# Patient Record
Sex: FEMALE | Race: BLACK OR AFRICAN AMERICAN | URBAN - METROPOLITAN AREA
[De-identification: names, ages, dates, MRNs, and addresses within clinical notes are randomized per-mention and may not be internally consistent; named-entity substitution may affect disease eponyms.]

---

## 2017-01-08 RX ORDER — WARFARIN 6 MG/1
TABLET ORAL
Qty: 90 TAB | Refills: 3 | Status: SHIPPED | OUTPATIENT
Start: 2017-01-08 | End: 2017-12-03 | Stop reason: SDUPTHER

## 2017-01-16 ENCOUNTER — OFFICE VISIT (OUTPATIENT)
Dept: FAMILY MEDICINE CLINIC | Age: 82
End: 2017-01-16

## 2017-01-16 ENCOUNTER — HOSPITAL ENCOUNTER (OUTPATIENT)
Dept: LAB | Age: 82
Discharge: HOME | End: 2017-01-16

## 2017-01-16 VITALS
DIASTOLIC BLOOD PRESSURE: 66 MMHG | SYSTOLIC BLOOD PRESSURE: 164 MMHG | BODY MASS INDEX: 22.67 KG/M2 | RESPIRATION RATE: 16 BRPM | WEIGHT: 108 LBS | TEMPERATURE: 97.5 F | HEART RATE: 64 BPM | OXYGEN SATURATION: 99 % | HEIGHT: 58 IN

## 2017-01-16 DIAGNOSIS — Z95.2 H/O PROSTHETIC HEART VALVE: Primary | ICD-10-CM

## 2017-01-16 DIAGNOSIS — R63.4 WEIGHT LOSS, UNINTENTIONAL: ICD-10-CM

## 2017-01-16 DIAGNOSIS — I50.42 CHRONIC COMBINED SYSTOLIC AND DIASTOLIC CONGESTIVE HEART FAILURE (HCC): ICD-10-CM

## 2017-01-16 LAB
INR BLD: 2.7
PT POC: NORMAL SEC
VALID INTERNAL CONTROL?: YES

## 2017-01-16 PROCEDURE — 99001 SPECIMEN HANDLING PT-LAB: CPT | Performed by: INTERNAL MEDICINE

## 2017-01-16 RX ORDER — OXYCODONE AND ACETAMINOPHEN 5; 325 MG/1; MG/1
1 TABLET ORAL
Qty: 10 TAB | Refills: 0 | Status: SHIPPED | OUTPATIENT
Start: 2017-01-16 | End: 2017-12-20 | Stop reason: ALTCHOICE

## 2017-01-16 RX ORDER — POTASSIUM CHLORIDE 20 MEQ/1
20 TABLET, EXTENDED RELEASE ORAL DAILY
Qty: 90 TAB | Refills: 1
Start: 2017-01-16 | End: 2018-02-26 | Stop reason: ALTCHOICE

## 2017-01-16 NOTE — PROGRESS NOTES
Chief Complaint   Patient presents with    Coagulation disorder     Pain scale 0/10      1. Have you been to the ER, urgent care clinic since your last visit? Hospitalized since your last visit? No    2. Have you seen or consulted any other health care providers outside of the [address] [place] [address] since your last visit? Include any pap smears or colon screening.  no

## 2017-01-16 NOTE — PROGRESS NOTES
HISTORY OF PRESENT ILLNESS  Carina Leigh is a 80 y.o. female. HPI  chf stable  prosthetic valve stable  C/o wgt loss  Review of Systems   All other systems reviewed and are negative. Past Medical History   Diagnosis Date    Heart valve replaced     Hypercholesterolemia     Hypertension      Current Outpatient Prescriptions on File Prior to Visit   Medication Sig Dispense Refill    warfarin (COUMADIN) 6 mg tablet take 1 tablet by mouth once daily 90 Tab 3    NASONEX 50 mcg/actuation nasal spray instill 2 sprays into each nostril once daily 3 Container 3    oxyCODONE-acetaminophen (PERCOCET) 5-325 mg per tablet Take 1 Tab by mouth every four (4) hours as needed. 0    ferrous sulfate, dried (SLOW RELEASE IRON) 159 mg (45 mg iron) TbER tablet Take 1 Tab by mouth daily. 30 Tab 0    lisinopril (PRINIVIL, ZESTRIL) 10 mg tablet Take 1 Tab by mouth daily. 90 Tab 1    esomeprazole (NEXIUM) 40 mg capsule Take 1 Cap by mouth daily. 90 Cap 1    rOPINIRole (REQUIP) 0.5 mg tablet Take 1 Tab by mouth two (2) times a day. 180 Tab 3    fluticasone (FLONASE) 50 mcg/actuation nasal spray 2 Sprays by Both Nostrils route once.  alendronate (FOSAMAX) 70 mg tablet Take 1 weekly with a 2000 unit vitamin D tablet 12 Tab 3    warfarin (COUMADIN) 1 mg tablet Take one every Tuesday, Thursday, Saturday and Sunday. Skip dose every Monday, Wednesday and Friday 100 Tab 3    atorvastatin (LIPITOR) 20 mg tablet Take 1 Tab by mouth daily. 90 Tab 3    guaiFENesin SR (MUCINEX) 600 mg SR tablet Take 1 Tab by mouth two (2) times a day. 20 Tab 0    warfarin (COUMADIN) 6 mg tablet Take 1 Tab by mouth daily. 90 Tab 3    diclofenac (VOLTAREN) 1 % topical gel Apply 4 g to affected area four (4) times daily. 500 g 3    OTHER       OTHER       alendronate-vitamin d3 70-2,800 mg-unit per tablet Take 1 Tab by mouth every seven (7) days.  12 Tab 3    potassium chloride (KLOR-CON M20) 20 mEq tablet Take  by mouth two (2) times a day.      aspirin 81 mg tablet Take 81 mg by mouth.  multivitamins-minerals-lutein (CENTRUM SILVER) Tab Take  by mouth.  calcium carbonate (CALTREX) 600 mg (1,500 mg) tablet Take 600 mg by mouth two (2) times a day.  ESOMEPRAZOLE MAGNESIUM (NEXIUM PO) Take  by mouth.  bumetanide (BUMEX) 2 mg tablet Take 2 mg by mouth daily.  carvedilol (COREG) 6.25 mg tablet Take 1 Tab by mouth two (2) times daily (with meals). 180 Tab 1    carbinoxamine maleate (PALGIC) 4 mg tab Take 1 Tab by mouth three (3) times daily. 40 Tab 1    zolpidem (AMBIEN) 5 mg tablet Take 1 tablet by mouth nightly. 30 tablet 1    cyclobenzaprine (FLEXERIL) 5 mg tablet Take 1 tablet by mouth three (3) times daily as needed for Muscle Spasm(s). 30 tablet 1     No current facility-administered medications on file prior to visit. Visit Vitals    /66 (BP 1 Location: Right arm, BP Patient Position: Sitting)    Pulse 64    Temp 97.5 °F (36.4 °C) (Oral)    Resp 16    Ht 4' 10\" (1.473 m)    Wt 108 lb (49 kg)    SpO2 99%    BMI 22.57 kg/m2         Physical Exam   Constitutional: She appears well-developed and well-nourished. No distress. Musculoskeletal: Normal range of motion. She exhibits no edema, tenderness or deformity. Skin: She is not diaphoretic. Vitals reviewed.     inr 2.7  ASSESSMENT and PLAN  chf   Prosthetic valve  wgt loss  Plan  Continue rx  Labs  Recheck in 1 month

## 2017-01-16 NOTE — MR AVS SNAPSHOT
Visit Information Date & Time Provider Department Dept. Phone Encounter #  
 1/16/2017 10:45 AM Per Crow, 3 [facility] [phone] [id number] Follow-up Instructions Return in about 1 month (around 2/16/2017). Follow-up and Disposition History Upcoming Health Maintenance Date Due  
 GLAUCOMA SCREENING Q2Y 11/12/2016 MEDICARE YEARLY EXAM 8/20/2017 DTaP/Tdap/Td series (2 - Td) 9/19/2026 Allergies as of 1/16/2017  Review Complete On: 1/16/2017 By: Per Crow MD  
 No Known Allergies Current Immunizations  Reviewed on 8/12/2015 Name Date Influenza High Dose Vaccine PF 9/19/2016 12:12 PM  
 Influenza Vaccine 10/24/2014 11:37 AM, 11/12/2013 11:04 AM, 7/1/2008 Influenza Vaccine PF 9/5/2016 Pneumococcal Conjugate (PCV-13) 9/19/2016 12:13 PM  
 Pneumococcal Polysaccharide (PPSV-23) 9/5/2016, 11/26/2014 10:15 AM, 7/1/2008 Tdap 9/19/2016 12:12 PM, 8/12/2015 11:18 AM  
  
 Not reviewed this visit You Were Diagnosed With   
  
 Codes Comments H/O prosthetic heart valve    -  Primary ICD-10-CM: Z95.2 ICD-9-CM: V43.3 Chronic combined systolic and diastolic congestive heart failure (HCC)     ICD-10-CM: I50.42 
ICD-9-CM: 428.42, 428.0 Weight loss, unintentional     ICD-10-CM: R63.4 ICD-9-CM: 783.21 Vitals BP Pulse Temp Resp Height(growth percentile) Weight(growth percentile) 164/66 (BP 1 Location: Right arm, BP Patient Position: Sitting) 64 97.5 °F (36.4 °C) (Oral) 16 4' 10\" (1.473 m) 108 lb (49 kg) SpO2 BMI OB Status Smoking Status 99% 22.57 kg/m2 Postmenopausal Former Smoker BMI and BSA Data Body Mass Index Body Surface Area  
 22.57 kg/m 2 1.42 m 2 Preferred Pharmacy Pharmacy Name Phone [id number] [address] [address] [phone] Your Updated Medication List  
  
   
 This list is accurate as of: 1/16/17 11:25 AM.  Always use your most recent med list.  
  
  
  
  
 alendronate 70 mg tablet Commonly known as:  FOSAMAX Take 1 weekly with a 2000 unit vitamin D tablet  
  
 aspirin 81 mg tablet Take 81 mg by mouth. atorvastatin 20 mg tablet Commonly known as:  LIPITOR Take 1 Tab by mouth daily. bumetanide 2 mg tablet Commonly known as:  Dimas Tony Take 2 mg by mouth daily. calcium carbonate 600 mg (1,500 mg) tablet Commonly known as:  Eleanora Salk Take 600 mg by mouth two (2) times a day. carvedilol 6.25 mg tablet Commonly known as:  Pepito Marshal Take 1 Tab by mouth two (2) times daily (with meals). CENTRUM SILVER Tab tablet Generic drug:  multivitamins-minerals-lutein Take  by mouth.  
  
 esomeprazole 40 mg capsule Commonly known as:  NexIUM Take 1 Cap by mouth daily. ferrous sulfate, dried 159 mg (45 mg iron) Tber tablet Commonly known as:  SLOW RELEASE IRON Take 1 Tab by mouth daily. guaiFENesin  mg SR tablet Commonly known as:  Gioavnni & Giovanni Take 1 Tab by mouth two (2) times a day. lisinopril 10 mg tablet Commonly known as:  Estela Reasons Take 1 Tab by mouth daily. NASONEX 50 mcg/actuation nasal spray Generic drug:  mometasone  
instill 2 sprays into each nostril once daily  
  
 oxyCODONE-acetaminophen 5-325 mg per tablet Commonly known as:  PERCOCET Take 1 Tab by mouth every eight (8) hours as needed. Max Daily Amount: 3 Tabs. potassium chloride 20 mEq tablet Commonly known as:  K-DUR, KLOR-CON Take 1 Tab by mouth daily. rOPINIRole 0.5 mg tablet Commonly known as:  Mina Ortega Take 1 Tab by mouth two (2) times a day. * warfarin 1 mg tablet Commonly known as:  COUMADIN Take one every Tuesday, Thursday, Saturday and Sunday. Skip dose every Monday, Wednesday and Friday * warfarin 6 mg tablet Commonly known as:  COUMADIN  
take 1 tablet by mouth once daily * Notice: This list has 2 medication(s) that are the same as other medications prescribed for you. Read the directions carefully, and ask your doctor or other care provider to review them with you. Prescriptions Printed Refills  
 oxyCODONE-acetaminophen (PERCOCET) 5-325 mg per tablet 0 Sig: Take 1 Tab by mouth every eight (8) hours as needed. Max Daily Amount: 3 Tabs. Class: Print Route: Oral  
  
We Performed the Following AMB POC PT/INR [44488 CPT(R)] Follow-up Instructions Return in about 1 month (around 2/16/2017). To-Do List   
 01/16/2017 Lab:  CBC WITH AUTOMATED DIFF   
  
 01/16/2017 Lab:  METABOLIC PANEL, COMPREHENSIVE   
  
 01/16/2017 Lab:  T3 TOTAL   
  
 01/16/2017 Lab:  T4, FREE   
  
 01/16/2017 Lab:  TSH 3RD GENERATION   
  
 01/16/2017 Imaging:  XR HIP RT W OR WO PELV 2-3 VWS Introducing [facility] & HEALTH SERVICES! [employer] introduces [employer] patient portal. Now you can access parts of your medical record, email your doctor's office, and request medication refills online. 1. In your internet browser, go to [email or web address] 2. Click on the First Time User? Click Here link in the Sign In box. You will see the New Member Sign Up page. 3. Enter your [employer] Access Code exactly as it appears below. You will not need to use this code after youve completed the sign-up process. If you do not sign up before the expiration date, you must request a new code. · [employer] Access Code: [email or web address] Expires: 1/17/2017 10:34 AM 
 
4. Enter the last four digits of your Social Security Number (xxxx) and Date of Birth (mm/dd/yyyy) as indicated and click Submit. You will be taken to the next sign-up page. 5. Create a MicroJob ID. This will be your MicroJob login ID and cannot be changed, so think of one that is secure and easy to remember. 6. Create a Apsmart password. You can change your password at any time. 7. Enter your Password Reset Question and Answer. This can be used at a later time if you forget your password. 8. Enter your e-mail address. You will receive e-mail notification when new information is available in [address] 9. Click Sign Up. You can now view and download portions of your medical record. 10. Click the Download Summary menu link to download a portable copy of your medical information. If you have questions, please visit the Frequently Asked Questions section of the [employer] website. Remember, [employer]mart is NOT to be used for urgent needs. For medical emergencies, dial 911. Now available from your iPhone and Android! Please provide this summary of care documentation to your next provider. Your primary care clinician is listed as Octavia Singh. If you have any questions after today's visit, please call [phone].

## 2017-01-17 LAB
ALBUMIN SERPL-MCNC: 3.9 G/DL (ref 3.5–4.7)
ALBUMIN/GLOB SERPL: 1.3 {RATIO} (ref 1.1–2.5)
ALP SERPL-CCNC: 57 IU/L (ref 39–117)
ALT SERPL-CCNC: 19 IU/L (ref 0–32)
AST SERPL-CCNC: 29 IU/L (ref 0–40)
BASOPHILS # BLD AUTO: 0 X10E3/UL (ref 0–0.2)
BASOPHILS NFR BLD AUTO: 0 %
BILIRUB SERPL-MCNC: 0.3 MG/DL (ref 0–1.2)
BUN SERPL-MCNC: 31 MG/DL (ref 8–27)
BUN/CREAT SERPL: 29 (ref 11–26)
CALCIUM SERPL-MCNC: 9.5 MG/DL (ref 8.7–10.3)
CHLORIDE SERPL-SCNC: 101 MMOL/L (ref 96–106)
CO2 SERPL-SCNC: 24 MMOL/L (ref 18–29)
CREAT SERPL-MCNC: 1.06 MG/DL (ref 0.57–1)
EOSINOPHIL # BLD AUTO: 0.2 X10E3/UL (ref 0–0.4)
EOSINOPHIL NFR BLD AUTO: 4 %
ERYTHROCYTE [DISTWIDTH] IN BLOOD BY AUTOMATED COUNT: 14.6 % (ref 12.3–15.4)
GLOBULIN SER CALC-MCNC: 2.9 G/DL (ref 1.5–4.5)
GLUCOSE SERPL-MCNC: 143 MG/DL (ref 65–99)
HCT VFR BLD AUTO: 34.2 % (ref 34–46.6)
HGB BLD-MCNC: 11.4 G/DL (ref 11.1–15.9)
IMM GRANULOCYTES # BLD: 0 X10E3/UL (ref 0–0.1)
IMM GRANULOCYTES NFR BLD: 0 %
INTERPRETATION: NORMAL
LYMPHOCYTES # BLD AUTO: 0.7 X10E3/UL (ref 0.7–3.1)
LYMPHOCYTES NFR BLD AUTO: 14 %
MCH RBC QN AUTO: 31.9 PG (ref 26.6–33)
MCHC RBC AUTO-ENTMCNC: 33.3 G/DL (ref 31.5–35.7)
MCV RBC AUTO: 96 FL (ref 79–97)
MONOCYTES # BLD AUTO: 0.5 X10E3/UL (ref 0.1–0.9)
MONOCYTES NFR BLD AUTO: 10 %
NEUTROPHILS # BLD AUTO: 3.7 X10E3/UL (ref 1.4–7)
NEUTROPHILS NFR BLD AUTO: 72 %
PLATELET # BLD AUTO: 184 X10E3/UL (ref 150–379)
POTASSIUM SERPL-SCNC: 4.1 MMOL/L (ref 3.5–5.2)
PROT SERPL-MCNC: 6.8 G/DL (ref 6–8.5)
RBC # BLD AUTO: 3.57 X10E6/UL (ref 3.77–5.28)
SODIUM SERPL-SCNC: 140 MMOL/L (ref 134–144)
T3 SERPL-MCNC: 98 NG/DL (ref 71–180)
T4 FREE SERPL-MCNC: 1.1 NG/DL (ref 0.82–1.77)
TSH SERPL DL<=0.005 MIU/L-ACNC: 1.47 UIU/ML (ref 0.45–4.5)
WBC # BLD AUTO: 5.1 X10E3/UL (ref 3.4–10.8)

## 2017-01-20 ENCOUNTER — TELEPHONE (OUTPATIENT)
Dept: FAMILY MEDICINE CLINIC | Age: 82
End: 2017-01-20

## 2017-01-20 NOTE — TELEPHONE ENCOUNTER
----- Message from Rebeca Lam MD sent at 1/16/2017  5:07 PM EST -----  Call, severe arthritis in hip  Needs to see ortho

## 2017-02-15 ENCOUNTER — OFFICE VISIT (OUTPATIENT)
Dept: FAMILY MEDICINE CLINIC | Age: 82
End: 2017-02-15

## 2017-02-15 VITALS
WEIGHT: 108.4 LBS | BODY MASS INDEX: 22.75 KG/M2 | HEIGHT: 58 IN | SYSTOLIC BLOOD PRESSURE: 132 MMHG | RESPIRATION RATE: 18 BRPM | HEART RATE: 66 BPM | OXYGEN SATURATION: 96 % | DIASTOLIC BLOOD PRESSURE: 76 MMHG | TEMPERATURE: 97.4 F

## 2017-02-15 DIAGNOSIS — M16.9 OSTEOARTHROSIS, HIP: ICD-10-CM

## 2017-02-15 DIAGNOSIS — Z95.2 H/O PROSTHETIC HEART VALVE: Primary | ICD-10-CM

## 2017-02-15 LAB
INR BLD: 3.3
PT POC: NORMAL SEC
VALID INTERNAL CONTROL?: YES

## 2017-02-15 NOTE — PROGRESS NOTES
HISTORY OF PRESENT ILLNESS  Jessie Bradshaw is a 80 y.o. female. HPI  Prosthetic valve stable  Hip pain  wgt loss  Review of Systems   Musculoskeletal: Positive for joint pain. All other systems reviewed and are negative. Past Medical History   Diagnosis Date    Heart valve replaced     Hypercholesterolemia     Hypertension      Current Outpatient Prescriptions on File Prior to Visit   Medication Sig Dispense Refill    oxyCODONE-acetaminophen (PERCOCET) 5-325 mg per tablet Take 1 Tab by mouth every eight (8) hours as needed. Max Daily Amount: 3 Tabs. 10 Tab 0    potassium chloride (K-DUR, KLOR-CON) 20 mEq tablet Take 1 Tab by mouth daily. 90 Tab 1    warfarin (COUMADIN) 6 mg tablet take 1 tablet by mouth once daily 90 Tab 3    NASONEX 50 mcg/actuation nasal spray instill 2 sprays into each nostril once daily 3 Container 3    ferrous sulfate, dried (SLOW RELEASE IRON) 159 mg (45 mg iron) TbER tablet Take 1 Tab by mouth daily. 30 Tab 0    carvedilol (COREG) 6.25 mg tablet Take 1 Tab by mouth two (2) times daily (with meals). 180 Tab 1    lisinopril (PRINIVIL, ZESTRIL) 10 mg tablet Take 1 Tab by mouth daily. 90 Tab 1    esomeprazole (NEXIUM) 40 mg capsule Take 1 Cap by mouth daily. 90 Cap 1    rOPINIRole (REQUIP) 0.5 mg tablet Take 1 Tab by mouth two (2) times a day. 180 Tab 3    alendronate (FOSAMAX) 70 mg tablet Take 1 weekly with a 2000 unit vitamin D tablet 12 Tab 3    warfarin (COUMADIN) 1 mg tablet Take one every Tuesday, Thursday, Saturday and Sunday. Skip dose every Monday, Wednesday and Friday 100 Tab 3    atorvastatin (LIPITOR) 20 mg tablet Take 1 Tab by mouth daily. 90 Tab 3    guaiFENesin SR (MUCINEX) 600 mg SR tablet Take 1 Tab by mouth two (2) times a day. 20 Tab 0    aspirin 81 mg tablet Take 81 mg by mouth.  multivitamins-minerals-lutein (CENTRUM SILVER) Tab Take  by mouth.         calcium carbonate (CALTREX) 600 mg (1,500 mg) tablet Take 600 mg by mouth two (2) times a day.        bumetanide (BUMEX) 2 mg tablet Take 2 mg by mouth daily. No current facility-administered medications on file prior to visit. Visit Vitals    /76    Pulse 66    Temp 97.4 °F (36.3 °C)    Resp 18    Ht 4' 10\" (1.473 m)    Wt 108 lb 6.4 oz (49.2 kg)    SpO2 96%    BMI 22.66 kg/m2         Physical Exam   Constitutional: She appears well-developed and well-nourished. No distress. Cardiovascular: Normal rate, regular rhythm and intact distal pulses. Exam reveals no gallop and no friction rub. Murmur heard. Musculoskeletal: Normal range of motion. She exhibits tenderness. She exhibits no edema or deformity. Skin: She is not diaphoretic. Vitals reviewed.     xr ++ djd  inr 3.3  Labs OK  Imp  Heart vlave  Advanced djd hip  Plan  Consider ortho  nsaids prn  Recheck in 1 month

## 2017-02-15 NOTE — PROGRESS NOTES
Robles Booker is a 80 y.o. female today to follow-up for INR check and HTN. 1. Have you been to the ER, urgent care clinic since your last visit? Hospitalized since your last visit? No    2. Have you seen or consulted any other health care providers outside of the [address] [place] [address] since your last visit? Include any pap smears or colon screening.  Yes Where: Cardiologist

## 2017-02-15 NOTE — MR AVS SNAPSHOT
Visit Information Date & Time Provider Department Dept. Phone Encounter #  
 2/15/2017 10:15 AM Tarah Coughlin MD [employer] [phone] [id number] Follow-up Instructions Return in about 1 month (around 3/15/2017). Follow-up and Disposition History Upcoming Health Maintenance Date Due  
 GLAUCOMA SCREENING Q2Y 11/12/2016 MEDICARE YEARLY EXAM 8/20/2017 DTaP/Tdap/Td series (2 - Td) 9/19/2026 Allergies as of 2/15/2017  Review Complete On: 2/15/2017 By: Tarah Coughlin MD  
 No Known Allergies Current Immunizations  Reviewed on 8/12/2015 Name Date Influenza High Dose Vaccine PF 9/19/2016 12:12 PM  
 Influenza Vaccine 10/24/2014 11:37 AM, 11/12/2013 11:04 AM, 7/1/2008 Influenza Vaccine PF 9/5/2016 Pneumococcal Conjugate (PCV-13) 9/19/2016 12:13 PM  
 Pneumococcal Polysaccharide (PPSV-23) 9/5/2016, 11/26/2014 10:15 AM, 7/1/2008 Tdap 9/19/2016 12:12 PM, 8/12/2015 11:18 AM  
  
 Not reviewed this visit You Were Diagnosed With   
  
 Codes Comments H/O prosthetic heart valve    -  Primary ICD-10-CM: Z95.2 ICD-9-CM: V43.3 Osteoarthrosis, hip     ICD-10-CM: M16.9 ICD-9-CM: 715.35 Vitals BP Pulse Temp Resp Height(growth percentile) Weight(growth percentile) 132/76 66 97.4 °F (36.3 °C) 18 4' 10\" (1.473 m) 108 lb 6.4 oz (49.2 kg) SpO2 BMI OB Status Smoking Status 96% 22.66 kg/m2 Postmenopausal Former Smoker Vitals History BMI and BSA Data Body Mass Index Body Surface Area  
 22.66 kg/m 2 1.42 m 2 Preferred Pharmacy Pharmacy Name Phone [address] [address] [address] [phone] Your Updated Medication List  
  
   
This list is accurate as of: 2/15/17 10:46 AM.  Always use your most recent med list.  
  
  
  
  
 alendronate 70 mg tablet Commonly known as:  FOSAMAX Take 1 weekly with a 2000 unit vitamin D tablet aspirin 81 mg tablet Take 81 mg by mouth. atorvastatin 20 mg tablet Commonly known as:  LIPITOR Take 1 Tab by mouth daily. bumetanide 2 mg tablet Commonly known as:  Malachy Bile Take 2 mg by mouth daily. calcium carbonate 600 mg calcium (1,500 mg) tablet Commonly known as:  Krish Dikes Take 600 mg by mouth two (2) times a day. carvedilol 6.25 mg tablet Commonly known as:  Macel Karri Take 1 Tab by mouth two (2) times daily (with meals). CENTRUM SILVER Tab tablet Generic drug:  multivitamins-minerals-lutein Take  by mouth.  
  
 esomeprazole 40 mg capsule Commonly known as:  NexIUM Take 1 Cap by mouth daily. ferrous sulfate, dried 159 mg (45 mg iron) Tber tablet Commonly known as:  SLOW RELEASE IRON Take 1 Tab by mouth daily. guaiFENesin  mg SR tablet Commonly known as:  Giovanni & Giovanni Take 1 Tab by mouth two (2) times a day. lisinopril 10 mg tablet Commonly known as:  Monica Needy Take 1 Tab by mouth daily. NASONEX 50 mcg/actuation nasal spray Generic drug:  mometasone  
instill 2 sprays into each nostril once daily  
  
 oxyCODONE-acetaminophen 5-325 mg per tablet Commonly known as:  PERCOCET Take 1 Tab by mouth every eight (8) hours as needed. Max Daily Amount: 3 Tabs. potassium chloride 20 mEq tablet Commonly known as:  K-DUR, KLOR-CON Take 1 Tab by mouth daily. rOPINIRole 0.5 mg tablet Commonly known as:  Mima Bleak Take 1 Tab by mouth two (2) times a day. * warfarin 1 mg tablet Commonly known as:  COUMADIN Take one every Tuesday, Thursday, Saturday and Sunday. Skip dose every Monday, Wednesday and Friday * warfarin 6 mg tablet Commonly known as:  COUMADIN  
take 1 tablet by mouth once daily * Notice: This list has 2 medication(s) that are the same as other medications prescribed for you.  Read the directions carefully, and ask your doctor or other care provider to review them with you. Follow-up Instructions Return in about 1 month (around 3/15/2017). Introducing [facility] & HEALTH SERVICES! [employer] introduces [employer] patient portal. Now you can access parts of your medical record, email your doctor's office, and request medication refills online. 1. In your internet browser, go to [email or web address] 2. Click on the First Time User? Click Here link in the Sign In box. You will see the New Member Sign Up page. 3. Enter your [employer] Access Code exactly as it appears below. You will not need to use this code after youve completed the sign-up process. If you do not sign up before the expiration date, you must request a new code. · [employer] Access Code: [email or web address] Expires: 5/16/2017 10:46 AM 
 
4. Enter the last four digits of your Social Security Number (xxxx) and Date of Birth (mm/dd/yyyy) as indicated and click Submit. You will be taken to the next sign-up page. 5. Create a [employer] ID. This will be your [employer] login ID and cannot be changed, so think of one that is secure and easy to remember. 6. Create a [employer] password. You can change your password at any time. 7. Enter your Password Reset Question and Answer. This can be used at a later time if you forget your password. 8. Enter your e-mail address. You will receive e-mail notification when new information is available in [address] 9. Click Sign Up. You can now view and download portions of your medical record. 10. Click the Download Summary menu link to download a portable copy of your medical information. If you have questions, please visit the Frequently Asked Questions section of the [employer] website. Remember, [employer] is NOT to be used for urgent needs. For medical emergencies, dial 911. Now available from your iPhone and Android! Please provide this summary of care documentation to your next provider. Your primary care clinician is listed as Artist Anne. If you have any questions after today's visit, please call [phone].

## 2017-03-15 ENCOUNTER — OFFICE VISIT (OUTPATIENT)
Dept: FAMILY MEDICINE CLINIC | Age: 82
End: 2017-03-15

## 2017-03-15 VITALS
DIASTOLIC BLOOD PRESSURE: 72 MMHG | TEMPERATURE: 97.4 F | HEART RATE: 58 BPM | HEIGHT: 58 IN | BODY MASS INDEX: 22.84 KG/M2 | RESPIRATION RATE: 20 BRPM | OXYGEN SATURATION: 98 % | WEIGHT: 108.8 LBS | SYSTOLIC BLOOD PRESSURE: 162 MMHG

## 2017-03-15 DIAGNOSIS — I48.20 CHRONIC ATRIAL FIBRILLATION (HCC): Primary | ICD-10-CM

## 2017-03-15 LAB
HGB BLD-MCNC: 11.6 G/DL
INR BLD: 3.8
PT POC: 45.6 SEC
VALID INTERNAL CONTROL?: YES

## 2017-03-15 NOTE — PROGRESS NOTES
HISTORY OF PRESENT ILLNESS  Jessie Bradshaw is a 80 y.o. female. HPI  A fib stable   hbp stable  Review of Systems   All other systems reviewed and are negative. Past Medical History:   Diagnosis Date    Heart valve replaced     Hypercholesterolemia     Hypertension        Current Outpatient Prescriptions:     potassium chloride (K-DUR, KLOR-CON) 20 mEq tablet, Take 1 Tab by mouth daily. , Disp: 90 Tab, Rfl: 1    warfarin (COUMADIN) 6 mg tablet, take 1 tablet by mouth once daily, Disp: 90 Tab, Rfl: 3    NASONEX 50 mcg/actuation nasal spray, instill 2 sprays into each nostril once daily, Disp: 3 Container, Rfl: 3    ferrous sulfate, dried (SLOW RELEASE IRON) 159 mg (45 mg iron) TbER tablet, Take 1 Tab by mouth daily. , Disp: 30 Tab, Rfl: 0    carvedilol (COREG) 6.25 mg tablet, Take 1 Tab by mouth two (2) times daily (with meals). , Disp: 180 Tab, Rfl: 1    lisinopril (PRINIVIL, ZESTRIL) 10 mg tablet, Take 1 Tab by mouth daily. , Disp: 90 Tab, Rfl: 1    esomeprazole (NEXIUM) 40 mg capsule, Take 1 Cap by mouth daily. , Disp: 90 Cap, Rfl: 1    rOPINIRole (REQUIP) 0.5 mg tablet, Take 1 Tab by mouth two (2) times a day., Disp: 180 Tab, Rfl: 3    alendronate (FOSAMAX) 70 mg tablet, Take 1 weekly with a 2000 unit vitamin D tablet, Disp: 12 Tab, Rfl: 3    warfarin (COUMADIN) 1 mg tablet, Take one every Tuesday, Thursday, Saturday and Sunday. Skip dose every Monday, Wednesday and Friday, Disp: 100 Tab, Rfl: 3    atorvastatin (LIPITOR) 20 mg tablet, Take 1 Tab by mouth daily. , Disp: 90 Tab, Rfl: 3    aspirin 81 mg tablet, Take 81 mg by mouth.  , Disp: , Rfl:     multivitamins-minerals-lutein (CENTRUM SILVER) Tab, Take  by mouth.  , Disp: , Rfl:     calcium carbonate (CALTREX) 600 mg (1,500 mg) tablet, Take 600 mg by mouth two (2) times a day.  , Disp: , Rfl:     bumetanide (BUMEX) 2 mg tablet, Take 2 mg by mouth daily.   , Disp: , Rfl:     oxyCODONE-acetaminophen (PERCOCET) 5-325 mg per tablet, Take 1 Tab by mouth every eight (8) hours as needed. Max Daily Amount: 3 Tabs., Disp: 10 Tab, Rfl: 0    guaiFENesin SR (MUCINEX) 600 mg SR tablet, Take 1 Tab by mouth two (2) times a day., Disp: 20 Tab, Rfl: 0  Visit Vitals    /74    Pulse (!) 58    Temp 97.4 °F (36.3 °C)    Resp 20    Ht 4' 10\" (1.473 m)    Wt 108 lb 12.8 oz (49.4 kg)    SpO2 98%    BMI 22.74 kg/m2   BP recheck 144/60      Physical Exam   Constitutional: She appears well-developed and well-nourished. No distress. Cardiovascular: Normal rate, regular rhythm and intact distal pulses. Exam reveals no gallop and no friction rub. Murmur heard. + click   Skin: She is not diaphoretic. Vitals reviewed.     inr 3.8  hgb 11.8  ASSESSMENT and PLAN  a fib   hbp  Plan  Reduce coumadin to 6 mg daily  Stop oral iron  Recheck in 1 month

## 2017-03-15 NOTE — MR AVS SNAPSHOT
Visit Information Date & Time Provider Department Dept. Phone Encounter #  
 3/15/2017 11:15 AM Ann MarieDa [facility] [phone] [id number] Follow-up Instructions Return in about 1 month (around 4/15/2017). Upcoming Health Maintenance Date Due  
 GLAUCOMA SCREENING Q2Y 11/12/2016 MEDICARE YEARLY EXAM 8/20/2017 DTaP/Tdap/Td series (2 - Td) 9/19/2026 Allergies as of 3/15/2017  Review Complete On: 3/15/2017 By: Ann Marie MD  
 No Known Allergies Current Immunizations  Reviewed on 8/12/2015 Name Date Influenza High Dose Vaccine PF 9/19/2016 12:12 PM  
 Influenza Vaccine 10/24/2014 11:37 AM, 11/12/2013 11:04 AM, 7/1/2008 Influenza Vaccine PF 9/5/2016 Pneumococcal Conjugate (PCV-13) 9/19/2016 12:13 PM  
 Pneumococcal Polysaccharide (PPSV-23) 9/5/2016, 11/26/2014 10:15 AM, 7/1/2008 Tdap 9/19/2016 12:12 PM, 8/12/2015 11:18 AM  
  
 Not reviewed this visit You Were Diagnosed With   
  
 Codes Comments Chronic atrial fibrillation (HCC)    -  Primary ICD-10-CM: L86.2 ICD-9-CM: 427.31 Vitals BP Pulse Temp Resp Height(growth percentile) Weight(growth percentile) 162/72 (!) 58 97.4 °F (36.3 °C) 20 4' 10\" (1.473 m) 108 lb 12.8 oz (49.4 kg) SpO2 BMI OB Status Smoking Status 98% 22.74 kg/m2 Postmenopausal Former Smoker Vitals History BMI and BSA Data Body Mass Index Body Surface Area 22.74 kg/m 2 1.42 m 2 Preferred Pharmacy Pharmacy Name Phone [id number] [facility] [address] [phone] Your Updated Medication List  
  
   
This list is accurate as of: 3/15/17 11:39 AM.  Always use your most recent med list.  
  
  
  
  
 alendronate 70 mg tablet Commonly known as:  FOSAMAX Take 1 weekly with a 2000 unit vitamin D tablet  
  
 aspirin 81 mg tablet Take 81 mg by mouth. atorvastatin 20 mg tablet Commonly known as:  LIPITOR Take 1 Tab by mouth daily. bumetanide 2 mg tablet Commonly known as:  Welby Shows Take 2 mg by mouth daily. calcium carbonate 600 mg calcium (1,500 mg) tablet Commonly known as:  Lyle Malling Take 600 mg by mouth two (2) times a day. carvedilol 6.25 mg tablet Commonly known as:  Laure Belkys Take 1 Tab by mouth two (2) times daily (with meals). CENTRUM SILVER Tab tablet Generic drug:  multivitamins-minerals-lutein Take  by mouth.  
  
 esomeprazole 40 mg capsule Commonly known as:  NexIUM Take 1 Cap by mouth daily. ferrous sulfate, dried 159 mg (45 mg iron) Tber tablet Commonly known as:  SLOW RELEASE IRON Take 1 Tab by mouth daily. guaiFENesin  mg SR tablet Commonly known as:  Giovanni & Giovanni Take 1 Tab by mouth two (2) times a day. lisinopril 10 mg tablet Commonly known as:  Dionicio Economy Take 1 Tab by mouth daily. NASONEX 50 mcg/actuation nasal spray Generic drug:  mometasone  
instill 2 sprays into each nostril once daily  
  
 oxyCODONE-acetaminophen 5-325 mg per tablet Commonly known as:  PERCOCET Take 1 Tab by mouth every eight (8) hours as needed. Max Daily Amount: 3 Tabs. potassium chloride 20 mEq tablet Commonly known as:  K-DUR KLOR-CON Take 1 Tab by mouth daily. rOPINIRole 0.5 mg tablet Commonly known as:  Franklin Yemeni Take 1 Tab by mouth two (2) times a day. * warfarin 1 mg tablet Commonly known as:  COUMADIN Take one every Tuesday, Thursday, Saturday and Sunday. Skip dose every Monday, Wednesday and Friday * warfarin 6 mg tablet Commonly known as:  COUMADIN  
take 1 tablet by mouth once daily * Notice: This list has 2 medication(s) that are the same as other medications prescribed for you. Read the directions carefully, and ask your doctor or other care provider to review them with you. We Performed the Following AMB POC HEMOGLOBIN (HGB) [20895 CPT(R)] AMB POC PT/INR [61521 CPT(R)] Follow-up Instructions Return in about 1 month (around 4/15/2017). Introducing [facility] & HEALTH SERVICES! [facility] introduces Joy[employer] patient portal. Now you can access parts of your medical record, email your doctor's office, and request medication refills online. 1. In your internet browser, go to [email or web address] 2. Click on the First Time User? Click Here link in the Sign In box. You will see the New Member Sign Up page. 3. Enter your Joystickers Access Code exactly as it appears below. You will not need to use this code after youve completed the sign-up process. If you do not sign up before the expiration date, you must request a new code. · Joystickers Access Code: [email or web address] Expires: 5/16/2017 11:46 AM 
 
4. Enter the last four digits of your Social Security Number (xxxx) and Date of Birth (mm/dd/yyyy) as indicated and click Submit. You will be taken to the next sign-up page. 5. Create a Joystickers ID. This will be your Joystickers login ID and cannot be changed, so think of one that is secure and easy to remember. 6. Create a Joystickers password. You can change your password at any time. 7. Enter your Password Reset Question and Answer. This can be used at a later time if you forget your password. 8. Enter your e-mail address. You will receive e-mail notification when new information is available in [address] 9. Click Sign Up. You can now view and download portions of your medical record. 10. Click the Download Summary menu link to download a portable copy of your medical information. If you have questions, please visit the Frequently Asked Questions section of the Joystickers website. Remember, Joystickers is NOT to be used for urgent needs. For medical emergencies, dial 911. Now available from your iPhone and Android! Please provide this summary of care documentation to your next provider. Your primary care clinician is listed as Lela Lama. If you have any questions after today's visit, please call [phone].

## 2017-03-15 NOTE — PROGRESS NOTES
Delmar Christensen is a 80 y.o. female here today to follow-up for HTN        1. Have you been to the ER, urgent care clinic since your last visit? Hospitalized since your last visit? No    2. Have you seen or consulted any other health care providers outside of the [address] [place] [address] since your last visit? Include any pap smears or colon screening.  No

## 2017-03-16 RX ORDER — ESOMEPRAZOLE MAGNESIUM 40 MG/1
CAPSULE, DELAYED RELEASE ORAL
Qty: 90 CAP | Refills: 1 | Status: SHIPPED | OUTPATIENT
Start: 2017-03-16 | End: 2018-09-25 | Stop reason: SDUPTHER

## 2017-03-21 RX ORDER — ATORVASTATIN CALCIUM 20 MG/1
TABLET, FILM COATED ORAL
Qty: 90 TAB | Refills: 3 | Status: SHIPPED | OUTPATIENT
Start: 2017-03-21 | End: 2017-04-14 | Stop reason: SDUPTHER

## 2017-04-14 ENCOUNTER — OFFICE VISIT (OUTPATIENT)
Dept: FAMILY MEDICINE CLINIC | Age: 82
End: 2017-04-14

## 2017-04-14 VITALS
DIASTOLIC BLOOD PRESSURE: 66 MMHG | OXYGEN SATURATION: 100 % | WEIGHT: 106 LBS | BODY MASS INDEX: 22.25 KG/M2 | TEMPERATURE: 97.1 F | SYSTOLIC BLOOD PRESSURE: 150 MMHG | HEART RATE: 67 BPM | RESPIRATION RATE: 16 BRPM | HEIGHT: 58 IN

## 2017-04-14 DIAGNOSIS — I50.9 CHRONIC CONGESTIVE HEART FAILURE, UNSPECIFIED CONGESTIVE HEART FAILURE TYPE: Primary | ICD-10-CM

## 2017-04-14 DIAGNOSIS — I10 ESSENTIAL HYPERTENSION: ICD-10-CM

## 2017-04-14 LAB
INR BLD: 1.9
PT POC: NORMAL SEC
VALID INTERNAL CONTROL?: YES

## 2017-04-14 RX ORDER — LISINOPRIL 10 MG/1
10 TABLET ORAL DAILY
Qty: 90 TAB | Refills: 1 | Status: SHIPPED | OUTPATIENT
Start: 2017-04-14 | End: 2018-05-01 | Stop reason: DRUGHIGH

## 2017-04-14 RX ORDER — CARVEDILOL 6.25 MG/1
6.25 TABLET ORAL 2 TIMES DAILY WITH MEALS
Qty: 180 TAB | Refills: 1 | Status: SHIPPED | OUTPATIENT
Start: 2017-04-14 | End: 2017-08-16 | Stop reason: DRUGHIGH

## 2017-04-14 RX ORDER — DICLOFENAC SODIUM 10 MG/G
4 GEL TOPICAL 4 TIMES DAILY
Qty: 500 G | Refills: 3 | Status: SHIPPED | OUTPATIENT
Start: 2017-04-14 | End: 2018-10-25

## 2017-04-14 RX ORDER — ATORVASTATIN CALCIUM 20 MG/1
20 TABLET, FILM COATED ORAL DAILY
Qty: 90 TAB | Refills: 3 | Status: SHIPPED | OUTPATIENT
Start: 2017-04-14 | End: 2018-06-11 | Stop reason: SDUPTHER

## 2017-04-14 NOTE — MR AVS SNAPSHOT
Visit Information Date & Time Provider Department Dept. Phone Encounter #  
 4/14/2017 10:00 AM Arlet Garcia, [employer] [phone] [id number] Follow-up Instructions Return in about 1 month (around 5/14/2017). Follow-up and Disposition History Upcoming Health Maintenance Date Due  
 GLAUCOMA SCREENING Q2Y 11/12/2016 MEDICARE YEARLY EXAM 8/20/2017 DTaP/Tdap/Td series (2 - Td) 9/19/2026 Allergies as of 4/14/2017  Review Complete On: 4/14/2017 By: Arlet Garcia MD  
 No Known Allergies Current Immunizations  Reviewed on 8/12/2015 Name Date Influenza High Dose Vaccine PF 9/19/2016 12:12 PM  
 Influenza Vaccine 10/24/2014 11:37 AM, 11/12/2013 11:04 AM, 7/1/2008 Influenza Vaccine PF 9/5/2016 Pneumococcal Conjugate (PCV-13) 9/19/2016 12:13 PM  
 Pneumococcal Polysaccharide (PPSV-23) 9/5/2016, 11/26/2014 10:15 AM, 7/1/2008 Tdap 9/19/2016 12:12 PM, 8/12/2015 11:18 AM  
  
 Not reviewed this visit You Were Diagnosed With   
  
 Codes Comments Chronic congestive heart failure, unspecified congestive heart failure type ([facility]    -  Primary ICD-10-CM: I50.9 ICD-9-CM: 428.0 Essential hypertension     ICD-10-CM: I10 
ICD-9-CM: 401.9 Vitals BP Pulse Temp Resp Height(growth percentile) Weight(growth percentile) 166/68 (BP 1 Location: Right arm, BP Patient Position: Sitting) 67 97.1 °F (36.2 °C) (Oral) 16 4' 10\" (1.473 m) 106 lb (48.1 kg) SpO2 BMI OB Status Smoking Status 100% 22.15 kg/m2 Postmenopausal Former Smoker BMI and BSA Data Body Mass Index Body Surface Area  
 22.15 kg/m 2 1.4 m 2 Preferred Pharmacy Pharmacy Name Phone [id number] [phone] Your Updated Medication List  
  
   
This list is accurate as of: 4/14/17 10:26 AM.  Always use your most recent med list.  
  
  
  
  
 alendronate 70 mg tablet Commonly known as:  FOSAMAX Take 1 weekly with a 2000 unit vitamin D tablet  
  
 aspirin 81 mg tablet Take 81 mg by mouth. atorvastatin 20 mg tablet Commonly known as:  LIPITOR Take 1 Tab by mouth daily. bumetanide 2 mg tablet Commonly known as:  Velton Sandifer Take 2 mg by mouth daily. calcium carbonate 600 mg calcium (1,500 mg) tablet Commonly known as:  Root Read Take 600 mg by mouth two (2) times a day. carvedilol 6.25 mg tablet Commonly known as:  Suzie  Take 1 Tab by mouth two (2) times daily (with meals). CENTRUM SILVER Tab tablet Generic drug:  multivitamins-minerals-lutein Take  by mouth. diclofenac 1 % Gel Commonly known as:  VOLTAREN Apply 4 g to affected area four (4) times daily. esomeprazole 40 mg capsule Commonly known as:  NEXIUM  
take 1 capsule by mouth once daily  
  
 ferrous sulfate, dried 159 mg (45 mg iron) Tber tablet Commonly known as:  SLOW RELEASE IRON Take 1 Tab by mouth daily. guaiFENesin  mg SR tablet Commonly known as:  Giovanni & Giovanni Take 1 Tab by mouth two (2) times a day. lisinopril 10 mg tablet Commonly known as:  Isha Gravel Take 1 Tab by mouth daily. NASONEX 50 mcg/actuation nasal spray Generic drug:  mometasone  
instill 2 sprays into each nostril once daily  
  
 oxyCODONE-acetaminophen 5-325 mg per tablet Commonly known as:  PERCOCET Take 1 Tab by mouth every eight (8) hours as needed. Max Daily Amount: 3 Tabs. potassium chloride 20 mEq tablet Commonly known as:  K-DUR, KLOR-CON Take 1 Tab by mouth daily. rOPINIRole 0.5 mg tablet Commonly known as:  Danya Glass Take 1 Tab by mouth two (2) times a day. * warfarin 1 mg tablet Commonly known as:  COUMADIN Take one every Tuesday, Thursday, Saturday and Sunday. Skip dose every Monday, Wednesday and Friday * warfarin 6 mg tablet Commonly known as:  COUMADIN  
 take 1 tablet by mouth once daily * Notice: This list has 2 medication(s) that are the same as other medications prescribed for you. Read the directions carefully, and ask your doctor or other care provider to review them with you. Prescriptions Sent to Pharmacy Refills  
 diclofenac (VOLTAREN) 1 % gel 3 Sig: Apply 4 g to affected area four (4) times daily. Class: Normal  
 Pharmacy: [facility] [id number] [address] [address] [place] [address] Ph #: [phone] Route: Topical  
 atorvastatin (LIPITOR) 20 mg tablet 3 Sig: Take 1 Tab by mouth daily. Class: Normal  
 Pharmacy: [facility] [id number] [address] [address] [place] [address] Ph #: [phone] Route: Oral  
 lisinopril (PRINIVIL, ZESTRIL) 10 mg tablet 1 Sig: Take 1 Tab by mouth daily. Class: Normal  
 [id number] [address] [address] [place] [address] Ph #: [phone] Route: Oral  
 carvedilol (COREG) 6.25 mg tablet 1 Sig: Take 1 Tab by mouth two (2) times daily (with meals). Class: Normal  
 Pharmacy: [facility] [id number] [address] [address] [place] [address] Ph #: [phone] Route: Oral  
  
We Performed the Following AMB POC PT/INR [17533 CPT(R)] Follow-up Instructions Return in about 1 month (around 5/14/2017). Introducing [facility] & HEALTH SERVICES! [facility] introduces [employer] patient portal. Now you can access parts of your medical record, email your doctor's office, and request medication refills online. 1. In your internet browser, go to [email or web address] 2. Click on the First Time User? Click Here link in the Sign In box. You will see the New Member Sign Up page. 3. Enter your [employer] Access Code exactly as it appears below. You will not need to use this code after youve completed the sign-up process.  If you do not sign up before the expiration date, you must request a new code. 
 
· True[employer]ider Access Code: [email or web address] Expires: 5/16/2017 11:46 AM 
 
4. Enter the last four digits of your Social Security Number (xxxx) and Date of Birth (mm/dd/yyyy) as indicated and click Submit. You will be taken to the next sign-up page. 5. Create a TrueInsider ID. This will be your TrueIns[employer] login ID and cannot be changed, so think of one that is secure and easy to remember. 6. Create a TrueIns[employer] password. You can change your password at any time. 7. Enter your Password Reset Question and Answer. This can be used at a later time if you forget your password. 8. Enter your e-mail address. You will receive e-mail notification when new information is available in [address] 9. Click Sign Up. You can now view and download portions of your medical record. 10. Click the Download Summary menu link to download a portable copy of your medical information. If you have questions, please visit the Frequently Asked Questions section of the True[employer] website. Remember, True[employer] is NOT to be used for urgent needs. For medical emergencies, dial 911. Now available from your iPhone and Android! Please provide this summary of care documentation to your next provider. Your primary care clinician is listed as Wilma Fuller. If you have any questions after today's visit, please call [phone].

## 2017-04-14 NOTE — PROGRESS NOTES
HISTORY OF PRESENT ILLNESS  Liz Martin is a 80 y.o. female. HPI  chf stable  hbp stable  Review of Systems   All other systems reviewed and are negative. Past Medical History:   Diagnosis Date    Heart valve replaced     Hypercholesterolemia     Hypertension      Current Outpatient Prescriptions on File Prior to Visit   Medication Sig Dispense Refill    atorvastatin (LIPITOR) 20 mg tablet take 1 tablet by mouth once daily 90 Tab 3    esomeprazole (NEXIUM) 40 mg capsule take 1 capsule by mouth once daily 90 Cap 1    oxyCODONE-acetaminophen (PERCOCET) 5-325 mg per tablet Take 1 Tab by mouth every eight (8) hours as needed. Max Daily Amount: 3 Tabs. 10 Tab 0    potassium chloride (K-DUR, KLOR-CON) 20 mEq tablet Take 1 Tab by mouth daily. 90 Tab 1    warfarin (COUMADIN) 6 mg tablet take 1 tablet by mouth once daily 90 Tab 3    NASONEX 50 mcg/actuation nasal spray instill 2 sprays into each nostril once daily 3 Container 3    ferrous sulfate, dried (SLOW RELEASE IRON) 159 mg (45 mg iron) TbER tablet Take 1 Tab by mouth daily. 30 Tab 0    carvedilol (COREG) 6.25 mg tablet Take 1 Tab by mouth two (2) times daily (with meals). 180 Tab 1    lisinopril (PRINIVIL, ZESTRIL) 10 mg tablet Take 1 Tab by mouth daily. 90 Tab 1    rOPINIRole (REQUIP) 0.5 mg tablet Take 1 Tab by mouth two (2) times a day. 180 Tab 3    alendronate (FOSAMAX) 70 mg tablet Take 1 weekly with a 2000 unit vitamin D tablet 12 Tab 3    warfarin (COUMADIN) 1 mg tablet Take one every Tuesday, Thursday, Saturday and Sunday. Skip dose every Monday, Wednesday and Friday 100 Tab 3    guaiFENesin SR (MUCINEX) 600 mg SR tablet Take 1 Tab by mouth two (2) times a day. 20 Tab 0    aspirin 81 mg tablet Take 81 mg by mouth.  multivitamins-minerals-lutein (CENTRUM SILVER) Tab Take  by mouth.  calcium carbonate (CALTREX) 600 mg (1,500 mg) tablet Take 600 mg by mouth two (2) times a day.         bumetanide (BUMEX) 2 mg tablet Take 2 mg by mouth daily. No current facility-administered medications on file prior to visit. Visit Vitals    /68 (BP 1 Location: Right arm, BP Patient Position: Sitting)    Pulse 67    Temp 97.1 °F (36.2 °C) (Oral)    Resp 16    Ht 4' 10\" (1.473 m)    Wt 106 lb (48.1 kg)    SpO2 100%    BMI 22.15 kg/m2         Physical Exam   Constitutional: She appears well-developed and well-nourished. No distress. Musculoskeletal: Normal range of motion. She exhibits no edema, tenderness or deformity. Skin: Skin is warm and dry. No rash noted. She is not diaphoretic. No erythema. No pallor. Vitals reviewed.     INR 1.9  ASSESSMENT and PLAN  chf   hbp  Plan  Refills  Recheck in 1 month

## 2017-05-15 ENCOUNTER — OFFICE VISIT (OUTPATIENT)
Dept: FAMILY MEDICINE CLINIC | Age: 82
End: 2017-05-15

## 2017-05-15 VITALS
WEIGHT: 105 LBS | TEMPERATURE: 97.7 F | RESPIRATION RATE: 18 BRPM | OXYGEN SATURATION: 98 % | HEART RATE: 61 BPM | SYSTOLIC BLOOD PRESSURE: 139 MMHG | BODY MASS INDEX: 22.04 KG/M2 | DIASTOLIC BLOOD PRESSURE: 60 MMHG | HEIGHT: 58 IN

## 2017-05-15 DIAGNOSIS — Z95.2 H/O PROSTHETIC HEART VALVE: ICD-10-CM

## 2017-05-15 DIAGNOSIS — I48.20 CHRONIC ATRIAL FIBRILLATION (HCC): Primary | ICD-10-CM

## 2017-05-15 LAB
INR BLD: 2.6
PT POC: NORMAL SEC
VALID INTERNAL CONTROL?: YES

## 2017-05-15 NOTE — PROGRESS NOTES
HISTORY OF PRESENT ILLNESS  Danni Roe is a 80 y.o. female. HPI  A fib stable  C/o wgt loss, ? dietary  Review of Systems   All other systems reviewed and are negative. Past Medical History:   Diagnosis Date    Heart valve replaced     Hypercholesterolemia     Hypertension      Current Outpatient Prescriptions on File Prior to Visit   Medication Sig Dispense Refill    diclofenac (VOLTAREN) 1 % gel Apply 4 g to affected area four (4) times daily. 500 g 3    atorvastatin (LIPITOR) 20 mg tablet Take 1 Tab by mouth daily. 90 Tab 3    lisinopril (PRINIVIL, ZESTRIL) 10 mg tablet Take 1 Tab by mouth daily. 90 Tab 1    carvedilol (COREG) 6.25 mg tablet Take 1 Tab by mouth two (2) times daily (with meals). 180 Tab 1    esomeprazole (NEXIUM) 40 mg capsule take 1 capsule by mouth once daily 90 Cap 1    oxyCODONE-acetaminophen (PERCOCET) 5-325 mg per tablet Take 1 Tab by mouth every eight (8) hours as needed. Max Daily Amount: 3 Tabs. 10 Tab 0    potassium chloride (K-DUR, KLOR-CON) 20 mEq tablet Take 1 Tab by mouth daily. 90 Tab 1    warfarin (COUMADIN) 6 mg tablet take 1 tablet by mouth once daily 90 Tab 3    NASONEX 50 mcg/actuation nasal spray instill 2 sprays into each nostril once daily 3 Container 3    ferrous sulfate, dried (SLOW RELEASE IRON) 159 mg (45 mg iron) TbER tablet Take 1 Tab by mouth daily. 30 Tab 0    rOPINIRole (REQUIP) 0.5 mg tablet Take 1 Tab by mouth two (2) times a day. 180 Tab 3    alendronate (FOSAMAX) 70 mg tablet Take 1 weekly with a 2000 unit vitamin D tablet 12 Tab 3    warfarin (COUMADIN) 1 mg tablet Take one every Tuesday, Thursday, Saturday and Sunday. Skip dose every Monday, Wednesday and Friday 100 Tab 3    guaiFENesin SR (MUCINEX) 600 mg SR tablet Take 1 Tab by mouth two (2) times a day. 20 Tab 0    aspirin 81 mg tablet Take 81 mg by mouth.  multivitamins-minerals-lutein (CENTRUM SILVER) Tab Take  by mouth.         calcium carbonate (CALTREX) 600 mg (1,500 mg) tablet Take 600 mg by mouth two (2) times a day.  bumetanide (BUMEX) 2 mg tablet Take 2 mg by mouth daily. No current facility-administered medications on file prior to visit. Visit Vitals    /60 (BP 1 Location: Right arm, BP Patient Position: Sitting)    Pulse 61    Temp 97.7 °F (36.5 °C) (Oral)    Resp 18    Ht 4' 10\" (1.473 m)    Wt 105 lb (47.6 kg)    SpO2 98%    BMI 21.95 kg/m2         Physical Exam   Constitutional: She appears well-developed and well-nourished. No distress. Cardiovascular: Normal rate, regular rhythm and intact distal pulses. Exam reveals no gallop and no friction rub. Murmur heard. Pulmonary/Chest: Effort normal and breath sounds normal. No respiratory distress. She has no wheezes. She has no rales. She exhibits no tenderness. Abdominal: Soft. Bowel sounds are normal. She exhibits no distension and no mass. There is no tenderness. There is no rebound and no guarding. Skin: Skin is warm and dry. No rash noted. She is not diaphoretic. No erythema. No pallor. Vitals reviewed.     inr 2.6    ASSESSMENT and PLAN  a fib   Plan  Recheck in 1 month

## 2017-05-15 NOTE — MR AVS SNAPSHOT
Visit Information Date & Time Provider Department Dept. Phone Encounter #  
 5/15/2017 10:00 AM Jeancarlos PeñaDa [facility] [phone] [id number] Upcoming Health Maintenance Date Due INFLUENZA AGE 9 TO ADULT 8/1/2017 MEDICARE YEARLY EXAM 8/20/2017 GLAUCOMA SCREENING Q2Y 1/12/2019 DTaP/Tdap/Td series (2 - Td) 9/19/2026 Allergies as of 5/15/2017  Review Complete On: 5/15/2017 By: Jeancarlos Peña MD  
 No Known Allergies Current Immunizations  Reviewed on 8/12/2015 Name Date Influenza High Dose Vaccine PF 9/19/2016 12:12 PM  
 Influenza Vaccine 10/24/2014 11:37 AM, 11/12/2013 11:04 AM, 7/1/2008 Influenza Vaccine PF 9/5/2016 Pneumococcal Conjugate (PCV-13) 9/19/2016 12:13 PM  
 Pneumococcal Polysaccharide (PPSV-23) 9/5/2016, 11/26/2014 10:15 AM, 7/1/2008 Tdap 9/19/2016 12:12 PM, 8/12/2015 11:18 AM  
  
 Not reviewed this visit You Were Diagnosed With   
  
 Codes Comments Chronic atrial fibrillation (HCC)    -  Primary ICD-10-CM: I52.8 ICD-9-CM: 427.31 Vitals BP Pulse Temp Resp Height(growth percentile) Weight(growth percentile) 139/60 (BP 1 Location: Right arm, BP Patient Position: Sitting) 61 97.7 °F (36.5 °C) (Oral) 18 4' 10\" (1.473 m) 105 lb (47.6 kg) SpO2 BMI OB Status Smoking Status 98% 21.95 kg/m2 Postmenopausal Former Smoker BMI and BSA Data Body Mass Index Body Surface Area  
 21.95 kg/m 2 1.4 m 2 Preferred Pharmacy Pharmacy Name Phone [id number] [address] [id number] [address] [place] [phone] Your Updated Medication List  
  
   
This list is accurate as of: 5/15/17 10:46 AM.  Always use your most recent med list.  
  
  
  
  
 alendronate 70 mg tablet Commonly known as:  FOSAMAX Take 1 weekly with a 2000 unit vitamin D tablet  
  
 aspirin 81 mg tablet Take 81 mg by mouth. atorvastatin 20 mg tablet Commonly known as:  LIPITOR Take 1 Tab by mouth daily. bumetanide 2 mg tablet Commonly known as:  Beula Haleigh Take 2 mg by mouth daily. calcium carbonate 600 mg calcium (1,500 mg) tablet Commonly known as:  Nelly Bob Take 600 mg by mouth two (2) times a day. carvedilol 6.25 mg tablet Commonly known as:  Liu Burnette Take 1 Tab by mouth two (2) times daily (with meals). CENTRUM SILVER Tab tablet Generic drug:  multivitamins-minerals-lutein Take  by mouth. diclofenac 1 % Gel Commonly known as:  VOLTAREN Apply 4 g to affected area four (4) times daily. esomeprazole 40 mg capsule Commonly known as:  NEXIUM  
take 1 capsule by mouth once daily  
  
 ferrous sulfate, dried 159 mg (45 mg iron) Tber tablet Commonly known as:  SLOW RELEASE IRON Take 1 Tab by mouth daily. guaiFENesin  mg SR tablet Commonly known as:  Giovanni & Giovanni Take 1 Tab by mouth two (2) times a day. lisinopril 10 mg tablet Commonly known as:  Shana Feil Take 1 Tab by mouth daily. NASONEX 50 mcg/actuation nasal spray Generic drug:  mometasone  
instill 2 sprays into each nostril once daily  
  
 oxyCODONE-acetaminophen 5-325 mg per tablet Commonly known as:  PERCOCET Take 1 Tab by mouth every eight (8) hours as needed. Max Daily Amount: 3 Tabs. potassium chloride 20 mEq tablet Commonly known as:  K-DUR, KLOR-CON Take 1 Tab by mouth daily. rOPINIRole 0.5 mg tablet Commonly known as:  Dipak Sicks Take 1 Tab by mouth two (2) times a day. * warfarin 1 mg tablet Commonly known as:  COUMADIN Take one every Tuesday, Thursday, Saturday and Sunday. Skip dose every Monday, Wednesday and Friday * warfarin 6 mg tablet Commonly known as:  COUMADIN  
take 1 tablet by mouth once daily * Notice: This list has 2 medication(s) that are the same as other medications prescribed for you.  Read the directions carefully, and ask your doctor or other care provider to review them with you. We Performed the Following AMB POC PT/INR [34807 CPT(R)] Introducing [facility] & HE[facility] SERVICES! Airam Bethea introduces 58.com patient portal. Now you can access parts of your medical record, email your doctor's office, and request medication refills online. 1. In your internet browser, go to [email or web address] 2. Click on the First Time User? Click Here link in the Sign In box. You will see the New Member Sign Up page. 3. Enter your 58.com Access Code exactly as it appears below. You will not need to use this code after youve completed the sign-up process. If you do not sign up before the expiration date, you must request a new code. · 58.com Access Code: [email or web address] Expires: 5/16/2017 11:46 AM 
 
4. Enter the last four digits of your Social Security Number (xxxx) and Date of Birth (mm/dd/yyyy) as indicated and click Submit. You will be taken to the next sign-up page. 5. Create a 58.com ID. This will be your 58.com login ID and cannot be changed, so think of one that is secure and easy to remember. 6. Create a 58.com password. You can change your password at any time. 7. Enter your Password Reset Question and Answer. This can be used at a later time if you forget your password. 8. Enter your e-mail address. You will receive e-mail notification when new information is available in [address] 9. Click Sign Up. You can now view and download portions of your medical record. 10. Click the Download Summary menu link to download a portable copy of your medical information. If you have questions, please visit the Frequently Asked Questions section of the 58.com website. Remember, 58.com is NOT to be used for urgent needs. For medical emergencies, dial 911. Now available from your iPhone and Android! Please provide this summary of care documentation to your next provider. Your primary care clinician is listed as Cordelia Morales. If you have any questions after today's visit, please call [phone].

## 2017-05-15 NOTE — PROGRESS NOTES
Chief Complaint   Patient presents with    Coagulation disorder    Fall     patient had a fall one week ago c/o rib discomfort  pain scale 0/10    Head Injury     head injury with washer machine door   pain scale 0/10     1. Have you been to the ER, urgent care clinic since your last visit? Hospitalized since your last visit? No    2. Have you seen or consulted any other health care providers outside of the [address] [place] [address] since your last visit? Include any pap smears or colon screening.  No

## 2017-05-24 RX ORDER — WARFARIN 1 MG/1
TABLET ORAL
Qty: 100 TAB | Refills: 3 | Status: SHIPPED | OUTPATIENT
Start: 2017-05-24 | End: 2018-02-26 | Stop reason: ALTCHOICE

## 2017-05-24 RX ORDER — ALENDRONATE SODIUM 70 MG/1
TABLET ORAL
Qty: 12 TAB | Refills: 3 | Status: SHIPPED | OUTPATIENT
Start: 2017-05-24 | End: 2018-05-19 | Stop reason: SDUPTHER

## 2017-06-09 ENCOUNTER — TELEPHONE (OUTPATIENT)
Dept: FAMILY MEDICINE CLINIC | Age: 82
End: 2017-06-09

## 2017-06-09 NOTE — TELEPHONE ENCOUNTER
Faxed referral and authorization to [phone]  Confirmation received    Authorization number [id number]  Put in file for scanning

## 2017-06-09 NOTE — TELEPHONE ENCOUNTER
Cardiology called in regards to the pt she has an appt this  @ 8:10am for a f/u appt.  Her [employer] referral  in March and she will need a new referral. The diagnosic code is [facility]   [phone]  Fax  [phone]

## 2017-06-16 ENCOUNTER — OFFICE VISIT (OUTPATIENT)
Dept: FAMILY MEDICINE CLINIC | Age: 82
End: 2017-06-16

## 2017-06-16 ENCOUNTER — HOSPITAL ENCOUNTER (OUTPATIENT)
Dept: LAB | Age: 82
Discharge: HOME | End: 2017-06-16

## 2017-06-16 VITALS
RESPIRATION RATE: 16 BRPM | BODY MASS INDEX: 21.37 KG/M2 | DIASTOLIC BLOOD PRESSURE: 69 MMHG | HEART RATE: 60 BPM | WEIGHT: 101.8 LBS | TEMPERATURE: 98.5 F | OXYGEN SATURATION: 97 % | HEIGHT: 58 IN | SYSTOLIC BLOOD PRESSURE: 150 MMHG

## 2017-06-16 DIAGNOSIS — H35.3131 EARLY STAGE NONEXUDATIVE AGE-RELATED MACULAR DEGENERATION OF BOTH EYES: ICD-10-CM

## 2017-06-16 DIAGNOSIS — Z01.00 EXAMINATION OF EYES AND VISION: ICD-10-CM

## 2017-06-16 DIAGNOSIS — I48.20 CHRONIC ATRIAL FIBRILLATION (HCC): Primary | ICD-10-CM

## 2017-06-16 DIAGNOSIS — H35.30 MACULAR DEGENERATION: ICD-10-CM

## 2017-06-16 PROCEDURE — 99001 SPECIMEN HANDLING PT-LAB: CPT | Performed by: INTERNAL MEDICINE

## 2017-06-16 RX ORDER — SPIRONOLACTONE 25 MG/1
25 TABLET ORAL DAILY
COMMUNITY
End: 2018-02-26 | Stop reason: ALTCHOICE

## 2017-06-16 NOTE — MR AVS SNAPSHOT
Visit Information Date & Time Provider Department Dept. Phone Encounter #  
 6/16/2017 11:30 AM Tyson Avila, [employer] [phone] [id number] Follow-up Instructions Return in about 1 month (around 7/16/2017). Follow-up and Disposition History Upcoming Health Maintenance Date Due INFLUENZA AGE 9 TO ADULT 8/1/2017 MEDICARE YEARLY EXAM 8/20/2017 GLAUCOMA SCREENING Q2Y 1/12/2019 DTaP/Tdap/Td series (2 - Td) 9/19/2026 Allergies as of 6/16/2017  Review Complete On: 6/16/2017 By: Tyson Avila MD  
 No Known Allergies Current Immunizations  Reviewed on 8/12/2015 Name Date Influenza High Dose Vaccine PF 9/19/2016 12:12 PM  
 Influenza Vaccine 10/24/2014 11:37 AM, 11/12/2013 11:04 AM, 7/1/2008 Influenza Vaccine PF 9/5/2016 Pneumococcal Conjugate (PCV-13) 9/19/2016 12:13 PM  
 Pneumococcal Polysaccharide (PPSV-23) 9/5/2016, 11/26/2014 10:15 AM, 7/1/2008 Tdap 9/19/2016 12:12 PM, 8/12/2015 11:18 AM  
  
 Not reviewed this visit You Were Diagnosed With   
  
 Codes Comments Chronic atrial fibrillation (HCC)    -  Primary ICD-10-CM: Z58.2 ICD-9-CM: 427.31 Vitals BP Pulse Temp Resp Height(growth percentile) Weight(growth percentile) 150/69 60 98.5 °F (36.9 °C) 16 4' 10\" (1.473 m) 101 lb 12.8 oz (46.2 kg) SpO2 BMI OB Status Smoking Status 97% 21.28 kg/m2 Postmenopausal Former Smoker Vitals History BMI and BSA Data Body Mass Index Body Surface Area  
 21.28 kg/m 2 1.37 m 2 Preferred Pharmacy Pharmacy Name Phone [facility] [address] [address] [place] [phone] Your Updated Medication List  
  
   
This list is accurate as of: 6/16/17 11:42 AM.  Always use your most recent med list.  
  
  
  
  
 alendronate 70 mg tablet Commonly known as:  FOSAMAX  
take 1 tablet by mouth every week with 2,000 UNITS VITAMIN D TABLET aspirin 81 mg tablet Take 81 mg by mouth. atorvastatin 20 mg tablet Commonly known as:  LIPITOR Take 1 Tab by mouth daily. bumetanide 2 mg tablet Commonly known as:  Liza Rash Take 2 mg by mouth daily. calcium carbonate 600 mg calcium (1,500 mg) tablet Commonly known as:  Amelia Arm Take 600 mg by mouth two (2) times a day. carvedilol 6.25 mg tablet Commonly known as:  Ashanti Stable Take 1 Tab by mouth two (2) times daily (with meals). CENTRUM SILVER Tab tablet Generic drug:  multivitamins-minerals-lutein Take  by mouth. diclofenac 1 % Gel Commonly known as:  VOLTAREN Apply 4 g to affected area four (4) times daily. esomeprazole 40 mg capsule Commonly known as:  NEXIUM  
take 1 capsule by mouth once daily  
  
 ferrous sulfate, dried 159 mg (45 mg iron) Tber tablet Commonly known as:  SLOW RELEASE IRON Take 1 Tab by mouth daily. guaiFENesin  mg SR tablet Commonly known as:  Giovanni & Giovanni Take 1 Tab by mouth two (2) times a day. lisinopril 10 mg tablet Commonly known as:  Roxianne Daughters Take 1 Tab by mouth daily. NASONEX 50 mcg/actuation nasal spray Generic drug:  mometasone  
instill 2 sprays into each nostril once daily  
  
 oxyCODONE-acetaminophen 5-325 mg per tablet Commonly known as:  PERCOCET Take 1 Tab by mouth every eight (8) hours as needed. Max Daily Amount: 3 Tabs. potassium chloride 20 mEq tablet Commonly known as:  K-DUR, KLOR-CON Take 1 Tab by mouth daily. rOPINIRole 0.5 mg tablet Commonly known as:  Whitney Billie Take 1 Tab by mouth two (2) times a day. spironolactone 25 mg tablet Commonly known as:  ALDACTONE Take 25 mg by mouth daily. * warfarin 6 mg tablet Commonly known as:  COUMADIN  
take 1 tablet by mouth once daily * warfarin 1 mg tablet Commonly known as:  COUMADIN  
TAKE 1 TABLET BY MOUTH ON TUESDAY,THURSDAY,SATURDAY, AND SUNDAY.  SKIP DOSE EVERY MONDAY, [address], AND FRIDAY. * Notice: This list has 2 medication(s) that are the same as other medications prescribed for you. Read the directions carefully, and ask your doctor or other care provider to review them with you. Follow-up Instructions Return in about 1 month (around 7/16/2017). To-Do List   
 06/16/2017 Lab:  METABOLIC PANEL, BASIC Introducing [facility] & HEALTH SERVICES! Aashish Jacobs introduces [employer] patient portal. Now you can access parts of your medical record, email your doctor's office, and request medication refills online. 1. In your internet browser, go to [email or web address] 2. Click on the First Time User? Click Here link in the Sign In box. You will see the New Member Sign Up page. 3. Enter your [employer] Access Code exactly as it appears below. You will not need to use this code after youve completed the sign-up process. If you do not sign up before the expiration date, you must request a new code. · [employer] Access Code: [email or web address] Expires: 9/14/2017 11:42 AM 
 
4. Enter the last four digits of your Social Security Number (xxxx) and Date of Birth (mm/dd/yyyy) as indicated and click Submit. You will be taken to the next sign-up page. 5. Create a [employer] ID. This will be your [employer] login ID and cannot be changed, so think of one that is secure and easy to remember. 6. Create a [employer] password. You can change your password at any time. 7. Enter your Password Reset Question and Answer. This can be used at a later time if you forget your password. 8. Enter your e-mail address. You will receive e-mail notification when new information is available in [address] 9. Click Sign Up. You can now view and download portions of your medical record. 10. Click the Download Summary menu link to download a portable copy of your medical information.  
 
If you have questions, please visit the Frequently Asked Questions section of the [employer]. Remember, [employer] is NOT to be used for urgent needs. For medical emergencies, dial 911. Now available from your iPhone and Android! Please provide this summary of care documentation to your next provider. Your primary care clinician is listed as Bebe Last. If you have any questions after today's visit, please call [phone].

## 2017-06-16 NOTE — PROGRESS NOTES
Chief Complaint   Patient presents with    Coagulation disorder    Hypertension          1. Have you been to the ER, urgent care clinic since your last visit? Hospitalized since your last visit? No    2. Have you seen or consulted any other health care providers outside of the [address] [place] [address] since your last visit? Include any pap smears or colon screening.  Yes Where: Cardiology

## 2017-06-16 NOTE — PROGRESS NOTES
Follow up for anticoagulation.   Indication: Atrial fibrillation  Current medication: warfarinas noted  Exam:   Visit Vitals    /69    Pulse 60    Temp 98.5 °F (36.9 °C)    Resp 16    Ht 4' 10\" (1.473 m)    Wt 101 lb 12.8 oz (46.2 kg)    SpO2 97%    BMI 21.28 kg/m2     General Appearance:  Fit appearing  INR:2.0  A/P:  Atrial fibrillation  Dose adjustment: no change

## 2017-06-17 LAB
BUN SERPL-MCNC: 26 MG/DL (ref 8–27)
BUN/CREAT SERPL: 23 (ref 12–28)
CALCIUM SERPL-MCNC: 10.5 MG/DL (ref 8.7–10.3)
CHLORIDE SERPL-SCNC: 96 MMOL/L (ref 96–106)
CO2 SERPL-SCNC: 26 MMOL/L (ref 18–29)
CREAT SERPL-MCNC: 1.15 MG/DL (ref 0.57–1)
GLUCOSE SERPL-MCNC: 101 MG/DL (ref 65–99)
INTERPRETATION: NORMAL
POTASSIUM SERPL-SCNC: 4.1 MMOL/L (ref 3.5–5.2)
SODIUM SERPL-SCNC: 140 MMOL/L (ref 134–144)

## 2017-06-22 RX ORDER — ROPINIROLE 0.5 MG/1
TABLET, FILM COATED ORAL
Qty: 180 TAB | Refills: 3 | Status: SHIPPED | OUTPATIENT
Start: 2017-06-22 | End: 2018-06-11 | Stop reason: SDUPTHER

## 2017-06-29 ENCOUNTER — TELEPHONE (OUTPATIENT)
Dept: FAMILY MEDICINE CLINIC | Age: 82
End: 2017-06-29

## 2017-06-29 NOTE — TELEPHONE ENCOUNTER
[facility] called to request an updated referral for this pt.     Pt is seeing Dr. Baltazar Yasmeen: [phone]  Tax: [id number]  Appt on 7/12  Diagnosis: [address], Z01.00  Phone: [phone]  Fax: [phone]

## 2017-07-17 ENCOUNTER — OFFICE VISIT (OUTPATIENT)
Dept: FAMILY MEDICINE CLINIC | Age: 82
End: 2017-07-17

## 2017-07-17 ENCOUNTER — HOSPITAL ENCOUNTER (OUTPATIENT)
Dept: LAB | Age: 82
Discharge: HOME | End: 2017-07-17

## 2017-07-17 VITALS
RESPIRATION RATE: 18 BRPM | WEIGHT: 101.2 LBS | HEART RATE: 66 BPM | SYSTOLIC BLOOD PRESSURE: 137 MMHG | HEIGHT: 58 IN | TEMPERATURE: 97.5 F | BODY MASS INDEX: 21.24 KG/M2 | OXYGEN SATURATION: 99 % | DIASTOLIC BLOOD PRESSURE: 63 MMHG

## 2017-07-17 DIAGNOSIS — R53.83 FATIGUE, UNSPECIFIED TYPE: ICD-10-CM

## 2017-07-17 DIAGNOSIS — R79.89 AZOTEMIA: ICD-10-CM

## 2017-07-17 DIAGNOSIS — I48.20 CHRONIC ATRIAL FIBRILLATION (HCC): Primary | ICD-10-CM

## 2017-07-17 DIAGNOSIS — R63.4 WEIGHT LOSS: ICD-10-CM

## 2017-07-17 DIAGNOSIS — R42 DIZZINESS: ICD-10-CM

## 2017-07-17 LAB
INR BLD: 1.8
PT POC: NORMAL SECONDS
VALID INTERNAL CONTROL?: YES

## 2017-07-17 PROCEDURE — 99001 SPECIMEN HANDLING PT-LAB: CPT | Performed by: INTERNAL MEDICINE

## 2017-07-17 NOTE — PROGRESS NOTES
Chief Complaint   Patient presents with    Irregular Heart Beat    Coagulation disorder     Pain scale 0/10      1. Have you been to the ER, urgent care clinic since your last visit? Hospitalized since your last visit? No    2. Have you seen or consulted any other health care providers outside of the [address] [place] [address] since your last visit? Include any pap smears or colon screening.  No

## 2017-07-17 NOTE — PROGRESS NOTES
HISTORY OF PRESENT ILLNESS  Theo Mixon is a 80 y.o. female. HPI  A fib stable  C/o fatigue, dizziness  Review of Systems   Constitutional: Positive for malaise/fatigue. Neurological: Positive for dizziness. All other systems reviewed and are negative. Past Medical History:   Diagnosis Date    Congestive heart failure ([address]     Heart valve replaced     Hypercholesterolemia     Hypertension      Current Outpatient Prescriptions on File Prior to Visit   Medication Sig Dispense Refill    rOPINIRole (REQUIP) 0.5 mg tablet take 1 tablet by mouth twice a day 180 Tab 3    spironolactone (ALDACTONE) 25 mg tablet Take 25 mg by mouth daily.  warfarin (COUMADIN) 1 mg tablet TAKE 1 TABLET BY MOUTH ON TUESDAY,THURSDAY,SATURDAY, AND SUNDAY. SKIP DOSE EVERY MONDAY, WEDNESDAY, AND FRIDAY. 100 Tab 3    alendronate (FOSAMAX) 70 mg tablet take 1 tablet by mouth every week with 2,000 UNITS VITAMIN D TABLET 12 Tab 3    diclofenac (VOLTAREN) 1 % gel Apply 4 g to affected area four (4) times daily. 500 g 3    atorvastatin (LIPITOR) 20 mg tablet Take 1 Tab by mouth daily. 90 Tab 3    lisinopril (PRINIVIL, ZESTRIL) 10 mg tablet Take 1 Tab by mouth daily. (Patient taking differently: Take 20 mg by mouth daily.) 90 Tab 1    carvedilol (COREG) 6.25 mg tablet Take 1 Tab by mouth two (2) times daily (with meals). 180 Tab 1    esomeprazole (NEXIUM) 40 mg capsule take 1 capsule by mouth once daily 90 Cap 1    potassium chloride (K-DUR, KLOR-CON) 20 mEq tablet Take 1 Tab by mouth daily. 90 Tab 1    warfarin (COUMADIN) 6 mg tablet take 1 tablet by mouth once daily 90 Tab 3    NASONEX 50 mcg/actuation nasal spray instill 2 sprays into each nostril once daily 3 Container 3    ferrous sulfate, dried (SLOW RELEASE IRON) 159 mg (45 mg iron) TbER tablet Take 1 Tab by mouth daily. 30 Tab 0    guaiFENesin SR (MUCINEX) 600 mg SR tablet Take 1 Tab by mouth two (2) times a day.  20 Tab 0    aspirin 81 mg tablet Take 81 mg by mouth.        multivitamins-minerals-lutein (CENTRUM SILVER) Tab Take  by mouth.  calcium carbonate (CALTREX) 600 mg (1,500 mg) tablet Take 600 mg by mouth two (2) times a day.  bumetanide (BUMEX) 2 mg tablet Take 2 mg by mouth daily.  oxyCODONE-acetaminophen (PERCOCET) 5-325 mg per tablet Take 1 Tab by mouth every eight (8) hours as needed. Max Daily Amount: 3 Tabs. 10 Tab 0     No current facility-administered medications on file prior to visit. Visit Vitals    /63 (BP 1 Location: Right arm, BP Patient Position: Sitting)    Pulse 66    Temp 97.5 °F (36.4 °C) (Oral)    Resp 18    Ht 4' 10\" (1.473 m)    Wt 101 lb 3.2 oz (45.9 kg)    SpO2 99%    BMI 21.15 kg/m2         Physical Exam   Constitutional: She appears well-developed and well-nourished. No distress. Cardiovascular: Normal rate, regular rhythm, normal heart sounds and intact distal pulses. Exam reveals no gallop and no friction rub. No murmur heard. Pulmonary/Chest: Effort normal and breath sounds normal. No respiratory distress. She has no wheezes. She has no rales. She exhibits no tenderness. Skin: She is not diaphoretic. Vitals reviewed.     inr 1.8  ASSESSMENT and PLAN  a fib   Plan  Take extra 1 mg weds  Recheck in 1 month

## 2017-07-17 NOTE — MR AVS SNAPSHOT
Visit Information Date & Time Provider Department Dept. Phone Encounter #  
 7/17/2017 11:15 AM Sukhjinder Hooper, [facility] [phone] [id number] Follow-up Instructions Return in about 1 month (around 8/17/2017). Follow-up and Disposition History Upcoming Health Maintenance Date Due INFLUENZA AGE 9 TO ADULT 8/1/2017 MEDICARE YEARLY EXAM 8/20/2017 GLAUCOMA SCREENING Q2Y 1/12/2019 DTaP/Tdap/Td series (2 - Td) 9/19/2026 Allergies as of 7/17/2017  Review Complete On: 7/17/2017 By: Sukhjinder Hooper MD  
 No Known Allergies Current Immunizations  Reviewed on 8/12/2015 Name Date Influenza High Dose Vaccine PF 9/19/2016 12:12 PM  
 Influenza Vaccine 10/24/2014 11:37 AM, 11/12/2013 11:04 AM, 7/1/2008 Influenza Vaccine PF 9/5/2016 Pneumococcal Conjugate (PCV-13) 9/19/2016 12:13 PM  
 Pneumococcal Polysaccharide (PPSV-23) 9/5/2016, 11/26/2014 10:15 AM, 7/1/2008 Tdap 9/19/2016 12:12 PM, 8/12/2015 11:18 AM  
  
 Not reviewed this visit You Were Diagnosed With   
  
 Codes Comments Chronic atrial fibrillation (HCC)    -  Primary ICD-10-CM: Y79.5 ICD-9-CM: 427.31 Dizziness     ICD-10-CM: G28 ICD-9-CM: 780.4 Fatigue, unspecified type     ICD-10-CM: R53.83 ICD-9-CM: 780.79 Weight loss     ICD-10-CM: R63.4 ICD-9-CM: 783.21 Vitals BP Pulse Temp Resp Height(growth percentile) Weight(growth percentile)  
 137/63 (BP 1 Location: Right arm, BP Patient Position: Sitting) 66 97.5 °F (36.4 °C) (Oral) 18 4' 10\" (1.473 m) 101 lb 3.2 oz (45.9 kg) SpO2 BMI OB Status Smoking Status 99% 21.15 kg/m2 Postmenopausal Former Smoker Vitals History BMI and BSA Data Body Mass Index Body Surface Area  
 21.15 kg/m 2 1.37 m 2 Preferred Pharmacy Pharmacy Name Phone [facility] [address] [address] [place] [phone] Your Updated Medication List  
 This list is accurate as of: 7/17/17 12:20 PM.  Always use your most recent med list.  
  
  
  
  
 alendronate 70 mg tablet Commonly known as:  FOSAMAX  
take 1 tablet by mouth every week with 2,000 UNITS VITAMIN D TABLET  
  
 aspirin 81 mg tablet Take 81 mg by mouth. atorvastatin 20 mg tablet Commonly known as:  LIPITOR Take 1 Tab by mouth daily. bumetanide 2 mg tablet Commonly known as:  Leanord Miu Take 2 mg by mouth daily. calcium carbonate 600 mg calcium (1,500 mg) tablet Commonly known as:  Lorelei Deiters Take 600 mg by mouth two (2) times a day. carvedilol 6.25 mg tablet Commonly known as:  Pennye Louder Take 1 Tab by mouth two (2) times daily (with meals). CENTRUM SILVER Tab tablet Generic drug:  multivitamins-minerals-lutein Take  by mouth. diclofenac 1 % Gel Commonly known as:  VOLTAREN Apply 4 g to affected area four (4) times daily. esomeprazole 40 mg capsule Commonly known as:  NEXIUM  
take 1 capsule by mouth once daily  
  
 ferrous sulfate, dried 159 mg (45 mg iron) Tber tablet Commonly known as:  SLOW RELEASE IRON Take 1 Tab by mouth daily. guaiFENesin  mg SR tablet Commonly known as:  Giovanni & Giovanni Take 1 Tab by mouth two (2) times a day. lisinopril 10 mg tablet Commonly known as:  Solo Boehringer Take 1 Tab by mouth daily. NASONEX 50 mcg/actuation nasal spray Generic drug:  mometasone  
instill 2 sprays into each nostril once daily  
  
 oxyCODONE-acetaminophen 5-325 mg per tablet Commonly known as:  PERCOCET Take 1 Tab by mouth every eight (8) hours as needed. Max Daily Amount: 3 Tabs. potassium chloride 20 mEq tablet Commonly known as:  K-DUR, KLOR-CON Take 1 Tab by mouth daily. rOPINIRole 0.5 mg tablet Commonly known as:  REQUIP  
take 1 tablet by mouth twice a day  
  
 spironolactone 25 mg tablet Commonly known as:  ALDACTONE Take 25 mg by mouth daily. * warfarin 6 mg tablet Commonly known as:  COUMADIN  
take 1 tablet by mouth once daily * warfarin 1 mg tablet Commonly known as:  COUMADIN  
TAKE 1 TABLET BY MOUTH ON TUESDAY,THURSDAY,SATURDAY, AND SUNDAY. SKIP DOSE EVERY MONDAY, WEDNESDAY, AND FRIDAY. * Notice: This list has 2 medication(s) that are the same as other medications prescribed for you. Read the directions carefully, and ask your doctor or other care provider to review them with you. We Performed the Following AMB POC PT/INR [82909 CPT(R)] Follow-up Instructions Return in about 1 month (around 8/17/2017). To-Do List   
 07/17/2017 Lab:  CBC WITH AUTOMATED DIFF   
  
 07/17/2017 Lab:  METABOLIC PANEL, COMPREHENSIVE   
  
 07/17/2017 Lab:  PROTEIN ELECTROPHORESIS Introducing [facility]! Pascale Lucas introduces [employer]Quest patient portal. Now you can access parts of your medical record, email your doctor's office, and request medication refills online. 1. In your internet browser, go to [email or web address] 2. Click on the First Time User? Click Here link in the Sign In box. You will see the New Member Sign Up page. 3. Enter your [employer]Quest Access Code exactly as it appears below. You will not need to use this code after youve completed the sign-up process. If you do not sign up before the expiration date, you must request a new code. · TestQuest Access Code: [email or web address] Expires: 9/14/2017 11:42 AM 
 
4. Enter the last four digits of your Social Security Number (xxxx) and Date of Birth (mm/dd/yyyy) as indicated and click Submit. You will be taken to the next sign-up page. 5. Create a TestQuest ID. This will be your TestQuest login ID and cannot be changed, so think of one that is secure and easy to remember. 6. Create a TestQuest password. You can change your password at any time. 7. Enter your Password Reset Question and Answer.  This can be used at a later time if you forget your password. 8. Enter your e-mail address. You will receive e-mail notification when new information is available in [address] 9. Click Sign Up. You can now view and download portions of your medical record. 10. Click the Download Summary menu link to download a portable copy of your medical information. If you have questions, please visit the Frequently Asked Questions section of the Nextdoor website. Remember, Nextdoor is NOT to be used for urgent needs. For medical emergencies, dial 911. Now available from your iPhone and Android! Please provide this summary of care documentation to your next provider. Your primary care clinician is listed as Halley Mcintosh. If you have any questions after today's visit, please call [phone].

## 2017-07-19 LAB
ALBUMIN SERPL ELPH-MCNC: 4 G/DL (ref 2.9–4.4)
ALBUMIN SERPL-MCNC: 4.2 G/DL (ref 3.5–4.7)
ALBUMIN/GLOB SERPL: 1.2 {RATIO} (ref 0.7–1.7)
ALBUMIN/GLOB SERPL: 1.4 {RATIO} (ref 1.2–2.2)
ALP SERPL-CCNC: 55 IU/L (ref 39–117)
ALPHA1 GLOB SERPL ELPH-MCNC: 0.2 G/DL (ref 0–0.4)
ALPHA2 GLOB SERPL ELPH-MCNC: 0.5 G/DL (ref 0.4–1)
ALT SERPL-CCNC: 14 IU/L (ref 0–32)
AST SERPL-CCNC: 27 IU/L (ref 0–40)
B-GLOBULIN SERPL ELPH-MCNC: 1.3 G/DL (ref 0.7–1.3)
BASOPHILS # BLD AUTO: 0 X10E3/UL (ref 0–0.2)
BASOPHILS NFR BLD AUTO: 0 %
BILIRUB SERPL-MCNC: 0.4 MG/DL (ref 0–1.2)
BUN SERPL-MCNC: 73 MG/DL (ref 8–27)
BUN/CREAT SERPL: 45 (ref 12–28)
CALCIUM SERPL-MCNC: 9.9 MG/DL (ref 8.7–10.3)
CHLORIDE SERPL-SCNC: 95 MMOL/L (ref 96–106)
CO2 SERPL-SCNC: 25 MMOL/L (ref 18–29)
CREAT SERPL-MCNC: 1.62 MG/DL (ref 0.57–1)
EOSINOPHIL # BLD AUTO: 0.2 X10E3/UL (ref 0–0.4)
EOSINOPHIL NFR BLD AUTO: 2 %
ERYTHROCYTE [DISTWIDTH] IN BLOOD BY AUTOMATED COUNT: 14 % (ref 12.3–15.4)
GAMMA GLOB SERPL ELPH-MCNC: 1.3 G/DL (ref 0.4–1.8)
GLOBULIN SER CALC-MCNC: 3.1 G/DL (ref 1.5–4.5)
GLOBULIN SER CALC-MCNC: 3.3 G/DL (ref 2.2–3.9)
GLUCOSE SERPL-MCNC: 86 MG/DL (ref 65–99)
HCT VFR BLD AUTO: 36 % (ref 34–46.6)
HGB BLD-MCNC: 11.9 G/DL (ref 11.1–15.9)
IMM GRANULOCYTES # BLD: 0 X10E3/UL (ref 0–0.1)
IMM GRANULOCYTES NFR BLD: 0 %
INTERPRETATION: NORMAL
LYMPHOCYTES # BLD AUTO: 1.1 X10E3/UL (ref 0.7–3.1)
LYMPHOCYTES NFR BLD AUTO: 15 %
M PROTEIN SERPL ELPH-MCNC: NORMAL G/DL
MCH RBC QN AUTO: 32.5 PG (ref 26.6–33)
MCHC RBC AUTO-ENTMCNC: 33.1 G/DL (ref 31.5–35.7)
MCV RBC AUTO: 98 FL (ref 79–97)
MONOCYTES # BLD AUTO: 0.5 X10E3/UL (ref 0.1–0.9)
MONOCYTES NFR BLD AUTO: 7 %
NEUTROPHILS # BLD AUTO: 5.4 X10E3/UL (ref 1.4–7)
NEUTROPHILS NFR BLD AUTO: 76 %
PLATELET # BLD AUTO: 176 X10E3/UL (ref 150–379)
PLEASE NOTE, 011150: NORMAL
POTASSIUM SERPL-SCNC: 4.7 MMOL/L (ref 3.5–5.2)
PROT SERPL-MCNC: 7.3 G/DL (ref 6–8.5)
RBC # BLD AUTO: 3.66 X10E6/UL (ref 3.77–5.28)
SODIUM SERPL-SCNC: 138 MMOL/L (ref 134–144)
WBC # BLD AUTO: 7.2 X10E3/UL (ref 3.4–10.8)

## 2017-07-20 NOTE — PROGRESS NOTES
Call placed to patient, gave resulted note, patient accepting. Please order the labs you want rechecked next week.

## 2017-07-26 ENCOUNTER — HOSPITAL ENCOUNTER (OUTPATIENT)
Dept: LAB | Age: 82
Discharge: HOME | End: 2017-07-26

## 2017-07-26 PROCEDURE — 99001 SPECIMEN HANDLING PT-LAB: CPT | Performed by: INTERNAL MEDICINE

## 2017-07-27 LAB
BUN SERPL-MCNC: 50 MG/DL (ref 8–27)
BUN/CREAT SERPL: 36 (ref 12–28)
CALCIUM SERPL-MCNC: 9.6 MG/DL (ref 8.7–10.3)
CHLORIDE SERPL-SCNC: 103 MMOL/L (ref 96–106)
CO2 SERPL-SCNC: 20 MMOL/L (ref 18–29)
CREAT SERPL-MCNC: 1.37 MG/DL (ref 0.57–1)
GLUCOSE SERPL-MCNC: 89 MG/DL (ref 65–99)
INTERPRETATION: NORMAL
POTASSIUM SERPL-SCNC: 5.1 MMOL/L (ref 3.5–5.2)
SODIUM SERPL-SCNC: 138 MMOL/L (ref 134–144)

## 2017-08-16 ENCOUNTER — DOCUMENTATION ONLY (OUTPATIENT)
Dept: FAMILY MEDICINE CLINIC | Age: 82
End: 2017-08-16

## 2017-08-16 ENCOUNTER — OFFICE VISIT (OUTPATIENT)
Dept: FAMILY MEDICINE CLINIC | Age: 82
End: 2017-08-16

## 2017-08-16 VITALS
HEIGHT: 58 IN | SYSTOLIC BLOOD PRESSURE: 144 MMHG | OXYGEN SATURATION: 98 % | DIASTOLIC BLOOD PRESSURE: 65 MMHG | WEIGHT: 104.4 LBS | BODY MASS INDEX: 21.92 KG/M2 | TEMPERATURE: 97.1 F | RESPIRATION RATE: 16 BRPM | HEART RATE: 62 BPM

## 2017-08-16 DIAGNOSIS — I48.20 CHRONIC ATRIAL FIBRILLATION (HCC): Primary | ICD-10-CM

## 2017-08-16 LAB
INR BLD: 1.9
PT POC: 22.8 SECONDS
VALID INTERNAL CONTROL?: YES

## 2017-08-16 RX ORDER — CARVEDILOL 3.12 MG/1
3.12 TABLET ORAL 2 TIMES DAILY WITH MEALS
Qty: 60 TAB | Refills: 3
Start: 2017-08-16 | End: 2018-09-25

## 2017-08-16 NOTE — PROGRESS NOTES
HISTORY OF PRESENT ILLNESS  Howard Webber is a 80 y.o. female. HPI  chf stable  Dehydration improved with stopping Bumex, reducing coreg  Review of Systems   All other systems reviewed and are negative. Past Medical History:   Diagnosis Date    Congestive heart failure ([address]     Heart valve replaced     Hypercholesterolemia     Hypertension        Current Outpatient Prescriptions:     rOPINIRole (REQUIP) 0.5 mg tablet, take 1 tablet by mouth twice a day, Disp: 180 Tab, Rfl: 3    spironolactone (ALDACTONE) 25 mg tablet, Take 25 mg by mouth daily. , Disp: , Rfl:     warfarin (COUMADIN) 1 mg tablet, TAKE 1 TABLET BY MOUTH ON TUESDAY,THURSDAY,SATURDAY, AND SUNDAY. SKIP DOSE EVERY MONDAY, WEDNESDAY, AND FRIDAY. , Disp: 100 Tab, Rfl: 3    alendronate (FOSAMAX) 70 mg tablet, take 1 tablet by mouth every week with 2,000 UNITS VITAMIN D TABLET, Disp: 12 Tab, Rfl: 3    diclofenac (VOLTAREN) 1 % gel, Apply 4 g to affected area four (4) times daily. , Disp: 500 g, Rfl: 3    atorvastatin (LIPITOR) 20 mg tablet, Take 1 Tab by mouth daily. , Disp: 90 Tab, Rfl: 3    lisinopril (PRINIVIL, ZESTRIL) 10 mg tablet, Take 1 Tab by mouth daily. (Patient taking differently: Take 20 mg by mouth daily.), Disp: 90 Tab, Rfl: 1    carvedilol (COREG) 6.25 mg tablet, Take 1 Tab by mouth two (2) times daily (with meals). , Disp: 180 Tab, Rfl: 1    esomeprazole (NEXIUM) 40 mg capsule, take 1 capsule by mouth once daily, Disp: 90 Cap, Rfl: 1    potassium chloride (K-DUR, KLOR-CON) 20 mEq tablet, Take 1 Tab by mouth daily. , Disp: 90 Tab, Rfl: 1    warfarin (COUMADIN) 6 mg tablet, take 1 tablet by mouth once daily, Disp: 90 Tab, Rfl: 3    NASONEX 50 mcg/actuation nasal spray, instill 2 sprays into each nostril once daily, Disp: 3 Container, Rfl: 3    ferrous sulfate, dried (SLOW RELEASE IRON) 159 mg (45 mg iron) TbER tablet, Take 1 Tab by mouth daily. , Disp: 30 Tab, Rfl: 0    guaiFENesin SR (MUCINEX) 600 mg SR tablet, Take 1 Tab by mouth two (2) times a day., Disp: 20 Tab, Rfl: 0    aspirin 81 mg tablet, Take 81 mg by mouth.  , Disp: , Rfl:     multivitamins-minerals-lutein (CENTRUM SILVER) Tab, Take  by mouth.  , Disp: , Rfl:     calcium carbonate (CALTREX) 600 mg (1,500 mg) tablet, Take 600 mg by mouth two (2) times a day.  , Disp: , Rfl:     oxyCODONE-acetaminophen (PERCOCET) 5-325 mg per tablet, Take 1 Tab by mouth every eight (8) hours as needed. Max Daily Amount: 3 Tabs., Disp: 10 Tab, Rfl: 0    bumetanide (BUMEX) 2 mg tablet, Take 2 mg by mouth daily. , Disp: , Rfl:   Visit Vitals    /65 (BP 1 Location: Right arm, BP Patient Position: Sitting)    Pulse 62    Temp 97.1 °F (36.2 °C) (Oral)    Resp 16    Ht 4' 10\" (1.473 m)    Wt 104 lb 6.4 oz (47.4 kg)    SpO2 98%    BMI 21.82 kg/m2         Physical Exam   Constitutional: She appears well-developed and well-nourished. No distress. Cardiovascular: Normal rate. Exam reveals no gallop and no friction rub. Murmur heard. + click  Occasional skip   Skin: She is not diaphoretic. Vitals reviewed.   inr 1.9    ASSESSMENT and PLAN  chf   Plan  Take 1 extra coumadin today  Continue current rx  Recheck in 1 month

## 2017-08-16 NOTE — PROGRESS NOTES
Sonya Sr is a 80 y.o. female who presents today for annual wellness exam. Pain scale 0/10    Health Maintenance Due   Topic Date Due    INFLUENZA AGE 9 TO ADULT  08/01/2017           1. Have you been to the ER, urgent care clinic since your last visit? Hospitalized since your last visit? No    2. Have you seen or consulted any other health care providers outside of the [address] [place] [address] since your last visit? Include any pap smears or colon screening. No    Learning Assessment 3/6/2015   PRIMARY LEARNER Patient   HIGHEST LEVEL OF EDUCATION - PRIMARY LEARNER  DID NOT GRADUATE HIGH SCHOOL   BARRIERS PRIMARY LEARNER NONE   CO-LEARNER CAREGIVER No   PRIMARY LANGUAGE ENGLISH    NEED No   LEARNER PREFERENCE PRIMARY DEMONSTRATION   ANSWERED BY Patient   RELATIONSHIP SELF       Abuse Screening Questionnaire 10/7/2015   Do you ever feel afraid of your partner? N   Are you in a relationship with someone who physically or mentally threatens you? N   Is it safe for you to go home?  Lizz Saucedo

## 2017-08-16 NOTE — MR AVS SNAPSHOT
Visit Information Date & Time Provider Department Dept. Phone Encounter #  
 8/16/2017 11:15 AM Iveth Oden, [facility] [phone] [id number] Follow-up Instructions Return in about 1 month (around 9/16/2017). Follow-up and Disposition History Upcoming Health Maintenance Date Due INFLUENZA AGE 9 TO ADULT 8/1/2017 MEDICARE YEARLY EXAM 8/20/2017 GLAUCOMA SCREENING Q2Y 1/12/2019 DTaP/Tdap/Td series (2 - Td) 9/19/2026 Allergies as of 8/16/2017  Review Complete On: 8/16/2017 By: Iveth Oden MD  
 No Known Allergies Current Immunizations  Reviewed on 8/12/2015 Name Date Influenza High Dose Vaccine PF 9/19/2016 12:12 PM  
 Influenza Vaccine 10/24/2014 11:37 AM, 11/12/2013 11:04 AM, 7/1/2008 Influenza Vaccine PF 9/5/2016 Pneumococcal Conjugate (PCV-13) 9/19/2016 12:13 PM  
 Pneumococcal Polysaccharide (PPSV-23) 9/5/2016, 11/26/2014 10:15 AM, 7/1/2008 Tdap 9/19/2016 12:12 PM, 8/12/2015 11:18 AM  
  
 Not reviewed this visit You Were Diagnosed With   
  
 Codes Comments Chronic atrial fibrillation (HCC)    -  Primary ICD-10-CM: D02.5 ICD-9-CM: 427.31 Vitals BP Pulse Temp Resp Height(growth percentile) Weight(growth percentile) 144/65 (BP 1 Location: Right arm, BP Patient Position: Sitting) 62 97.1 °F (36.2 °C) (Oral) 16 4' 10\" (1.473 m) 104 lb 6.4 oz (47.4 kg) SpO2 BMI OB Status Smoking Status 98% 21.82 kg/m2 Postmenopausal Former Smoker BMI and BSA Data Body Mass Index Body Surface Area  
 21.82 kg/m 2 1.39 m 2 Preferred Pharmacy Pharmacy Name Phone [address] [id number] [address] [phone] Your Updated Medication List  
  
   
This list is accurate as of: 8/16/17 12:07 PM.  Always use your most recent med list.  
  
  
  
  
 alendronate 70 mg tablet Commonly known as:  FOSAMAX take 1 tablet by mouth every week with 2,000 UNITS VITAMIN D TABLET  
  
 aspirin 81 mg tablet Take 81 mg by mouth. atorvastatin 20 mg tablet Commonly known as:  LIPITOR Take 1 Tab by mouth daily. calcium carbonate 600 mg calcium (1,500 mg) tablet Commonly known as:  Gisel Chi Take 600 mg by mouth two (2) times a day. carvedilol 3.125 mg tablet Commonly known as:  Ellen Syl Take 1 Tab by mouth two (2) times daily (with meals). CENTRUM SILVER Tab tablet Generic drug:  multivitamins-minerals-lutein Take  by mouth. diclofenac 1 % Gel Commonly known as:  VOLTAREN Apply 4 g to affected area four (4) times daily. esomeprazole 40 mg capsule Commonly known as:  NEXIUM  
take 1 capsule by mouth once daily  
  
 ferrous sulfate, dried 159 mg (45 mg iron) Tber tablet Commonly known as:  SLOW RELEASE IRON Take 1 Tab by mouth daily. guaiFENesin  mg SR tablet Commonly known as:  Giovanni & Giovanni Take 1 Tab by mouth two (2) times a day. lisinopril 10 mg tablet Commonly known as:  Oliver Littler Take 1 Tab by mouth daily. NASONEX 50 mcg/actuation nasal spray Generic drug:  mometasone  
instill 2 sprays into each nostril once daily  
  
 oxyCODONE-acetaminophen 5-325 mg per tablet Commonly known as:  PERCOCET Take 1 Tab by mouth every eight (8) hours as needed. Max Daily Amount: 3 Tabs. potassium chloride 20 mEq tablet Commonly known as:  K-DUR, KLOR-CON Take 1 Tab by mouth daily. rOPINIRole 0.5 mg tablet Commonly known as:  REQUIP  
take 1 tablet by mouth twice a day  
  
 spironolactone 25 mg tablet Commonly known as:  ALDACTONE Take 25 mg by mouth daily. * warfarin 6 mg tablet Commonly known as:  COUMADIN  
take 1 tablet by mouth once daily * warfarin 1 mg tablet Commonly known as:  COUMADIN  
TAKE 1 TABLET BY MOUTH ON TUESDAY,THURSDAY,SATURDAY, AND SUNDAY.  SKIP DOSE EVERY MONDAY, [address], AND FRIDAY. * Notice: This list has 2 medication(s) that are the same as other medications prescribed for you. Read the directions carefully, and ask your doctor or other care provider to review them with you. We Performed the Following AMB POC PT/INR [06216 CPT(R)] Follow-up Instructions Return in about 1 month (around 9/16/2017). Introducing [facility]! Hannah Willis introduces [facility] patient portal. Now you can access parts of your medical record, email your doctor's office, and request medication refills online. 1. In your internet browser, go to [email or web address] 2. Click on the First Time User? Click Here link in the Sign In box. You will see the New Member Sign Up page. 3. Enter your [facility] Access Code exactly as it appears below. You will not need to use this code after youve completed the sign-up process. If you do not sign up before the expiration date, you must request a new code. · [facility] Access Code: [email or web address] Expires: 9/14/2017 11:42 AM 
 
4. Enter the last four digits of your Social Security Number (xxxx) and Date of Birth (mm/dd/yyyy) as indicated and click Submit. You will be taken to the next sign-up page. 5. Create a [facility] ID. This will be your [facility] login ID and cannot be changed, so think of one that is secure and easy to remember. 6. Create a [facility] password. You can change your password at any time. 7. Enter your Password Reset Question and Answer. This can be used at a later time if you forget your password. 8. Enter your e-mail address. You will receive e-mail notification when new information is available in [address] 9. Click Sign Up. You can now view and download portions of your medical record. 10. Click the Download Summary menu link to download a portable copy of your medical information.  
 
If you have questions, please visit the Frequently Asked Questions section of the [employer]. Remember, [employer] is NOT to be used for urgent needs. For medical emergencies, dial 911. Now available from your iPhone and Android! Please provide this summary of care documentation to your next provider. Your primary care clinician is listed as Chicho Tovar. If you have any questions after today's visit, please call [phone].

## 2017-08-29 ENCOUNTER — TELEPHONE (OUTPATIENT)
Dept: FAMILY MEDICINE CLINIC | Age: 82
End: 2017-08-29

## 2017-08-29 NOTE — TELEPHONE ENCOUNTER
Pt called inquiring about which one of her current medications she is suppose to cut in half? Please notify pt when information is provided.

## 2017-09-20 ENCOUNTER — HOSPITAL ENCOUNTER (OUTPATIENT)
Dept: LAB | Age: 82
Discharge: HOME | End: 2017-09-20

## 2017-09-20 ENCOUNTER — OFFICE VISIT (OUTPATIENT)
Dept: FAMILY MEDICINE CLINIC | Age: 82
End: 2017-09-20

## 2017-09-20 VITALS
HEIGHT: 58 IN | HEART RATE: 68 BPM | TEMPERATURE: 96.9 F | WEIGHT: 102.8 LBS | SYSTOLIC BLOOD PRESSURE: 150 MMHG | DIASTOLIC BLOOD PRESSURE: 62 MMHG | BODY MASS INDEX: 21.58 KG/M2 | RESPIRATION RATE: 16 BRPM | OXYGEN SATURATION: 97 %

## 2017-09-20 DIAGNOSIS — Z23 ENCOUNTER FOR IMMUNIZATION: ICD-10-CM

## 2017-09-20 DIAGNOSIS — Z95.2 H/O PROSTHETIC HEART VALVE: Primary | ICD-10-CM

## 2017-09-20 DIAGNOSIS — I50.9 CONGESTIVE HEART FAILURE, UNSPECIFIED CONGESTIVE HEART FAILURE CHRONICITY, UNSPECIFIED CONGESTIVE HEART FAILURE TYPE: ICD-10-CM

## 2017-09-20 DIAGNOSIS — Z00.00 ROUTINE GENERAL MEDICAL EXAMINATION AT A HEALTH CARE FACILITY: ICD-10-CM

## 2017-09-20 DIAGNOSIS — R63.4 WEIGHT LOSS: ICD-10-CM

## 2017-09-20 LAB
INR BLD: 2.6
PT POC: NORMAL SECONDS
VALID INTERNAL CONTROL?: YES

## 2017-09-20 PROCEDURE — 99001 SPECIMEN HANDLING PT-LAB: CPT | Performed by: INTERNAL MEDICINE

## 2017-09-20 RX ORDER — MOMETASONE FUROATE 50 UG/1
2 SPRAY, METERED NASAL DAILY
Qty: 3 CONTAINER | Refills: 3 | Status: SHIPPED | OUTPATIENT
Start: 2017-09-20 | End: 2019-05-10 | Stop reason: ALTCHOICE

## 2017-09-20 NOTE — ACP (ADVANCE CARE PLANNING)
Advance Care Planning    Advance Care Planning (ACP) Provider Conversation Snapshot    Date of ACP Conversation: 09/20/17  Persons included in Conversation:  patient and family  Length of ACP Conversation in minutes:  25 minutes    Authorized Decision Maker (if patient is incapable of making informed decisions):    This person is:   Healthcare Agent/Medical Power of  under Advance Directive          For Patients with Decision Making Capacity:   Values/Goals: Exploration of values, goals, and preferences if recovery is not expected, even with continued medical treatment in the event of:  Imminent death  Severe, permanent brain injury    Conversation Outcomes / Follow-Up Plan:   Recommended completion of Advance Directive form after review of ACP materials and conversation with prospective healthcare agent     acp reviewed  Has a lving will  Needs to get a copy for epic  ~ 16 minutes

## 2017-09-20 NOTE — PROGRESS NOTES
HISTORY OF PRESENT ILLNESS  Micha Miller is a 80 y.o. female. HPI  Prosthetic heart valve stable  chf stable  C/o wgt loss  Review of Systems   Constitutional: Positive for malaise/fatigue and weight loss. All other systems reviewed and are negative. Past Medical History:   Diagnosis Date    Congestive heart failure ([address]     Heart valve replaced     Hypercholesterolemia     Hypertension      Current Outpatient Prescriptions on File Prior to Visit   Medication Sig Dispense Refill    carvedilol (COREG) 3.125 mg tablet Take 1 Tab by mouth two (2) times daily (with meals). 60 Tab 3    rOPINIRole (REQUIP) 0.5 mg tablet take 1 tablet by mouth twice a day 180 Tab 3    spironolactone (ALDACTONE) 25 mg tablet Take 25 mg by mouth daily.  warfarin (COUMADIN) 1 mg tablet TAKE 1 TABLET BY MOUTH ON TUESDAY,THURSDAY,SATURDAY, AND SUNDAY. SKIP DOSE EVERY MONDAY, WEDNESDAY, AND FRIDAY. 100 Tab 3    alendronate (FOSAMAX) 70 mg tablet take 1 tablet by mouth every week with 2,000 UNITS VITAMIN D TABLET 12 Tab 3    diclofenac (VOLTAREN) 1 % gel Apply 4 g to affected area four (4) times daily. 500 g 3    atorvastatin (LIPITOR) 20 mg tablet Take 1 Tab by mouth daily. 90 Tab 3    lisinopril (PRINIVIL, ZESTRIL) 10 mg tablet Take 1 Tab by mouth daily. (Patient taking differently: Take 20 mg by mouth daily.) 90 Tab 1    esomeprazole (NEXIUM) 40 mg capsule take 1 capsule by mouth once daily 90 Cap 1    oxyCODONE-acetaminophen (PERCOCET) 5-325 mg per tablet Take 1 Tab by mouth every eight (8) hours as needed. Max Daily Amount: 3 Tabs. 10 Tab 0    potassium chloride (K-DUR, KLOR-CON) 20 mEq tablet Take 1 Tab by mouth daily. 90 Tab 1    warfarin (COUMADIN) 6 mg tablet take 1 tablet by mouth once daily 90 Tab 3    NASONEX 50 mcg/actuation nasal spray instill 2 sprays into each nostril once daily 3 Container 3    ferrous sulfate, dried (SLOW RELEASE IRON) 159 mg (45 mg iron) TbER tablet Take 1 Tab by mouth daily. 30 Tab 0    guaiFENesin SR (MUCINEX) 600 mg SR tablet Take 1 Tab by mouth two (2) times a day. 20 Tab 0    aspirin 81 mg tablet Take 81 mg by mouth.  multivitamins-minerals-lutein (CENTRUM SILVER) Tab Take  by mouth.  calcium carbonate (CALTREX) 600 mg (1,500 mg) tablet Take 600 mg by mouth two (2) times a day. No current facility-administered medications on file prior to visit. Visit Vitals    /62 (BP 1 Location: Right arm, BP Patient Position: Sitting)    Pulse 68    Temp 96.9 °F (36.1 °C) (Oral)    Resp 16    Ht 4' 10\" (1.473 m)    Wt 102 lb 12.8 oz (46.6 kg)    SpO2 97%    BMI 21.49 kg/m2       Physical Exam   Constitutional: She appears well-developed and well-nourished. No distress. Cardiovascular: Normal rate, regular rhythm and intact distal pulses. Exam reveals no gallop and no friction rub. Murmur heard. + click   Pulmonary/Chest: Effort normal and breath sounds normal. No respiratory distress. She has no wheezes. She has no rales. She exhibits no tenderness. Skin: Skin is warm and dry. No rash noted. She is not diaphoretic. No erythema. No pallor. Vitals reviewed. reviewed labs  INR 2.6  ASSESSMENT and PLAN  chf   Weight loss  Plan  Labs  Recheck in 1 month    This is an Initial Medicare Annual Wellness Exam (AWV) (Performed 12 months after IPPE or effective date of Medicare Part B enrollment, Once in a lifetime)    I have reviewed the patient's medical history in detail and updated the computerized patient record.      History     Past Medical History:   Diagnosis Date    Congestive heart failure ([address]     Heart valve replaced     Hypercholesterolemia     Hypertension       Past Surgical History:   Procedure Laterality Date    CARDIAC SURG PROCEDURE UNLIST      heart valve    HX ORTHOPAEDIC      total knee    HX PACEMAKER  2016     Current Outpatient Prescriptions   Medication Sig Dispense Refill    carvedilol (COREG) 3.125 mg tablet Take 1 Tab by mouth two (2) times daily (with meals). 60 Tab 3    rOPINIRole (REQUIP) 0.5 mg tablet take 1 tablet by mouth twice a day 180 Tab 3    spironolactone (ALDACTONE) 25 mg tablet Take 25 mg by mouth daily.  warfarin (COUMADIN) 1 mg tablet TAKE 1 TABLET BY MOUTH ON TUESDAY,THURSDAY,SATURDAY, AND SUNDAY. SKIP DOSE EVERY MONDAY, WEDNESDAY, AND FRIDAY. 100 Tab 3    alendronate (FOSAMAX) 70 mg tablet take 1 tablet by mouth every week with 2,000 UNITS VITAMIN D TABLET 12 Tab 3    diclofenac (VOLTAREN) 1 % gel Apply 4 g to affected area four (4) times daily. 500 g 3    atorvastatin (LIPITOR) 20 mg tablet Take 1 Tab by mouth daily. 90 Tab 3    lisinopril (PRINIVIL, ZESTRIL) 10 mg tablet Take 1 Tab by mouth daily. (Patient taking differently: Take 20 mg by mouth daily.) 90 Tab 1    esomeprazole (NEXIUM) 40 mg capsule take 1 capsule by mouth once daily 90 Cap 1    oxyCODONE-acetaminophen (PERCOCET) 5-325 mg per tablet Take 1 Tab by mouth every eight (8) hours as needed. Max Daily Amount: 3 Tabs. 10 Tab 0    potassium chloride (K-DUR, KLOR-CON) 20 mEq tablet Take 1 Tab by mouth daily. 90 Tab 1    warfarin (COUMADIN) 6 mg tablet take 1 tablet by mouth once daily 90 Tab 3    NASONEX 50 mcg/actuation nasal spray instill 2 sprays into each nostril once daily 3 Container 3    ferrous sulfate, dried (SLOW RELEASE IRON) 159 mg (45 mg iron) TbER tablet Take 1 Tab by mouth daily. 30 Tab 0    guaiFENesin SR (MUCINEX) 600 mg SR tablet Take 1 Tab by mouth two (2) times a day. 20 Tab 0    aspirin 81 mg tablet Take 81 mg by mouth.  multivitamins-minerals-lutein (CENTRUM SILVER) Tab Take  by mouth.  calcium carbonate (CALTREX) 600 mg (1,500 mg) tablet Take 600 mg by mouth two (2) times a day. No Known Allergies  No family history on file.   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    Pleural effusion J90    Chronic pulmonary heart disease (HCC) I27.9    Heart valve replaced Z95.2    History of total knee replacement Z96.659    Hypoxemia R09.02    Other specified cardiac dysrhythmias I49.8    Endocarditis I38    Acute congestive heart failure (HCC) I50.9    Cardiomyopathy (HCC) I42.9       Depression Risk Factor Screening:     PHQ over the last two weeks 9/20/2017   Little interest or pleasure in doing things Not at all   Feeling down, depressed or hopeless Not at all   Total Score PHQ 2 0     Alcohol Risk Factor Screening: You do not drink alcohol or very rarely. Functional Ability and Level of Safety:     Hearing Loss  Hearing is good. Activities of Daily Living  The home contains: no safety equipment  Patient does total self care    Fall RiskFall Risk Assessment, last 12 mths 9/20/2017   Able to walk? Yes   Fall in past 12 months?  No   Fall with injury? -   Number of falls in past 12 months -   Fall Risk Score -       Abuse Screen  Patient is not abused    Cognitive Screening   Evaluation of Cognitive Function:  Has your family/caregiver stated any concerns about your memory: no  Normal    Patient Care Team   Patient Care Team:  Mendel Requena, MD as PCP - General (Internal Medicine)  Deann Garcia RN as Ambulatory Care Navigator    Assessment/Plan   Education and counseling provided:  Are appropriate based on today's review and evaluation  End-of-Life planning (with patient's consent)  Pneumococcal Vaccine  Influenza Vaccine  Screening Mammography  Screening Pap and pelvic (covered once every 2 years)  Colorectal cancer screening tests  Bone mass measurement (DEXA)    Diagnoses and all orders for this visit:    1. H/O prosthetic heart valve  -     AMB POC PT/INR    2. Encounter for immunization  -     Influenza virus vaccine ([address]) 72 years and older ([address])  -     Administration fee () for Medicare insured patients         There are no preventive care reminders to display for this patient.

## 2017-09-20 NOTE — MR AVS SNAPSHOT
Visit Information Date & Time Provider Department Dept. Phone Encounter #  
 9/20/2017 10:45 AM Juwan Moreira, [facility] [phone] [id number] Follow-up Instructions Return in about 1 month (around 10/20/2017). Upcoming Health Maintenance Date Due  
 MEDICARE YEARLY EXAM 9/21/2018 GLAUCOMA SCREENING Q2Y 8/10/2019 DTaP/Tdap/Td series (2 - Td) 9/19/2026 Allergies as of 9/20/2017  Review Complete On: 9/20/2017 By: Juwan Moreira MD  
 No Known Allergies Current Immunizations  Reviewed on 8/12/2015 Name Date Influenza High Dose Vaccine PF  Incomplete, 9/19/2016 12:12 PM  
 Influenza Vaccine 10/24/2014 11:37 AM, 11/12/2013 11:04 AM, 7/1/2008 Influenza Vaccine PF 9/5/2016 Pneumococcal Conjugate (PCV-13) 9/19/2016 12:13 PM  
 Pneumococcal Polysaccharide (PPSV-23) 9/5/2016, 11/26/2014 10:15 AM, 7/1/2008 Tdap 9/19/2016 12:12 PM, 8/12/2015 11:18 AM  
  
 Not reviewed this visit You Were Diagnosed With   
  
 Codes Comments H/O prosthetic heart valve    -  Primary ICD-10-CM: Z95.2 ICD-9-CM: V43.3 Encounter for immunization     ICD-10-CM: W59 ICD-9-CM: V03.89 Weight loss     ICD-10-CM: R63.4 ICD-9-CM: 783.21 Congestive heart failure, unspecified congestive heart failure chronicity, unspecified congestive heart failure type ([facility]     ICD-10-CM: I50.9 ICD-9-CM: 428.0 Routine general medical examination at a health care facility     ICD-10-CM: Z00.00 ICD-9-CM: V70.0 Vitals BP Pulse Temp Resp Height(growth percentile) Weight(growth percentile) 150/62 (BP 1 Location: Right arm, BP Patient Position: Sitting) 68 96.9 °F (36.1 °C) (Oral) 16 4' 10\" (1.473 m) 102 lb 12.8 oz (46.6 kg) SpO2 BMI OB Status Smoking Status 97% 21.49 kg/m2 Postmenopausal Former Smoker BMI and BSA Data Body Mass Index Body Surface Area  
 21.49 kg/m 2 1.38 m 2 Preferred Pharmacy Pharmacy Name Phone [facility] [address] [address] [phone] Your Updated Medication List  
  
   
This list is accurate as of: 9/20/17 10:55 AM.  Always use your most recent med list.  
  
  
  
  
 alendronate 70 mg tablet Commonly known as:  FOSAMAX  
take 1 tablet by mouth every week with 2,000 UNITS VITAMIN D TABLET  
  
 aspirin 81 mg tablet Take 81 mg by mouth. atorvastatin 20 mg tablet Commonly known as:  LIPITOR Take 1 Tab by mouth daily. calcium carbonate 600 mg calcium (1,500 mg) tablet Commonly known as:  Mariam Sicard Take 600 mg by mouth two (2) times a day. carvedilol 3.125 mg tablet Commonly known as:  Douglas Hirschfeld Take 1 Tab by mouth two (2) times daily (with meals). CENTRUM SILVER Tab tablet Generic drug:  multivitamins-minerals-lutein Take  by mouth. diclofenac 1 % Gel Commonly known as:  VOLTAREN Apply 4 g to affected area four (4) times daily. esomeprazole 40 mg capsule Commonly known as:  NEXIUM  
take 1 capsule by mouth once daily  
  
 ferrous sulfate, dried 159 mg (45 mg iron) Tber tablet Commonly known as:  SLOW RELEASE IRON Take 1 Tab by mouth daily. guaiFENesin  mg SR tablet Commonly known as:  Giovanni & Giovanni Take 1 Tab by mouth two (2) times a day. lisinopril 10 mg tablet Commonly known as:  Arlyn Garland Take 1 Tab by mouth daily. mometasone 50 mcg/actuation nasal spray Commonly known as:  NASONEX  
2 Sprays by Both Nostrils route daily. oxyCODONE-acetaminophen 5-325 mg per tablet Commonly known as:  PERCOCET Take 1 Tab by mouth every eight (8) hours as needed. Max Daily Amount: 3 Tabs. potassium chloride 20 mEq tablet Commonly known as:  K-DUR, KLOR-CON Take 1 Tab by mouth daily. rOPINIRole 0.5 mg tablet Commonly known as:  REQUIP  
take 1 tablet by mouth twice a day  
  
 spironolactone 25 mg tablet Commonly known as:  ALDACTONE Take 25 mg by mouth daily. * warfarin 6 mg tablet Commonly known as:  COUMADIN  
take 1 tablet by mouth once daily * warfarin 1 mg tablet Commonly known as:  COUMADIN  
TAKE 1 TABLET BY MOUTH ON TUESDAY,THURSDAY,SATURDAY, AND . SKIP DOSE EVERY MONDAY, WEDNESDAY, AND FRIDAY. * Notice: This list has 2 medication(s) that are the same as other medications prescribed for you. Read the directions carefully, and ask your doctor or other care provider to review them with you. Prescriptions Sent to Pharmacy Refills  
 mometasone (NASONEX) 50 mcg/actuation nasal spray 3 Si Sprays by Both Nostrils route daily. Class: Normal  
 Pharmacy: [id number]  #: [phone] Route: Both Nostrils We Performed the Following ADMIN INFLUENZA VIRUS VAC [ HCPCS] AMB POC PT/INR [34888 CPT(R)] INFLUENZA VIRUS VACCINE, HIGH DOSE SEASONAL, PRESERVATIVE FREE [40979 CPT(R)] Follow-up Instructions Return in about 1 month (around 10/20/2017). To-Do List   
 2017 Lab:  CBC WITH AUTOMATED DIFF   
  
 2017 Lab:  METABOLIC PANEL, COMPREHENSIVE Patient Instructions Medicare Wellness Visit, Female The best way to live healthy is to have a healthy lifestyle by eating a well-balanced diet, exercising regularly, limiting alcohol and stopping smoking. Regular physical exams and screening tests are another way to keep healthy. Preventive exams provided by your health care provider can find health problems before they become diseases or illnesses. Preventive services including immunizations, screening tests, monitoring and exams can help you take care of your own health. All people over age 72 should have a pneumovax  and and a prevnar shot to prevent pneumonia.  These are once in a lifetime unless you and your provider decide differently. All people over 65 should have a yearly flu shot and a tetanus vaccine every 10 years. A bone mass density to screen for osteoporosis or thinning of the bones should be done every 2 years after 65. Screening for diabetes mellitus with a blood sugar test should be done every year. Glaucoma is a disease of the eye due to increased ocular pressure that can lead to blindness and it should be done every year by an eye professional. 
 
Cardiovascular screening tests that check for elevated lipids (fatty part of blood) which can lead to heart disease and strokes should be done every 5 years. Colorectal screening that evaluates for blood or polyps in your colon should be done yearly as a stool test or every five years as a flexible sigmoidoscope or every 10 years as a colonoscopy up to age 76. Breast cancer screening with a mammogram is recommended biennially  for women age 54-69. Screening for cervical cancer with a pap smear and pelvic exam is recommended for women after age 72 years every 2 years up to age 79 or when the provider and patient decide to stop. If there is a history of cervical abnormalities or other increased risk for cancer then the test is recommended yearly. Hepatitis C screening is also recommended for anyone born between [birth year] through [address]. A shingles vaccine is also recommended once in a lifetime after age 61. Your Medicare Wellness Exam is recommended annually. Here is a list of your current Health Maintenance items with a due date: There are no preventive care reminders to display for this patient. Introducing [facility]! Christian Celestin introduces [employer] patient portal. Now you can access parts of your medical record, email your doctor's office, and request medication refills online. 1. In your internet browser, go to [email or web address] 2. Click on the First Time User? Click Here link in the Sign In box. You will see the New Member Sign Up page. 3. Enter your [employer] Access Code exactly as it appears below. You will not need to use this code after youve completed the sign-up process. If you do not sign up before the expiration date, you must request a new code. · [employer] Access Code: [email or web address] Expires: 12/19/2017 10:47 AM 
 
4. Enter the last four digits of your Social Security Number (xxxx) and Date of Birth (mm/dd/yyyy) as indicated and click Submit. You will be taken to the next sign-up page. 5. Create a [employer] ID. This will be your [employer] login ID and cannot be changed, so think of one that is secure and easy to remember. 6. Create a [employer] password. You can change your password at any time. 7. Enter your Password Reset Question and Answer. This can be used at a later time if you forget your password. 8. Enter your e-mail address. You will receive e-mail notification when new information is available in [address] 9. Click Sign Up. You can now view and download portions of your medical record. 10. Click the Download Summary menu link to download a portable copy of your medical information. If you have questions, please visit the Frequently Asked Questions section of the [employer] website. Remember, [employer] is NOT to be used for urgent needs. For medical emergencies, dial 911. Now available from your iPhone and Android! Please provide this summary of care documentation to your next provider. Your primary care clinician is listed as [facility]. If you have any questions after today's visit, please call [phone].

## 2017-09-20 NOTE — PROGRESS NOTES
Kei Mayen is a 80 y.o. female who presents today for annual wellness exam. Pain scale 0/10    Health Maintenance Due   Topic Date Due    INFLUENZA AGE 9 TO ADULT  08/01/2017    MEDICARE YEARLY EXAM  08/20/2017           1. Have you been to the ER, urgent care clinic since your last visit? Hospitalized since your last visit? No    2. Have you seen or consulted any other health care providers outside of the [address] [place] [address] since your last visit? Include any pap smears or colon screening. No    Learning Assessment 3/6/2015   PRIMARY LEARNER Patient   HIGHEST LEVEL OF EDUCATION - PRIMARY LEARNER  DID NOT GRADUATE HIGH SCHOOL   BARRIERS PRIMARY LEARNER NONE   CO-LEARNER CAREGIVER No   PRIMARY LANGUAGE ENGLISH    NEED No   LEARNER PREFERENCE PRIMARY DEMONSTRATION   ANSWERED BY Patient   RELATIONSHIP SELF       Abuse Screening Questionnaire 10/7/2015   Do you ever feel afraid of your partner? N   Are you in a relationship with someone who physically or mentally threatens you? N   Is it safe for you to go home?  Ashley Barrett

## 2017-09-21 LAB
ALBUMIN SERPL-MCNC: 4 G/DL (ref 3.5–4.7)
ALBUMIN/GLOB SERPL: 1.5 {RATIO} (ref 1.2–2.2)
ALP SERPL-CCNC: 57 IU/L (ref 39–117)
ALT SERPL-CCNC: 26 IU/L (ref 0–32)
AST SERPL-CCNC: 20 IU/L (ref 0–40)
BASOPHILS # BLD AUTO: 0 X10E3/UL (ref 0–0.2)
BASOPHILS NFR BLD AUTO: 0 %
BILIRUB SERPL-MCNC: 0.3 MG/DL (ref 0–1.2)
BUN SERPL-MCNC: 36 MG/DL (ref 8–27)
BUN/CREAT SERPL: 27 (ref 12–28)
CALCIUM SERPL-MCNC: 9.9 MG/DL (ref 8.7–10.3)
CHLORIDE SERPL-SCNC: 105 MMOL/L (ref 96–106)
CO2 SERPL-SCNC: 20 MMOL/L (ref 18–29)
CREAT SERPL-MCNC: 1.32 MG/DL (ref 0.57–1)
EOSINOPHIL # BLD AUTO: 0.1 X10E3/UL (ref 0–0.4)
EOSINOPHIL NFR BLD AUTO: 2 %
ERYTHROCYTE [DISTWIDTH] IN BLOOD BY AUTOMATED COUNT: 15.2 % (ref 12.3–15.4)
GLOBULIN SER CALC-MCNC: 2.6 G/DL (ref 1.5–4.5)
GLUCOSE SERPL-MCNC: 127 MG/DL (ref 65–99)
HCT VFR BLD AUTO: 31.3 % (ref 34–46.6)
HGB BLD-MCNC: 10.4 G/DL (ref 11.1–15.9)
IMM GRANULOCYTES # BLD: 0 X10E3/UL (ref 0–0.1)
IMM GRANULOCYTES NFR BLD: 0 %
INTERPRETATION: NORMAL
LYMPHOCYTES # BLD AUTO: 0.7 X10E3/UL (ref 0.7–3.1)
LYMPHOCYTES NFR BLD AUTO: 14 %
MCH RBC QN AUTO: 32.9 PG (ref 26.6–33)
MCHC RBC AUTO-ENTMCNC: 33.2 G/DL (ref 31.5–35.7)
MCV RBC AUTO: 99 FL (ref 79–97)
MONOCYTES # BLD AUTO: 0.4 X10E3/UL (ref 0.1–0.9)
MONOCYTES NFR BLD AUTO: 8 %
NEUTROPHILS # BLD AUTO: 3.5 X10E3/UL (ref 1.4–7)
NEUTROPHILS NFR BLD AUTO: 76 %
PLATELET # BLD AUTO: 195 X10E3/UL (ref 150–379)
POTASSIUM SERPL-SCNC: 4.5 MMOL/L (ref 3.5–5.2)
PROT SERPL-MCNC: 6.6 G/DL (ref 6–8.5)
RBC # BLD AUTO: 3.16 X10E6/UL (ref 3.77–5.28)
SODIUM SERPL-SCNC: 144 MMOL/L (ref 134–144)
WBC # BLD AUTO: 4.7 X10E3/UL (ref 3.4–10.8)

## 2017-10-03 ENCOUNTER — TELEPHONE (OUTPATIENT)
Dept: FAMILY MEDICINE CLINIC | Age: 82
End: 2017-10-03

## 2017-10-03 NOTE — TELEPHONE ENCOUNTER
In regards to pt's warfarin  Received notice from insurance that patient is exceeding accumulation limit. Needing a call back prior to filling.

## 2017-10-04 NOTE — TELEPHONE ENCOUNTER
[facility] called about patients warfarin , it is flagging in their system as exceeding the Accumulation limit. They are asking for clarification as to which dose the patient should be taking. Stated that patient has never  the 1mg tablet Rx.   Only the 6mg    Please advise

## 2017-10-20 ENCOUNTER — HOSPITAL ENCOUNTER (OUTPATIENT)
Dept: LAB | Age: 82
Discharge: HOME | End: 2017-10-20

## 2017-10-20 ENCOUNTER — OFFICE VISIT (OUTPATIENT)
Dept: FAMILY MEDICINE CLINIC | Age: 82
End: 2017-10-20

## 2017-10-20 VITALS
HEART RATE: 57 BPM | OXYGEN SATURATION: 100 % | RESPIRATION RATE: 18 BRPM | TEMPERATURE: 97.5 F | SYSTOLIC BLOOD PRESSURE: 151 MMHG | BODY MASS INDEX: 22.37 KG/M2 | HEIGHT: 58 IN | DIASTOLIC BLOOD PRESSURE: 57 MMHG | WEIGHT: 106.6 LBS

## 2017-10-20 DIAGNOSIS — D64.9 CHRONIC ANEMIA: ICD-10-CM

## 2017-10-20 DIAGNOSIS — I48.20 CHRONIC ATRIAL FIBRILLATION (HCC): Primary | ICD-10-CM

## 2017-10-20 DIAGNOSIS — D46.4 REFRACTORY ANEMIA (HCC): ICD-10-CM

## 2017-10-20 LAB
HGB BLD-MCNC: 8.5 G/DL
INR BLD: 2.5
PT POC: 29.7 SECONDS
VALID INTERNAL CONTROL?: YES

## 2017-10-20 PROCEDURE — 99001 SPECIMEN HANDLING PT-LAB: CPT | Performed by: INTERNAL MEDICINE

## 2017-10-20 NOTE — PROGRESS NOTES
Keith Issa is a 80 y.o. female  Chief Complaint   Patient presents with    Coagulation disorder    Medication Evaluation     1. Have you been to the ER, urgent care clinic or hospitalized since your last visit? NO.     2. Have you seen or consulted any other health care providers outside of the [address] [place] [address] since your last visit (Include any pap smears or colon screening)? NO      Do you have an Advanced Directive? NO    Would you like information on Advanced Directives?  NO

## 2017-10-20 NOTE — PROGRESS NOTES
HISTORY OF PRESENT ILLNESS  Megan Waite is a 80 y.o. female. HPI  A fib stable  Anemia stable on iron  Review of Systems   Constitutional: Positive for malaise/fatigue. All other systems reviewed and are negative. Past Medical History:   Diagnosis Date    Congestive heart failure ([address]     Heart valve replaced     Hypercholesterolemia     Hypertension      Current Outpatient Prescriptions on File Prior to Visit   Medication Sig Dispense Refill    mometasone (NASONEX) 50 mcg/actuation nasal spray 2 Sprays by Both Nostrils route daily. 3 Container 3    carvedilol (COREG) 3.125 mg tablet Take 1 Tab by mouth two (2) times daily (with meals). 60 Tab 3    rOPINIRole (REQUIP) 0.5 mg tablet take 1 tablet by mouth twice a day 180 Tab 3    spironolactone (ALDACTONE) 25 mg tablet Take 25 mg by mouth daily.  alendronate (FOSAMAX) 70 mg tablet take 1 tablet by mouth every week with 2,000 UNITS VITAMIN D TABLET 12 Tab 3    diclofenac (VOLTAREN) 1 % gel Apply 4 g to affected area four (4) times daily. 500 g 3    atorvastatin (LIPITOR) 20 mg tablet Take 1 Tab by mouth daily. 90 Tab 3    lisinopril (PRINIVIL, ZESTRIL) 10 mg tablet Take 1 Tab by mouth daily. (Patient taking differently: Take 20 mg by mouth daily.) 90 Tab 1    esomeprazole (NEXIUM) 40 mg capsule take 1 capsule by mouth once daily 90 Cap 1    potassium chloride (K-DUR, KLOR-CON) 20 mEq tablet Take 1 Tab by mouth daily. 90 Tab 1    warfarin (COUMADIN) 6 mg tablet take 1 tablet by mouth once daily 90 Tab 3    ferrous sulfate, dried (SLOW RELEASE IRON) 159 mg (45 mg iron) TbER tablet Take 1 Tab by mouth daily. 30 Tab 0    guaiFENesin SR (MUCINEX) 600 mg SR tablet Take 1 Tab by mouth two (2) times a day. 20 Tab 0    aspirin 81 mg tablet Take 81 mg by mouth.  multivitamins-minerals-lutein (CENTRUM SILVER) Tab Take  by mouth.  calcium carbonate (CALTREX) 600 mg (1,500 mg) tablet Take 600 mg by mouth two (2) times a day.         warfarin (COUMADIN) 1 mg tablet TAKE 1 TABLET BY MOUTH ON TUESDAY,THURSDAY,SATURDAY, AND SUNDAY. SKIP DOSE EVERY MONDAY, WEDNESDAY, AND FRIDAY. 100 Tab 3    oxyCODONE-acetaminophen (PERCOCET) 5-325 mg per tablet Take 1 Tab by mouth every eight (8) hours as needed. Max Daily Amount: 3 Tabs. 10 Tab 0     No current facility-administered medications on file prior to visit. Visit Vitals    /57 (BP 1 Location: Right arm, BP Patient Position: Sitting)    Pulse (!) 57    Temp 97.5 °F (36.4 °C) (Oral)    Resp 18    Ht 4' 10\" (1.473 m)    Wt 106 lb 9.6 oz (48.4 kg)    SpO2 100%    BMI 22.28 kg/m2       Physical Exam   Constitutional: She appears well-developed and well-nourished. No distress. Cardiovascular: Normal rate, normal heart sounds and intact distal pulses. Exam reveals no gallop and no friction rub. No murmur heard. irregular   Pulmonary/Chest: No respiratory distress. She has no wheezes. She has no rales. She exhibits no tenderness. Skin: She is not diaphoretic. Vitals reviewed.    inr 2.5  hgb 8.5    ASSESSMENT and PLAN  a fib   Anemia  Plan   Refer to gi

## 2017-10-20 NOTE — MR AVS SNAPSHOT
Visit Information Date & Time Provider Department Dept. Phone Encounter #  
 10/20/2017 11:00 AM Nadia Young MD [address] [place] [address] [phone] [id number] Follow-up Instructions Return in about 1 month (around 11/20/2017). Follow-up and Disposition History Upcoming Health Maintenance Date Due  
 MEDICARE YEARLY EXAM 9/21/2018 GLAUCOMA SCREENING Q2Y 8/10/2019 DTaP/Tdap/Td series (2 - Td) 9/19/2026 Allergies as of 10/20/2017  Review Complete On: 10/20/2017 By: Nadia Young MD  
 No Known Allergies Current Immunizations  Reviewed on 8/12/2015 Name Date Influenza High Dose Vaccine PF 9/20/2017 11:01 AM, 9/19/2016 12:12 PM  
 Influenza Vaccine 10/24/2014 11:37 AM, 11/12/2013 11:04 AM, 7/1/2008 Influenza Vaccine PF 9/5/2016 Pneumococcal Conjugate (PCV-13) 9/19/2016 12:13 PM  
 Pneumococcal Polysaccharide (PPSV-23) 9/5/2016, 11/26/2014 10:15 AM, 7/1/2008 Tdap 9/19/2016 12:12 PM, 8/12/2015 11:18 AM  
  
 Not reviewed this visit You Were Diagnosed With   
  
 Codes Comments Chronic atrial fibrillation (HCC)    -  Primary ICD-10-CM: J86.6 ICD-9-CM: 427.31 Chronic anemia     ICD-10-CM: D64.9 ICD-9-CM: 666. 9 Vitals BP Pulse Temp Resp Height(growth percentile) Weight(growth percentile) 151/57 (BP 1 Location: Right arm, BP Patient Position: Sitting) (!) 57 97.5 °F (36.4 °C) (Oral) 18 4' 10\" (1.473 m) 106 lb 9.6 oz (48.4 kg) SpO2 BMI OB Status Smoking Status 100% 22.28 kg/m2 Postmenopausal Former Smoker Vitals History BMI and BSA Data Body Mass Index Body Surface Area  
 22.28 kg/m 2 1.41 m 2 Preferred Pharmacy Pharmacy Name Phone [id number] [address] [address] [phone] Your Updated Medication List  
  
   
This list is accurate as of: 10/20/17 12:11 PM.  Always use your most recent med list.  
  
  
  
  
 alendronate 70 mg tablet Commonly known as:  FOSAMAX  
take 1 tablet by mouth every week with 2,000 UNITS VITAMIN D TABLET  
  
 aspirin 81 mg tablet Take 81 mg by mouth. atorvastatin 20 mg tablet Commonly known as:  LIPITOR Take 1 Tab by mouth daily. calcium carbonate 600 mg calcium (1,500 mg) tablet Commonly known as:  [place] Luis Alfredo Take 600 mg by mouth two (2) times a day. carvedilol 3.125 mg tablet Commonly known as:  Dareen Kaska Take 1 Tab by mouth two (2) times daily (with meals). CENTRUM SILVER Tab tablet Generic drug:  multivitamins-minerals-lutein Take  by mouth. diclofenac 1 % Gel Commonly known as:  VOLTAREN Apply 4 g to affected area four (4) times daily. esomeprazole 40 mg capsule Commonly known as:  NEXIUM  
take 1 capsule by mouth once daily  
  
 ferrous sulfate, dried 159 mg (45 mg iron) Tber tablet Commonly known as:  SLOW RELEASE IRON Take 1 Tab by mouth daily. guaiFENesin  mg SR tablet Commonly known as:  Giovanni & Giovanni Take 1 Tab by mouth two (2) times a day. lisinopril 10 mg tablet Commonly known as:  Dilcia Primmer Take 1 Tab by mouth daily. mometasone 50 mcg/actuation nasal spray Commonly known as:  NASONEX  
2 Sprays by Both Nostrils route daily. oxyCODONE-acetaminophen 5-325 mg per tablet Commonly known as:  PERCOCET Take 1 Tab by mouth every eight (8) hours as needed. Max Daily Amount: 3 Tabs. potassium chloride 20 mEq tablet Commonly known as:  K-DUR, KLOR-CON Take 1 Tab by mouth daily. rOPINIRole 0.5 mg tablet Commonly known as:  REQUIP  
take 1 tablet by mouth twice a day  
  
 spironolactone 25 mg tablet Commonly known as:  ALDACTONE Take 25 mg by mouth daily. * warfarin 6 mg tablet Commonly known as:  COUMADIN  
take 1 tablet by mouth once daily * warfarin 1 mg tablet Commonly known as:  COUMADIN  
 TAKE 1 TABLET BY MOUTH ON TUESDAY,THURSDAY,SATURDAY, AND SUNDAY. SKIP DOSE EVERY MONDAY, WEDNESDAY, AND FRIDAY. * Notice: This list has 2 medication(s) that are the same as other medications prescribed for you. Read the directions carefully, and ask your doctor or other care provider to review them with you. We Performed the Following AMB POC HEMOGLOBIN (HGB) [26967 CPT(R)] AMB POC PT/INR [20135 CPT(R)] Follow-up Instructions Return in about 1 month (around 11/20/2017). Introducing [facility]! [facility] introduces [employer] patient portal. Now you can access parts of your medical record, email your doctor's office, and request medication refills online. 1. In your internet browser, go to [email or web address] 2. Click on the First Time User? Click Here link in the Sign In box. You will see the New Member Sign Up page. 3. Enter your [employer] Access Code exactly as it appears below. You will not need to use this code after youve completed the sign-up process. If you do not sign up before the expiration date, you must request a new code. · [employer] Access Code: [email or web address] Expires: 12/19/2017 10:47 AM 
 
4. Enter the last four digits of your Social Security Number (xxxx) and Date of Birth (mm/dd/yyyy) as indicated and click Submit. You will be taken to the next sign-up page. 5. Create a [employer] ID. This will be your [employer] login ID and cannot be changed, so think of one that is secure and easy to remember. 6. Create a [employer] password. You can change your password at any time. 7. Enter your Password Reset Question and Answer. This can be used at a later time if you forget your password. 8. Enter your e-mail address. You will receive e-mail notification when new information is available in [address] 9. Click Sign Up. You can now view and download portions of your medical record. 10. Click the Download Summary menu link to download a portable copy of your medical information. If you have questions, please visit the Frequently Asked Questions section of the [employer] website. Remember, [employer] [facility] is NOT to be used for urgent needs. For medical emergencies, dial 911. Now available from your iPhone and Android! Please provide this summary of care documentation to your next provider. Your primary care clinician is listed as Jose Contreras. If you have any questions after today's visit, please call [phone].

## 2017-10-21 LAB
FERRITIN SERPL-MCNC: 274 NG/ML (ref 15–150)
FOLATE SERPL-MCNC: 19.5 NG/ML
IRON SATN MFR SERPL: 26 % (ref 15–55)
IRON SERPL-MCNC: 63 UG/DL (ref 27–139)
RETICS/RBC NFR AUTO: 1 % (ref 0.6–2.6)
TIBC SERPL-MCNC: 245 UG/DL (ref 250–450)
UIBC SERPL-MCNC: 182 UG/DL (ref 118–369)
VIT B12 SERPL-MCNC: 679 PG/ML (ref 211–946)

## 2017-11-27 ENCOUNTER — OFFICE VISIT (OUTPATIENT)
Dept: FAMILY MEDICINE CLINIC | Age: 82
End: 2017-11-27

## 2017-11-27 VITALS
HEART RATE: 57 BPM | TEMPERATURE: 97 F | DIASTOLIC BLOOD PRESSURE: 60 MMHG | SYSTOLIC BLOOD PRESSURE: 160 MMHG | OXYGEN SATURATION: 99 % | HEIGHT: 58 IN | RESPIRATION RATE: 16 BRPM | BODY MASS INDEX: 21.83 KG/M2 | WEIGHT: 104 LBS

## 2017-11-27 DIAGNOSIS — Z95.2 H/O PROSTHETIC HEART VALVE: Primary | ICD-10-CM

## 2017-11-27 DIAGNOSIS — M15.9 PRIMARY OSTEOARTHRITIS INVOLVING MULTIPLE JOINTS: ICD-10-CM

## 2017-11-27 LAB
INR BLD: 3
PT POC: NORMAL SECONDS
VALID INTERNAL CONTROL?: YES

## 2017-11-27 RX ORDER — TIZANIDINE 2 MG/1
TABLET ORAL
Qty: 40 TAB | Refills: 1 | Status: SHIPPED | OUTPATIENT
Start: 2017-11-27 | End: 2017-12-20 | Stop reason: ALTCHOICE

## 2017-11-27 NOTE — PROGRESS NOTES
HISTORY OF PRESENT ILLNESS  Brittany Ratliff is a 80 y.o. female. HPI  Prosthetic heart valve stable  C/o arthritis pain  Review of Systems   Musculoskeletal: Positive for joint pain. All other systems reviewed and are negative. Past Medical History:   Diagnosis Date    Congestive heart failure ([address]     Heart valve replaced     Hypercholesterolemia     Hypertension      Current Outpatient Prescriptions on File Prior to Visit   Medication Sig Dispense Refill    mometasone (NASONEX) 50 mcg/actuation nasal spray 2 Sprays by Both Nostrils route daily. 3 Container 3    carvedilol (COREG) 3.125 mg tablet Take 1 Tab by mouth two (2) times daily (with meals). 60 Tab 3    rOPINIRole (REQUIP) 0.5 mg tablet take 1 tablet by mouth twice a day 180 Tab 3    spironolactone (ALDACTONE) 25 mg tablet Take 25 mg by mouth daily.  warfarin (COUMADIN) 1 mg tablet TAKE 1 TABLET BY MOUTH ON TUESDAY,THURSDAY,SATURDAY, AND SUNDAY. SKIP DOSE EVERY MONDAY, WEDNESDAY, AND FRIDAY. 100 Tab 3    alendronate (FOSAMAX) 70 mg tablet take 1 tablet by mouth every week with 2,000 UNITS VITAMIN D TABLET 12 Tab 3    diclofenac (VOLTAREN) 1 % gel Apply 4 g to affected area four (4) times daily. 500 g 3    atorvastatin (LIPITOR) 20 mg tablet Take 1 Tab by mouth daily. 90 Tab 3    lisinopril (PRINIVIL, ZESTRIL) 10 mg tablet Take 1 Tab by mouth daily. (Patient taking differently: Take 20 mg by mouth daily.) 90 Tab 1    esomeprazole (NEXIUM) 40 mg capsule take 1 capsule by mouth once daily 90 Cap 1    oxyCODONE-acetaminophen (PERCOCET) 5-325 mg per tablet Take 1 Tab by mouth every eight (8) hours as needed. Max Daily Amount: 3 Tabs. 10 Tab 0    potassium chloride (K-DUR, KLOR-CON) 20 mEq tablet Take 1 Tab by mouth daily. 90 Tab 1    warfarin (COUMADIN) 6 mg tablet take 1 tablet by mouth once daily 90 Tab 3    ferrous sulfate, dried (SLOW RELEASE IRON) 159 mg (45 mg iron) TbER tablet Take 1 Tab by mouth daily.  30 Tab 0    guaiFENesin SR (MUCINEX) 600 mg SR tablet Take 1 Tab by mouth two (2) times a day. 20 Tab 0    aspirin 81 mg tablet Take 81 mg by mouth.  multivitamins-minerals-lutein (CENTRUM SILVER) Tab Take  by mouth.  calcium carbonate (CALTREX) 600 mg (1,500 mg) tablet Take 600 mg by mouth two (2) times a day. No current facility-administered medications on file prior to visit. Visit Vitals    /60 (BP 1 Location: Right arm, BP Patient Position: Sitting)    Pulse (!) 57    Temp 97 °F (36.1 °C) (Oral)    Resp 16    Ht 4' 10\" (1.473 m)    Wt 104 lb (47.2 kg)    SpO2 99%    BMI 21.74 kg/m2         Physical Exam   Constitutional: She appears well-developed and well-nourished. No distress. Musculoskeletal: Normal range of motion. She exhibits tenderness. She exhibits no edema or deformity. Skin: She is not diaphoretic. Vitals reviewed.     inr 3.0  ASSESSMENT and PLAN  prosthetic valve   osteoarthritis multiple joints  Plan  Continue rx  Recheck in 1 month

## 2017-11-27 NOTE — MR AVS SNAPSHOT
Visit Information Date & Time Provider Department Dept. Phone Encounter #  
 11/27/2017  1:45 PM Katya Yañez MD 3 [facility] [phone] [id number] Follow-up Instructions Return in about 1 month (around 12/27/2017). Follow-up and Disposition History Upcoming Health Maintenance Date Due  
 MEDICARE YEARLY EXAM 9/21/2018 GLAUCOMA SCREENING Q2Y 8/10/2019 DTaP/Tdap/Td series (2 - Td) 9/19/2026 Allergies as of 11/27/2017  Review Complete On: 11/27/2017 By: Katya Yañez MD  
 No Known Allergies Current Immunizations  Reviewed on 8/12/2015 Name Date Influenza High Dose Vaccine PF 9/20/2017 11:01 AM, 9/19/2016 12:12 PM  
 Influenza Vaccine 10/24/2014 11:37 AM, 11/12/2013 11:04 AM, 7/1/2008 Influenza Vaccine PF 9/5/2016 Pneumococcal Conjugate (PCV-13) 9/19/2016 12:13 PM  
 Pneumococcal Polysaccharide (PPSV-23) 9/5/2016, 11/26/2014 10:15 AM, 7/1/2008 Tdap 9/19/2016 12:12 PM, 8/12/2015 11:18 AM  
  
 Not reviewed this visit You Were Diagnosed With   
  
 Codes Comments H/O prosthetic heart valve    -  Primary ICD-10-CM: Z95.2 ICD-9-CM: V43.3 Primary osteoarthritis involving multiple joints     ICD-10-CM: M15.0 ICD-9-CM: 715.09 Vitals BP Pulse Temp Resp Height(growth percentile) Weight(growth percentile) 160/60 (BP 1 Location: Right arm, BP Patient Position: Sitting) (!) 57 97 °F (36.1 °C) (Oral) 16 4' 10\" (1.473 m) 104 lb (47.2 kg) SpO2 BMI OB Status Smoking Status 99% 21.74 kg/m2 Postmenopausal Former Smoker Vitals History BMI and BSA Data Body Mass Index Body Surface Area 21.74 kg/m 2 1.39 m 2 Preferred Pharmacy Pharmacy Name Phone [id number] [address] [id number] [address] [phone] Your Updated Medication List  
  
   
This list is accurate as of: 11/27/17  2:05 PM.  Always use your most recent med list.  
  
  
  
  
 alendronate 70 mg tablet Commonly known as:  FOSAMAX  
take 1 tablet by mouth every week with 2,000 UNITS VITAMIN D TABLET  
  
 aspirin 81 mg tablet Take 81 mg by mouth. atorvastatin 20 mg tablet Commonly known as:  LIPITOR Take 1 Tab by mouth daily. calcium carbonate 600 mg calcium (1,500 mg) tablet Commonly known as:  Marianne Salle Take 600 mg by mouth two (2) times a day. carvedilol 3.125 mg tablet Commonly known as:  Sharolyn Hurt Take 1 Tab by mouth two (2) times daily (with meals). CENTRUM SILVER Tab tablet Generic drug:  multivitamins-minerals-lutein Take  by mouth. diclofenac 1 % Gel Commonly known as:  VOLTAREN Apply 4 g to affected area four (4) times daily. esomeprazole 40 mg capsule Commonly known as:  NEXIUM  
take 1 capsule by mouth once daily  
  
 ferrous sulfate, dried 159 mg (45 mg iron) Tber tablet Commonly known as:  SLOW RELEASE IRON Take 1 Tab by mouth daily. guaiFENesin  mg SR tablet Commonly known as:  Giovanni & Giovanni Take 1 Tab by mouth two (2) times a day. lisinopril 10 mg tablet Commonly known as:  Ora Bee Take 1 Tab by mouth daily. mometasone 50 mcg/actuation nasal spray Commonly known as:  NASONEX  
2 Sprays by Both Nostrils route daily. oxyCODONE-acetaminophen 5-325 mg per tablet Commonly known as:  PERCOCET Take 1 Tab by mouth every eight (8) hours as needed. Max Daily Amount: 3 Tabs. potassium chloride 20 mEq tablet Commonly known as:  K-DUR, KLOR-CON Take 1 Tab by mouth daily. rOPINIRole 0.5 mg tablet Commonly known as:  REQUIP  
take 1 tablet by mouth twice a day  
  
 spironolactone 25 mg tablet Commonly known as:  ALDACTONE Take 25 mg by mouth daily. tiZANidine 2 mg tablet Commonly known as:  Bob Greenberg Take 1 with 2 regular strength Tylenol every 6 hours as needed for pain * warfarin 6 mg tablet Commonly known as:  COUMADIN  
 take 1 tablet by mouth once daily * warfarin 1 mg tablet Commonly known as:  COUMADIN  
TAKE 1 TABLET BY MOUTH ON TUESDAY,THURSDAY,SATURDAY, AND SUNDAY. SKIP DOSE EVERY MONDAY, WEDNESDAY, AND FRIDAY. * Notice: This list has 2 medication(s) that are the same as other medications prescribed for you. Read the directions carefully, and ask your doctor or other care provider to review them with you. Prescriptions Sent to Pharmacy Refills  
 tiZANidine (ZANAFLEX) 2 mg tablet 1 Sig: Take 1 with 2 regular strength Tylenol every 6 hours as needed for pain  
 Class: Normal  
 Pharmacy: [facility] [place] L[address]pez [id number] [address] [place] [address] Ph #: [phone] We Performed the Following AMB POC PT/INR [77796 CPT(R)] Follow-up Instructions Return in about 1 month (around 12/27/2017). Introducing [facility] & [facility]! Alexei Iniguez introduces [employer] patient portal. Now you can access parts of your medical record, email your doctor's office, and request medication refills online. 1. In your internet browser, go to [email or web address] 2. Click on the First Time User? Click Here link in the Sign In box. You will see the New Member Sign Up page. 3. Enter your [employer] Access Code exactly as it appears below. You will not need to use this code after youve completed the sign-up process. If you do not sign up before the expiration date, you must request a new code. · [employer] Access Code: [email or web address] Expires: 12/19/2017  9:47 AM 
 
4. Enter the last four digits of your Social Security Number (xxxx) and Date of Birth (mm/dd/yyyy) as indicated and click Submit. You will be taken to the next sign-up page. 5. Create a [employer] ID. This will be your [employer] login ID and cannot be changed, so think of one that is secure and easy to remember. 6. Create a [employer] password. You can change your password at any time. 7. Enter your Password Reset Question and Answer. This can be used at a later time if you forget your password. 8. Enter your e-mail address. You will receive e-mail notification when new information is available in [address] 9. Click Sign Up. You can now view and download portions of your medical record. 10. Click the Download Summary menu link to download a portable copy of your medical information. If you have questions, please visit the Frequently Asked Questions section of the [employer] website. Remember, [employer] is NOT to be used for urgent needs. For medical emergencies, dial 911. Now available from your iPhone and Android! Please provide this summary of care documentation to your next provider. Your primary care clinician is listed as Theo Tomlinson. If you have any questions after today's visit, please call [phone].

## 2017-11-27 NOTE — PROGRESS NOTES
Apolonia Mattson is a 80 y.o. female (: 1930) presenting to address:    Chief Complaint   Patient presents with    Coagulation disorder    Arm Pain     RT arm pain         pain scale 2/10       Vitals:    17 1331   BP: 160/60   Pulse: (!) 57   Resp: 16   Temp: 97 °F (36.1 °C)   TempSrc: Oral   SpO2: 99%   Weight: 104 lb (47.2 kg)   Height: 4' 10\" (1.473 m)   PainSc:   2   PainLoc: Arm       Hearing/Vision:   No exam data present    Learning Assessment:     Learning Assessment 3/6/2015   PRIMARY LEARNER Patient   HIGHEST LEVEL OF EDUCATION - PRIMARY LEARNER  DID NOT GRADUATE HIGH SCHOOL   BARRIERS PRIMARY LEARNER NONE   CO-LEARNER CAREGIVER No   PRIMARY LANGUAGE ENGLISH    NEED No   LEARNER PREFERENCE PRIMARY DEMONSTRATION   ANSWERED BY Patient   RELATIONSHIP SELF     Depression Screening:     PHQ over the last two weeks 2017   Little interest or pleasure in doing things Not at all   Feeling down, depressed or hopeless Not at all   Total Score PHQ 2 0     Fall Risk Assessment:     Fall Risk Assessment, last 12 mths 2017   Able to walk? Yes   Fall in past 12 months? No   Fall with injury? -   Number of falls in past 12 months -   Fall Risk Score -     Abuse Screening:     Abuse Screening Questionnaire 10/7/2015   Do you ever feel afraid of your partner? N   Are you in a relationship with someone who physically or mentally threatens you? N   Is it safe for you to go home? Y     Coordination of Care Questionaire:   1. Have you been to the ER, urgent care clinic since your last visit? Hospitalized since your last visit? NO    2. Have you seen or consulted any other health care providers outside of the [address] [place] [address] since your last visit? Include any pap smears or colon screening. NO    Advanced Directive:   1. Do you have an Advanced Directive? NO    2. Would you like information on Advanced Directives?  NO

## 2017-12-04 RX ORDER — WARFARIN 6 MG/1
TABLET ORAL
Qty: 90 TAB | Refills: 3 | Status: SHIPPED | OUTPATIENT
Start: 2017-12-04 | End: 2018-12-04 | Stop reason: SDUPTHER

## 2017-12-20 ENCOUNTER — OFFICE VISIT (OUTPATIENT)
Dept: FAMILY MEDICINE CLINIC | Age: 82
End: 2017-12-20

## 2017-12-20 VITALS
TEMPERATURE: 97.4 F | HEART RATE: 61 BPM | WEIGHT: 102 LBS | HEIGHT: 58 IN | BODY MASS INDEX: 21.41 KG/M2 | RESPIRATION RATE: 18 BRPM | DIASTOLIC BLOOD PRESSURE: 58 MMHG | OXYGEN SATURATION: 99 % | SYSTOLIC BLOOD PRESSURE: 138 MMHG

## 2017-12-20 DIAGNOSIS — M17.0 PRIMARY OSTEOARTHRITIS OF BOTH KNEES: ICD-10-CM

## 2017-12-20 DIAGNOSIS — M81.0 OSTEOPOROSIS, UNSPECIFIED OSTEOPOROSIS TYPE, UNSPECIFIED PATHOLOGICAL FRACTURE PRESENCE: ICD-10-CM

## 2017-12-20 DIAGNOSIS — Z96.659 HISTORY OF TOTAL KNEE REPLACEMENT, UNSPECIFIED LATERALITY: ICD-10-CM

## 2017-12-20 DIAGNOSIS — I50.41 ACUTE COMBINED SYSTOLIC AND DIASTOLIC CONGESTIVE HEART FAILURE (HCC): ICD-10-CM

## 2017-12-20 DIAGNOSIS — D64.9 ANEMIA, UNSPECIFIED TYPE: ICD-10-CM

## 2017-12-20 DIAGNOSIS — I10 ESSENTIAL HYPERTENSION, BENIGN: Chronic | ICD-10-CM

## 2017-12-20 DIAGNOSIS — E78.49 OTHER HYPERLIPIDEMIA: Chronic | ICD-10-CM

## 2017-12-20 DIAGNOSIS — I42.9 CARDIOMYOPATHY, UNSPECIFIED TYPE (HCC): ICD-10-CM

## 2017-12-20 DIAGNOSIS — I48.20 CHRONIC ATRIAL FIBRILLATION (HCC): Primary | ICD-10-CM

## 2017-12-20 DIAGNOSIS — Z95.2 HISTORY OF HEART VALVE REPLACEMENT: ICD-10-CM

## 2017-12-20 LAB
INR BLD: 2.8
PT POC: NORMAL SECONDS
VALID INTERNAL CONTROL?: YES

## 2017-12-20 NOTE — MR AVS SNAPSHOT
Visit Information Date & Time Provider Department Dept. Phone Encounter #  
 12/20/2017 11:30 AM Lin Hernandes, 3 [facility] [phone] [id number] Follow-up Instructions Return in about 1 month (around 1/20/2018). Follow-up and Disposition History Upcoming Health Maintenance Date Due  
 MEDICARE YEARLY EXAM 9/21/2018 GLAUCOMA SCREENING Q2Y 8/10/2019 DTaP/Tdap/Td series (2 - Td) 9/19/2026 Allergies as of 12/20/2017  Review Complete On: 12/20/2017 By: Lin Hernandes MD  
 No Known Allergies Current Immunizations  Reviewed on 8/12/2015 Name Date Influenza High Dose Vaccine PF 9/20/2017 11:01 AM, 9/19/2016 12:12 PM  
 Influenza Vaccine 10/24/2014 11:37 AM, 11/12/2013 11:04 AM, 7/1/2008 Influenza Vaccine PF 9/5/2016 Pneumococcal Conjugate (PCV-13) 9/19/2016 12:13 PM  
 Pneumococcal Polysaccharide (PPSV-23) 9/5/2016, 11/26/2014 10:15 AM, 7/1/2008 Tdap 9/19/2016 12:12 PM, 8/12/2015 11:18 AM  
  
 Not reviewed this visit You Were Diagnosed With   
  
 Codes Comments Chronic atrial fibrillation (HCC)    -  Primary ICD-10-CM: I43.2 ICD-9-CM: 427.31 History of heart valve replacement     ICD-10-CM: Z95.2 ICD-9-CM: V43.3 Anemia, unspecified type     ICD-10-CM: D64.9 ICD-9-CM: 285.9 Primary osteoarthritis of both knees     ICD-10-CM: M17.0 ICD-9-CM: 715.16 Osteoporosis, unspecified osteoporosis type, unspecified pathological fracture presence     ICD-10-CM: M81.0 ICD-9-CM: 733.00 History of total knee replacement, unspecified laterality     ICD-10-CM: K23.161 ICD-9-CM: V43.65 Acute combined systolic and diastolic congestive heart failure (HCC)     ICD-10-CM: I50.41 ICD-9-CM: 428.41, 428.0 Cardiomyopathy, unspecified type ([facility]     ICD-10-CM: I42.9 ICD-9-CM: 425.4 Essential hypertension, benign     ICD-10-CM: I10 
ICD-9-CM: 401.1 Other hyperlipidemia     ICD-10-CM: E78.4 ICD-9-CM: 272.4 Vitals BP Pulse Temp Resp Height(growth percentile) Weight(growth percentile) 138/58 (BP 1 Location: Right arm, BP Patient Position: Sitting) 61 97.4 °F (36.3 °C) (Oral) 18 4' 10\" (1.473 m) 102 lb (46.3 kg) SpO2 BMI OB Status Smoking Status 99% 21.32 kg/m2 Postmenopausal Former Smoker BMI and BSA Data Body Mass Index Body Surface Area  
 21.32 kg/m 2 1.38 m 2 Preferred Pharmacy Pharmacy Name Phone [id number] [address] [address] [address] [place] [address] [phone] Your Updated Medication List  
  
   
This list is accurate as of: 12/20/17 12:10 PM.  Always use your most recent med list.  
  
  
  
  
 alendronate 70 mg tablet Commonly known as:  FOSAMAX  
take 1 tablet by mouth every week with 2,000 UNITS VITAMIN D TABLET  
  
 aspirin 81 mg tablet Take 81 mg by mouth. atorvastatin 20 mg tablet Commonly known as:  LIPITOR Take 1 Tab by mouth daily. calcium carbonate 600 mg calcium (1,500 mg) tablet Commonly known as:  Alia Doom Take 600 mg by mouth two (2) times a day. carvedilol 3.125 mg tablet Commonly known as:  Sade Spry Take 1 Tab by mouth two (2) times daily (with meals). CENTRUM SILVER Tab tablet Generic drug:  multivitamins-minerals-lutein Take  by mouth. diclofenac 1 % Gel Commonly known as:  VOLTAREN Apply 4 g to affected area four (4) times daily. esomeprazole 40 mg capsule Commonly known as:  NEXIUM  
take 1 capsule by mouth once daily  
  
 ferrous sulfate, dried 159 mg (45 mg iron) Tber tablet Commonly known as:  SLOW RELEASE IRON Take 1 Tab by mouth daily. guaiFENesin  mg SR tablet Commonly known as:  Giovanni & Giovanni Take 1 Tab by mouth two (2) times a day. lisinopril 10 mg tablet Commonly known as:  Raymond Paradise Take 1 Tab by mouth daily. mometasone 50 mcg/actuation nasal spray Commonly known as:  Karlo Pelt 2 Sprays by Both Nostrils route daily. potassium chloride 20 mEq tablet Commonly known as:  K-DUR, KLOR-CON Take 1 Tab by mouth daily. rOPINIRole 0.5 mg tablet Commonly known as:  REQUIP  
take 1 tablet by mouth twice a day  
  
 spironolactone 25 mg tablet Commonly known as:  ALDACTONE Take 25 mg by mouth daily. * warfarin 1 mg tablet Commonly known as:  COUMADIN  
TAKE 1 TABLET BY MOUTH ON TUESDAY,THURSDAY,SATURDAY, AND SUNDAY. SKIP DOSE EVERY MONDAY, WEDNESDAY, AND FRIDAY. * warfarin 6 mg tablet Commonly known as:  COUMADIN  
take 1 tablet by mouth once daily * Notice: This list has 2 medication(s) that are the same as other medications prescribed for you. Read the directions carefully, and ask your doctor or other care provider to review them with you. We Performed the Following AMB POC PT/INR [48459 CPT(R)] Follow-up Instructions Return in about 1 month (around 1/20/2018). Introducing [facility]! Juan Jose Castellanos introduces [employer] patient portal. Now you can access parts of your medical record, email your doctor's office, and request medication refills online. 1. In your internet browser, go to [email or web address] 2. Click on the First Time User? Click Here link in the Sign In box. You will see the New Member Sign Up page. 3. Enter your [employer] Access Code exactly as it appears below. You will not need to use this code after youve completed the sign-up process. If you do not sign up before the expiration date, you must request a new code. · [employer] Access Code: [email or web address] Expires: 3/20/2018 12:10 PM 
 
4. Enter the last four digits of your Social Security Number (xxxx) and Date of Birth (mm/dd/yyyy) as indicated and click Submit. You will be taken to the next sign-up page. 5. Create a [employer] ID.  This will be your [employer] login ID and cannot be changed, so think of one that is secure and easy to remember. 6. Create a [employer] password. You can change your password at any time. 7. Enter your Password Reset Question and Answer. This can be used at a later time if you forget your password. 8. Enter your e-mail address. You will receive e-mail notification when new information is available in [address] 9. Click Sign Up. You can now view and download portions of your medical record. 10. Click the Download Summary menu link to download a portable copy of your medical information. If you have questions, please visit the Frequently Asked Questions section of the [employer] website. Remember, [employer] is NOT to be used for urgent needs. For medical emergencies, dial 911. Now available from your iPhone and Android! Please provide this summary of care documentation to your next provider. Your primary care clinician is listed as Domonique Israel. If you have any questions after today's visit, please call [phone].

## 2017-12-20 NOTE — PROGRESS NOTES
HISTORY OF PRESENT ILLNESS  Dago Vega is a 80 y.o. female. HPI  A fib stable  hbp stable  Review of Systems   All other systems reviewed and are negative. Past Medical History:   Diagnosis Date    Congestive heart failure ([address]     Heart valve replaced     Hypercholesterolemia     Hypertension      Current Outpatient Prescriptions on File Prior to Visit   Medication Sig Dispense Refill    warfarin (COUMADIN) 6 mg tablet take 1 tablet by mouth once daily 90 Tab 3    mometasone (NASONEX) 50 mcg/actuation nasal spray 2 Sprays by Both Nostrils route daily. 3 Container 3    carvedilol (COREG) 3.125 mg tablet Take 1 Tab by mouth two (2) times daily (with meals). 60 Tab 3    rOPINIRole (REQUIP) 0.5 mg tablet take 1 tablet by mouth twice a day 180 Tab 3    spironolactone (ALDACTONE) 25 mg tablet Take 25 mg by mouth daily.  warfarin (COUMADIN) 1 mg tablet TAKE 1 TABLET BY MOUTH ON TUESDAY,THURSDAY,SATURDAY, AND SUNDAY. SKIP DOSE EVERY MONDAY, WEDNESDAY, AND FRIDAY. 100 Tab 3    alendronate (FOSAMAX) 70 mg tablet take 1 tablet by mouth every week with 2,000 UNITS VITAMIN D TABLET 12 Tab 3    diclofenac (VOLTAREN) 1 % gel Apply 4 g to affected area four (4) times daily. 500 g 3    atorvastatin (LIPITOR) 20 mg tablet Take 1 Tab by mouth daily. 90 Tab 3    lisinopril (PRINIVIL, ZESTRIL) 10 mg tablet Take 1 Tab by mouth daily. (Patient taking differently: Take 20 mg by mouth daily.) 90 Tab 1    esomeprazole (NEXIUM) 40 mg capsule take 1 capsule by mouth once daily 90 Cap 1    potassium chloride (K-DUR, KLOR-CON) 20 mEq tablet Take 1 Tab by mouth daily. 90 Tab 1    ferrous sulfate, dried (SLOW RELEASE IRON) 159 mg (45 mg iron) TbER tablet Take 1 Tab by mouth daily. 30 Tab 0    guaiFENesin SR (MUCINEX) 600 mg SR tablet Take 1 Tab by mouth two (2) times a day. 20 Tab 0    aspirin 81 mg tablet Take 81 mg by mouth.  multivitamins-minerals-lutein (CENTRUM SILVER) Tab Take  by mouth.         calcium carbonate (CALTREX) 600 mg (1,500 mg) tablet Take 600 mg by mouth two (2) times a day. No current facility-administered medications on file prior to visit. Visit Vitals    /58 (BP 1 Location: Right arm, BP Patient Position: Sitting)    Pulse 61    Temp 97.4 °F (36.3 °C) (Oral)    Resp 18    Ht 4' 10\" (1.473 m)    Wt 102 lb (46.3 kg)    SpO2 99%    BMI 21.32 kg/m2         Physical Exam   Constitutional: She appears well-developed and well-nourished. No distress. Cardiovascular: Normal rate, regular rhythm and intact distal pulses. Exam reveals no gallop and no friction rub. Murmur heard. + click   Skin: She is not diaphoretic. Vitals reviewed.   inr 2.8      ASSESSMENT and PLAN  a fib   Plan  Continue current dose  Recheck in 1 month

## 2017-12-20 NOTE — PROGRESS NOTES
Myah Garza is a 80 y.o. female (: 1930) presenting to address:    Chief Complaint   Patient presents with    Coagulation disorder     pain scale 0/10       Vitals:    17 1139   BP: 138/58   Pulse: 61   Resp: 18   Temp: 97.4 °F (36.3 °C)   TempSrc: Oral   SpO2: 99%   Weight: 102 lb (46.3 kg)   Height: 4' 10\" (1.473 m)   PainSc:   0 - No pain       Hearing/Vision:   No exam data present    Learning Assessment:     Learning Assessment 3/6/2015   PRIMARY LEARNER Patient   HIGHEST LEVEL OF EDUCATION - PRIMARY LEARNER  DID NOT GRADUATE HIGH SCHOOL   BARRIERS PRIMARY LEARNER NONE   CO-LEARNER CAREGIVER No   PRIMARY LANGUAGE ENGLISH    NEED No   LEARNER PREFERENCE PRIMARY DEMONSTRATION   ANSWERED BY Patient   RELATIONSHIP SELF     Depression Screening:     PHQ over the last two weeks 2017   Little interest or pleasure in doing things Not at all   Feeling down, depressed or hopeless Not at all   Total Score PHQ 2 0     Fall Risk Assessment:     Fall Risk Assessment, last 12 mths 2017   Able to walk? Yes   Fall in past 12 months? No   Fall with injury? -   Number of falls in past 12 months -   Fall Risk Score -     Abuse Screening:     Abuse Screening Questionnaire 10/7/2015   Do you ever feel afraid of your partner? N   Are you in a relationship with someone who physically or mentally threatens you? N   Is it safe for you to go home? Y     Coordination of Care Questionaire:   1. Have you been to the ER, urgent care clinic since your last visit? Hospitalized since your last visit? NO    2. Have you seen or consulted any other health care providers outside of the [address] [place] [address] since your last visit? Include any pap smears or colon screening. NO    Advanced Directive:   1. Do you have an Advanced Directive? NO    2. Would you like information on Advanced Directives?  NO

## 2018-01-19 ENCOUNTER — OFFICE VISIT (OUTPATIENT)
Dept: FAMILY MEDICINE CLINIC | Age: 83
End: 2018-01-19

## 2018-01-19 ENCOUNTER — HOSPITAL ENCOUNTER (OUTPATIENT)
Dept: LAB | Age: 83
Discharge: HOME | End: 2018-01-19

## 2018-01-19 VITALS
WEIGHT: 101 LBS | RESPIRATION RATE: 16 BRPM | HEIGHT: 58 IN | OXYGEN SATURATION: 98 % | DIASTOLIC BLOOD PRESSURE: 65 MMHG | HEART RATE: 61 BPM | SYSTOLIC BLOOD PRESSURE: 172 MMHG | TEMPERATURE: 60 F | BODY MASS INDEX: 21.2 KG/M2

## 2018-01-19 DIAGNOSIS — D64.9 ANEMIA, UNSPECIFIED TYPE: ICD-10-CM

## 2018-01-19 DIAGNOSIS — E55.9 VITAMIN D DEFICIENCY: ICD-10-CM

## 2018-01-19 DIAGNOSIS — I48.20 CHRONIC ATRIAL FIBRILLATION (HCC): ICD-10-CM

## 2018-01-19 DIAGNOSIS — G56.03 BILATERAL CARPAL TUNNEL SYNDROME: ICD-10-CM

## 2018-01-19 DIAGNOSIS — Z95.2 HISTORY OF HEART VALVE REPLACEMENT: ICD-10-CM

## 2018-01-19 DIAGNOSIS — M17.0 PRIMARY OSTEOARTHRITIS OF BOTH KNEES: ICD-10-CM

## 2018-01-19 DIAGNOSIS — Z96.659 HISTORY OF TOTAL KNEE REPLACEMENT, UNSPECIFIED LATERALITY: ICD-10-CM

## 2018-01-19 DIAGNOSIS — M81.0 OSTEOPOROSIS, UNSPECIFIED OSTEOPOROSIS TYPE, UNSPECIFIED PATHOLOGICAL FRACTURE PRESENCE: ICD-10-CM

## 2018-01-19 DIAGNOSIS — I50.41 ACUTE COMBINED SYSTOLIC AND DIASTOLIC CONGESTIVE HEART FAILURE (HCC): ICD-10-CM

## 2018-01-19 DIAGNOSIS — I42.9 CARDIOMYOPATHY, UNSPECIFIED TYPE (HCC): ICD-10-CM

## 2018-01-19 DIAGNOSIS — R63.4 UNEXPLAINED WEIGHT LOSS: Primary | ICD-10-CM

## 2018-01-19 DIAGNOSIS — E78.5 HYPERLIPIDEMIA, UNSPECIFIED HYPERLIPIDEMIA TYPE: ICD-10-CM

## 2018-01-19 LAB
INR BLD: 1.8
PT POC: NORMAL SECONDS
VALID INTERNAL CONTROL?: YES

## 2018-01-19 PROCEDURE — 99001 SPECIMEN HANDLING PT-LAB: CPT | Performed by: INTERNAL MEDICINE

## 2018-01-19 RX ORDER — DOXEPIN HYDROCHLORIDE 10 MG/1
10 CAPSULE ORAL
Qty: 30 CAP | Refills: 1 | Status: SHIPPED | OUTPATIENT
Start: 2018-01-19 | End: 2018-02-26 | Stop reason: ALTCHOICE

## 2018-01-19 NOTE — MR AVS SNAPSHOT
[address] [place] [address] [address] 2438 [facility] [address] [phone] Patient: Elyse Dietz MRN: [id number]:[id number] Visit Information Date & Time Provider Department Dept. Phone Encounter #  
 1/19/2018 11:15 AM Amado Palma, Da Mno [phone] [id number] Follow-up Instructions Return in about 1 month (around 2/19/2018). Follow-up and Disposition History Upcoming Health Maintenance Date Due  
 MEDICARE YEARLY EXAM 9/21/2018 GLAUCOMA SCREENING Q2Y 8/10/2019 DTaP/Tdap/Td series (2 - Td) 9/19/2026 Allergies as of 1/19/2018  Review Complete On: 1/19/2018 By: Amado Palma MD  
 No Known Allergies Current Immunizations  Reviewed on 8/12/2015 Name Date Influenza High Dose Vaccine PF 9/20/2017 11:01 AM, 9/19/2016 12:12 PM  
 Influenza Vaccine 10/24/2014 11:37 AM, 11/12/2013 11:04 AM, 7/1/2008 Influenza Vaccine PF 9/5/2016 Pneumococcal Conjugate (PCV-13) 9/19/2016 12:13 PM  
 Pneumococcal Polysaccharide (PPSV-23) 9/5/2016, 11/26/2014 10:15 AM, 7/1/2008 Tdap 9/19/2016 12:12 PM, 8/12/2015 11:18 AM  
  
 Not reviewed this visit You Were Diagnosed With   
  
 Codes Comments Unexplained weight loss    -  Primary ICD-10-CM: R63.4 ICD-9-CM: 783.21 Hyperlipidemia, unspecified hyperlipidemia type     ICD-10-CM: E78.5 ICD-9-CM: 272.4 Vitamin D deficiency     ICD-10-CM: E55.9 ICD-9-CM: 268.9 History of heart valve replacement     ICD-10-CM: Z95.2 ICD-9-CM: V43.3 Anemia, unspecified type     ICD-10-CM: D64.9 ICD-9-CM: 367. 9 Chronic atrial fibrillation (HCC)     ICD-10-CM: V89.3 ICD-9-CM: 427.31 Primary osteoarthritis of both knees     ICD-10-CM: M17.0 ICD-9-CM: 715.16 Osteoporosis, unspecified osteoporosis type, unspecified pathological fracture presence     ICD-10-CM: M81.0 ICD-9-CM: 733.00   
 History of total knee replacement, unspecified laterality     ICD-10-CM: G18.523 ICD-9-CM: V43.65 Acute combined systolic and diastolic congestive heart failure (HCC)     ICD-10-CM: I50.41 ICD-9-CM: 428.41, 428.0 Cardiomyopathy, unspecified type ([facility]     ICD-10-CM: I42.9 ICD-9-CM: 425.4 Bilateral carpal tunnel syndrome     ICD-10-CM: G56.03 
ICD-9-CM: 354.0 Vitals BP Pulse Temp Resp Height(growth percentile) Weight(growth percentile) 172/65 (BP 1 Location: Right arm, BP Patient Position: Sitting) 61 (!) 60 °F (15.6 °C) (Oral) 16 4' 10\" (1.473 m) 101 lb (45.8 kg) SpO2 BMI OB Status Smoking Status 98% 21.11 kg/m2 Postmenopausal Former Smoker Vitals History BMI and BSA Data Body Mass Index Body Surface Area  
 21.11 kg/m 2 1.37 m 2 Preferred Pharmacy Pharmacy Name Phone [id number] [address] [address] [address] [phone] Your Updated Medication List  
  
   
This list is accurate as of: 1/19/18 11:49 AM.  Always use your most recent med list.  
  
  
  
  
 alendronate 70 mg tablet Commonly known as:  FOSAMAX  
take 1 tablet by mouth every week with 2,000 UNITS VITAMIN D TABLET  
  
 aspirin 81 mg tablet Take 81 mg by mouth. atorvastatin 20 mg tablet Commonly known as:  LIPITOR Take 1 Tab by mouth daily. calcium carbonate 600 mg calcium (1,500 mg) tablet Commonly known as:  Layman Worship Take 600 mg by mouth two (2) times a day. carvedilol 3.125 mg tablet Commonly known as:  Brandon Members Take 1 Tab by mouth two (2) times daily (with meals). CENTRUM SILVER Tab tablet Generic drug:  multivitamins-minerals-lutein Take  by mouth. diclofenac 1 % Gel Commonly known as:  VOLTAREN Apply 4 g to affected area four (4) times daily. doxepin 10 mg capsule Commonly known as:  SINEquan Take 1 Cap by mouth nightly. esomeprazole 40 mg capsule Commonly known as:  NEXIUM  
take 1 capsule by mouth once daily  
  
 ferrous sulfate, dried 159 mg (45 mg iron) Tber tablet Commonly known as:  SLOW RELEASE IRON Take 1 Tab by mouth daily. guaiFENesin  mg SR tablet Commonly known as:  Giovanni & Giovanni Take 1 Tab by mouth two (2) times a day. lisinopril 10 mg tablet Commonly known as:  Shira Brands Take 1 Tab by mouth daily. mometasone 50 mcg/actuation nasal spray Commonly known as:  NASONEX  
2 Sprays by Both Nostrils route daily. potassium chloride 20 mEq tablet Commonly known as:  K-DUR, KLOR-CON Take 1 Tab by mouth daily. rOPINIRole 0.5 mg tablet Commonly known as:  REQUIP  
take 1 tablet by mouth twice a day  
  
 spironolactone 25 mg tablet Commonly known as:  ALDACTONE Take 25 mg by mouth daily. * warfarin 1 mg tablet Commonly known as:  COUMADIN  
TAKE 1 TABLET BY MOUTH ON TUESDAY,THURSDAY,SATURDAY, AND SUNDAY. SKIP DOSE EVERY MONDAY, WEDNESDAY, AND FRIDAY. * warfarin 6 mg tablet Commonly known as:  COUMADIN  
take 1 tablet by mouth once daily * Notice: This list has 2 medication(s) that are the same as other medications prescribed for you. Read the directions carefully, and ask your doctor or other care provider to review them with you. We Performed the Following AMB POC PT/INR [60092 CPT(R)] Follow-up Instructions Return in about 1 month (around 2/19/2018). To-Do List   
 01/19/2018 Lab:  VITAMIN B12 & FOLATE Introducing [facility]! [facility] introduces [employer] patient portal. Now you can access parts of your medical record, email your doctor's office, and request medication refills online. 1. In your internet browser, go to [email or web address] 2. Click on the First Time User? Click Here link in the Sign In box. You will see the New Member Sign Up page. 3. Enter your [employer] Access Code exactly as it appears below. You will not need to use this code after youve completed the sign-up process. If you do not sign up before the expiration date, you must request a new code. · [employer] Access Code: [email or web address] Expires: 3/20/2018 12:10 PM 
 
4. Enter the last four digits of your Social Security Number (xxxx) and Date of Birth (mm/dd/yyyy) as indicated and click Submit. You will be taken to the next sign-up page. 5. Create a Vidcastert ID. This will be your [employer] login ID and cannot be changed, so think of one that is secure and easy to remember. 6. Create a [employer] password. You can change your password at any time. 7. Enter your Password Reset Question and Answer. This can be used at a later time if you forget your password. 8. Enter your e-mail address. You will receive e-mail notification when new information is available in [address] 9. Click Sign Up. You can now view and download portions of your medical record. 10. Click the Download Summary menu link to download a portable copy of your medical information. If you have questions, please visit the Frequently Asked Questions section of the [employer] website. Remember, [employer] is NOT to be used for urgent needs. For medical emergencies, dial 911. Now available from your iPhone and Android! Please provide this summary of care documentation to your next provider. Your primary care clinician is listed as Federico Cruz. If you have any questions after today's visit, please call [phone].

## 2018-01-19 NOTE — PROGRESS NOTES
HISTORY OF PRESENT ILLNESS  Heather Miller is a 80 y.o. female. HPI   a fib stable  C/o wgt loss   Appetite is good  C/o tingling in hands  C/o insomnia, no orthopnea  Chronic anemia  Review of Systems   Constitutional: Positive for weight loss. Neurological: Positive for sensory change. Psychiatric/Behavioral: The patient has insomnia. All other systems reviewed and are negative. Past Medical History:   Diagnosis Date    Congestive heart failure ([address]     Heart valve replaced     Hypercholesterolemia     Hypertension        Current Outpatient Prescriptions:     warfarin (COUMADIN) 6 mg tablet, take 1 tablet by mouth once daily, Disp: 90 Tab, Rfl: 3    mometasone (NASONEX) 50 mcg/actuation nasal spray, 2 Sprays by Both Nostrils route daily. , Disp: 3 Container, Rfl: 3    carvedilol (COREG) 3.125 mg tablet, Take 1 Tab by mouth two (2) times daily (with meals). , Disp: 60 Tab, Rfl: 3    rOPINIRole (REQUIP) 0.5 mg tablet, take 1 tablet by mouth twice a day, Disp: 180 Tab, Rfl: 3    spironolactone (ALDACTONE) 25 mg tablet, Take 25 mg by mouth daily. , Disp: , Rfl:     warfarin (COUMADIN) 1 mg tablet, TAKE 1 TABLET BY MOUTH ON TUESDAY,THURSDAY,SATURDAY, AND SUNDAY. SKIP DOSE EVERY MONDAY, WEDNESDAY, AND FRIDAY. , Disp: 100 Tab, Rfl: 3    alendronate (FOSAMAX) 70 mg tablet, take 1 tablet by mouth every week with 2,000 UNITS VITAMIN D TABLET, Disp: 12 Tab, Rfl: 3    diclofenac (VOLTAREN) 1 % gel, Apply 4 g to affected area four (4) times daily. , Disp: 500 g, Rfl: 3    atorvastatin (LIPITOR) 20 mg tablet, Take 1 Tab by mouth daily. , Disp: 90 Tab, Rfl: 3    lisinopril (PRINIVIL, ZESTRIL) 10 mg tablet, Take 1 Tab by mouth daily. (Patient taking differently: Take 20 mg by mouth daily.), Disp: 90 Tab, Rfl: 1    esomeprazole (NEXIUM) 40 mg capsule, take 1 capsule by mouth once daily, Disp: 90 Cap, Rfl: 1    potassium chloride (K-DUR, KLOR-CON) 20 mEq tablet, Take 1 Tab by mouth daily. , Disp: 90 Tab, Rfl: 1    ferrous sulfate, dried (SLOW RELEASE IRON) 159 mg (45 mg iron) TbER tablet, Take 1 Tab by mouth daily. , Disp: 30 Tab, Rfl: 0    guaiFENesin SR (MUCINEX) 600 mg SR tablet, Take 1 Tab by mouth two (2) times a day., Disp: 20 Tab, Rfl: 0    aspirin 81 mg tablet, Take 81 mg by mouth.  , Disp: , Rfl:     multivitamins-minerals-lutein (CENTRUM SILVER) Tab, Take  by mouth.  , Disp: , Rfl:     calcium carbonate (CALTREX) 600 mg (1,500 mg) tablet, Take 600 mg by mouth two (2) times a day.  , Disp: , Rfl:   Blood pressure 172/65, pulse 61, temperature (!) 60 °F (15.6 °C), temperature source Oral, resp. rate 16, height 4' 10\" (1.473 m), weight 101 lb (45.8 kg), SpO2 98 %. Physical Exam   Constitutional: She is oriented to person, place, and time. She appears well-developed and well-nourished. No distress. HENT:   Head: Normocephalic and atraumatic. Mouth/Throat: No oropharyngeal exudate. Eyes: Conjunctivae and EOM are normal. Pupils are equal, round, and reactive to light. No scleral icterus. Neck: Normal range of motion. Neck supple. No thyromegaly present. Carotid upstroke normal  - bruits   Cardiovascular: Normal rate, regular rhythm and intact distal pulses. Exam reveals no gallop and no friction rub. Murmur heard. PMI shifted laterally   Pulmonary/Chest: Effort normal and breath sounds normal. No respiratory distress. She has no wheezes. She has no rales. She exhibits no tenderness. Abdominal: Soft. Bowel sounds are normal. She exhibits no distension and no mass. There is no tenderness. There is no rebound and no guarding. Musculoskeletal: Normal range of motion. She exhibits no edema, tenderness or deformity. Lymphadenopathy:     She has no cervical adenopathy. Neurological: She is alert and oriented to person, place, and time. She has normal reflexes. She displays normal reflexes. She exhibits normal muscle tone.  Coordination normal.   + Phalen's sign x 2   Skin: Skin is warm and dry. No rash noted. She is not diaphoretic. No erythema. There is pallor. Vitals reviewed.   inr 1.8    ASSESSMENT and PLAN  a fib stable  Unexplained wt loss, no obvious cause on exam  Chronic anemia under hematology  CTS  Plan  cts info  Labs today  Consider ct scan pending results

## 2018-01-19 NOTE — PROGRESS NOTES
Araceli Alcantar is a 80 y.o. female (: 1930) presenting to address:    Chief Complaint   Patient presents with    Coagulation disorder     pain scale 0/10       Vitals:    18 1058   BP: 172/65   Pulse: 61   Resp: 16   Temp: (!) 60 °F (15.6 °C)   TempSrc: Oral   SpO2: 98%   Weight: 101 lb (45.8 kg)   Height: 4' 10\" (1.473 m)   PainSc:   0 - No pain       Hearing/Vision:   No exam data present    Learning Assessment:     Learning Assessment 3/6/2015   PRIMARY LEARNER Patient   HIGHEST LEVEL OF EDUCATION - PRIMARY LEARNER  DID NOT GRADUATE HIGH SCHOOL   BARRIERS PRIMARY LEARNER NONE   CO-LEARNER CAREGIVER No   PRIMARY LANGUAGE ENGLISH    NEED No   LEARNER PREFERENCE PRIMARY DEMONSTRATION   ANSWERED BY Patient   RELATIONSHIP SELF     Depression Screening:     PHQ over the last two weeks 2018   Little interest or pleasure in doing things Not at all   Feeling down, depressed or hopeless Not at all   Total Score PHQ 2 0     Fall Risk Assessment:     Fall Risk Assessment, last 12 mths 2018   Able to walk? Yes   Fall in past 12 months? No   Fall with injury? -   Number of falls in past 12 months -   Fall Risk Score -     Abuse Screening:     Abuse Screening Questionnaire 10/7/2015   Do you ever feel afraid of your partner? N   Are you in a relationship with someone who physically or mentally threatens you? N   Is it safe for you to go home? Y     Coordination of Care Questionaire:   1. Have you been to the ER, urgent care clinic since your last visit? Hospitalized since your last visit? NO    2. Have you seen or consulted any other health care providers outside of the [address] [place] [address] since your last visit? Include any pap smears or colon screening. NO    Advanced Directive:   1. Do you have an Advanced Directive? NO    2. Would you like information on Advanced Directives?  NO

## 2018-01-20 ENCOUNTER — TELEPHONE (OUTPATIENT)
Dept: FAMILY MEDICINE CLINIC | Age: 83
End: 2018-01-20

## 2018-01-20 DIAGNOSIS — E87.5 HYPERKALEMIA: Primary | ICD-10-CM

## 2018-01-20 LAB
25(OH)D3+25(OH)D2 SERPL-MCNC: 45.7 NG/ML (ref 30–100)
ALBUMIN SERPL-MCNC: 4 G/DL (ref 3.5–4.7)
ALBUMIN/GLOB SERPL: 1.3 {RATIO} (ref 1.2–2.2)
ALP SERPL-CCNC: 68 IU/L (ref 39–117)
ALT SERPL-CCNC: 36 IU/L (ref 0–32)
AST SERPL-CCNC: 35 IU/L (ref 0–40)
BASOPHILS # BLD AUTO: 0 X10E3/UL (ref 0–0.2)
BASOPHILS NFR BLD AUTO: 0 %
BILIRUB SERPL-MCNC: 0.3 MG/DL (ref 0–1.2)
BUN SERPL-MCNC: 38 MG/DL (ref 8–27)
BUN/CREAT SERPL: 27 (ref 12–28)
CALCIUM SERPL-MCNC: 10.1 MG/DL (ref 8.7–10.3)
CHLORIDE SERPL-SCNC: 98 MMOL/L (ref 96–106)
CHOLEST SERPL-MCNC: 147 MG/DL (ref 100–199)
CO2 SERPL-SCNC: 23 MMOL/L (ref 18–29)
CREAT SERPL-MCNC: 1.41 MG/DL (ref 0.57–1)
EOSINOPHIL # BLD AUTO: 0.3 X10E3/UL (ref 0–0.4)
EOSINOPHIL NFR BLD AUTO: 5 %
ERYTHROCYTE [DISTWIDTH] IN BLOOD BY AUTOMATED COUNT: 14.7 % (ref 12.3–15.4)
FOLATE SERPL-MCNC: >20 NG/ML
GLOBULIN SER CALC-MCNC: 3 G/DL (ref 1.5–4.5)
GLUCOSE SERPL-MCNC: 96 MG/DL (ref 65–99)
HCT VFR BLD AUTO: 32 % (ref 34–46.6)
HDLC SERPL-MCNC: 55 MG/DL
HGB BLD-MCNC: 10.3 G/DL (ref 11.1–15.9)
IMM GRANULOCYTES # BLD: 0 X10E3/UL (ref 0–0.1)
IMM GRANULOCYTES NFR BLD: 1 %
INTERPRETATION, 910389: NORMAL
INTERPRETATION: NORMAL
LDLC SERPL CALC-MCNC: 83 MG/DL (ref 0–99)
LYMPHOCYTES # BLD AUTO: 0.7 X10E3/UL (ref 0.7–3.1)
LYMPHOCYTES NFR BLD AUTO: 11 %
MCH RBC QN AUTO: 31.5 PG (ref 26.6–33)
MCHC RBC AUTO-ENTMCNC: 32.2 G/DL (ref 31.5–35.7)
MCV RBC AUTO: 98 FL (ref 79–97)
MONOCYTES # BLD AUTO: 0.7 X10E3/UL (ref 0.1–0.9)
MONOCYTES NFR BLD AUTO: 10 %
NEUTROPHILS # BLD AUTO: 5 X10E3/UL (ref 1.4–7)
NEUTROPHILS NFR BLD AUTO: 73 %
PDF IMAGE, 910387: NORMAL
PLATELET # BLD AUTO: 251 X10E3/UL (ref 150–379)
POTASSIUM SERPL-SCNC: 6.1 MMOL/L (ref 3.5–5.2)
PROT SERPL-MCNC: 7 G/DL (ref 6–8.5)
RBC # BLD AUTO: 3.27 X10E6/UL (ref 3.77–5.28)
SODIUM SERPL-SCNC: 133 MMOL/L (ref 134–144)
T4 FREE SERPL-MCNC: 1.34 NG/DL (ref 0.82–1.77)
TRIGL SERPL-MCNC: 45 MG/DL (ref 0–149)
TSH SERPL DL<=0.005 MIU/L-ACNC: 2.87 UIU/ML (ref 0.45–4.5)
VIT B12 SERPL-MCNC: 884 PG/ML (ref 232–1245)
VLDLC SERPL CALC-MCNC: 9 MG/DL (ref 5–40)
WBC # BLD AUTO: 6.8 X10E3/UL (ref 3.4–10.8)

## 2018-01-29 ENCOUNTER — TELEPHONE (OUTPATIENT)
Dept: FAMILY MEDICINE CLINIC | Age: 83
End: 2018-01-29

## 2018-01-29 NOTE — TELEPHONE ENCOUNTER
Patient is needing to have a prior auth for her \"anxiety medication. \" She does not know the name of  The medication and did not have it on her. States \"That the provider will know what she is talking about. \"

## 2018-01-30 RX ORDER — AMITRIPTYLINE HYDROCHLORIDE 10 MG/1
10 TABLET, FILM COATED ORAL
Qty: 30 TAB | Refills: 0 | Status: SHIPPED | OUTPATIENT
Start: 2018-01-30 | End: 2018-02-26 | Stop reason: ALTCHOICE

## 2018-02-10 DIAGNOSIS — E55.9 VITAMIN D DEFICIENCY: ICD-10-CM

## 2018-02-10 DIAGNOSIS — E78.5 HYPERLIPIDEMIA, UNSPECIFIED HYPERLIPIDEMIA TYPE: ICD-10-CM

## 2018-02-26 ENCOUNTER — OFFICE VISIT (OUTPATIENT)
Dept: FAMILY MEDICINE CLINIC | Age: 83
End: 2018-02-26

## 2018-02-26 VITALS
HEART RATE: 65 BPM | HEIGHT: 58 IN | DIASTOLIC BLOOD PRESSURE: 57 MMHG | OXYGEN SATURATION: 99 % | RESPIRATION RATE: 16 BRPM | WEIGHT: 100 LBS | SYSTOLIC BLOOD PRESSURE: 154 MMHG | BODY MASS INDEX: 20.99 KG/M2 | TEMPERATURE: 98 F

## 2018-02-26 DIAGNOSIS — Z95.2 HISTORY OF HEART VALVE REPLACEMENT: ICD-10-CM

## 2018-02-26 DIAGNOSIS — I50.41 ACUTE COMBINED SYSTOLIC AND DIASTOLIC CONGESTIVE HEART FAILURE (HCC): Primary | ICD-10-CM

## 2018-02-26 LAB
INR BLD: 2.5
PT POC: NORMAL SECONDS
VALID INTERNAL CONTROL?: YES

## 2018-02-26 RX ORDER — BUMETANIDE 2 MG/1
2 TABLET ORAL DAILY
Qty: 90 TAB | Refills: 0
Start: 2018-02-26 | End: 2018-08-21 | Stop reason: SDUPTHER

## 2018-02-26 NOTE — PROGRESS NOTES
HISTORY OF PRESENT ILLNESS  Lydia Yung is a 80 y.o. female. 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 Past Medical History:   Diagnosis Date    Congestive heart failure (HCC)     Heart valve replaced     Hypercholesterolemia     Hypertension        Current Outpatient Prescriptions:     amitriptyline (ELAVIL) 10 mg tablet, Take 1 Tab by mouth nightly., Disp: 30 Tab, Rfl: 0    doxepin (SINEQUAN) 10 mg capsule, Take 1 Cap by mouth nightly., Disp: 30 Cap, Rfl: 1    warfarin (COUMADIN) 6 mg tablet, take 1 tablet by mouth once daily, Disp: 90 Tab, Rfl: 3    mometasone (NASONEX) 50 mcg/actuation nasal spray, 2 Sprays by Both Nostrils route daily. , Disp: 3 Container, Rfl: 3    carvedilol (COREG) 3.125 mg tablet, Take 1 Tab by mouth two (2) times daily (with meals). , Disp: 60 Tab, Rfl: 3    rOPINIRole (REQUIP) 0.5 mg tablet, take 1 tablet by mouth twice a day, Disp: 180 Tab, Rfl: 3    spironolactone (ALDACTONE) 25 mg tablet, Take 25 mg by mouth daily. , Disp: , Rfl:     warfarin (COUMADIN) 1 mg tablet, TAKE 1 TABLET BY MOUTH ON TUESDAY,THURSDAY,SATURDAY, AND SUNDAY. SKIP DOSE EVERY MONDAY, WEDNESDAY, AND FRIDAY. , Disp: 100 Tab, Rfl: 3    alendronate (FOSAMAX) 70 mg tablet, take 1 tablet by mouth every week with 2,000 UNITS VITAMIN D TABLET, Disp: 12 Tab, Rfl: 3    diclofenac (VOLTAREN) 1 % gel, Apply 4 g to affected area four (4) times daily. , Disp: 500 g, Rfl: 3    atorvastatin (LIPITOR) 20 mg tablet, Take 1 Tab by mouth daily. , Disp: 90 Tab, Rfl: 3    lisinopril (PRINIVIL, ZESTRIL) 10 mg tablet, Take 1 Tab by mouth daily.  (Patient taking differently: Take 20 mg by mouth daily.), Disp: 90 Tab, Rfl: 1    esomeprazole (NEXIUM) 40 mg capsule, take 1 capsule by mouth once daily, Disp: 90 Cap, Rfl: 1    potassium chloride (K-DUR, KLOR-CON) 20 mEq tablet, Take 1 Tab by mouth daily. , Disp: 90 Tab, Rfl: 1    ferrous sulfate, dried (SLOW RELEASE IRON) 159 mg (45 mg iron) TbER tablet, Take 1 Tab by mouth daily. , Disp: 30 Tab, Rfl: 0    guaiFENesin SR (MUCINEX) 600 mg SR tablet, Take 1 Tab by mouth two (2) times a day., Disp: 20 Tab, Rfl: 0    aspirin 81 mg tablet, Take 81 mg by mouth.  , Disp: , Rfl:     multivitamins-minerals-lutein (CENTRUM SILVER) Tab, Take  by mouth.  , Disp: , Rfl:     calcium carbonate (CALTREX) 600 mg (1,500 mg) tablet, Take 600 mg by mouth two (2) times a day.  , Disp: , Rfl:   Visit Vitals    /57 (BP 1 Location: Right arm, BP Patient Position: Sitting)    Pulse 65    Temp 98 °F (36.7 °C) (Oral)    Resp 16    Ht 4' 10\" (1.473 m)    Wt 100 lb (45.4 kg)    SpO2 99%    BMI 20.9 kg/m2       Physical Exam   Constitutional: She appears well-developed and well-nourished. No distress. Cardiovascular: Normal rate, regular rhythm and intact distal pulses. Exam reveals no gallop and no friction rub. Murmur heard. Skin: She is not diaphoretic. Vitals reviewed.   inr 2.5    ASSESSMENT and PLAN  prosthetic valve   chf  Plan  Continue rx  Recheck in 1 month

## 2018-02-26 NOTE — MR AVS SNAPSHOT
[address] [place] [address] [address] 8924 [facility] [address] [phone] Patient: Bobbi Parker MRN: [id number]:[id number] Visit Information Date & Time Provider Department Dept. Phone Encounter #  
 2/26/2018  2:00 PM Santino De León MD [facility] [phone] [id number] Follow-up Instructions Return in about 1 month (around 3/26/2018). Follow-up and Disposition History Upcoming Health Maintenance Date Due  
 MEDICARE YEARLY EXAM 9/21/2018 GLAUCOMA SCREENING Q2Y 8/10/2019 DTaP/Tdap/Td series (2 - Td) 9/19/2026 Allergies as of 2/26/2018  Review Complete On: 2/26/2018 By: Santino De León MD  
 No Known Allergies Current Immunizations  Reviewed on 8/12/2015 Name Date Influenza High Dose Vaccine PF 9/20/2017 11:01 AM, 9/19/2016 12:12 PM  
 Influenza Vaccine 10/24/2014 11:37 AM, 11/12/2013 11:04 AM, 7/1/2008 Influenza Vaccine PF 9/5/2016 Pneumococcal Conjugate (PCV-13) 9/19/2016 12:13 PM  
 Pneumococcal Polysaccharide (PPSV-23) 9/5/2016, 11/26/2014 10:15 AM, 7/1/2008 Tdap 9/19/2016 12:12 PM, 8/12/2015 11:18 AM  
  
 Not reviewed this visit You Were Diagnosed With   
  
 Codes Comments Acute combined systolic and diastolic congestive heart failure ([facility]    -  Primary ICD-10-CM: I50.41 ICD-9-CM: 428.41, 428.0 History of heart valve replacement     ICD-10-CM: Z95.2 ICD-9-CM: V43.3 Vitals BP Pulse Temp Resp Height(growth percentile) Weight(growth percentile) 154/57 (BP 1 Location: Right arm, BP Patient Position: Sitting) 65 98 °F (36.7 °C) (Oral) 16 4' 10\" (1.473 m) 100 lb (45.4 kg) SpO2 BMI OB Status Smoking Status 99% 20.9 kg/m2 Postmenopausal Former Smoker BMI and BSA Data Body Mass Index Body Surface Area  
 20.9 kg/m 2 1.36 m 2 Preferred Pharmacy Pharmacy Name Phone [id number] [id number] [address] [phone] Your Updated Medication List  
  
   
This list is accurate as of 2/26/18  2:27 PM.  Always use your most recent med list.  
  
  
  
  
 alendronate 70 mg tablet Commonly known as:  FOSAMAX  
take 1 tablet by mouth every week with 2,000 UNITS VITAMIN D TABLET  
  
 aspirin 81 mg tablet Take 81 mg by mouth. atorvastatin 20 mg tablet Commonly known as:  LIPITOR Take 1 Tab by mouth daily. bumetanide 2 mg tablet Commonly known as:  Velna [place] Take 1 Tab by mouth daily. calcium carbonate 600 mg calcium (1,500 mg) tablet Commonly known as:  Dierdre Baston Take 600 mg by mouth two (2) times a day. carvedilol 3.125 mg tablet Commonly known as:  Ria Esteban Take 1 Tab by mouth two (2) times daily (with meals). CENTRUM SILVER Tab tablet Generic drug:  multivitamins-minerals-lutein Take  by mouth. diclofenac 1 % Gel Commonly known as:  VOLTAREN Apply 4 g to affected area four (4) times daily. esomeprazole 40 mg capsule Commonly known as:  NEXIUM  
take 1 capsule by mouth once daily  
  
 lisinopril 10 mg tablet Commonly known as:  Alexander Suzanna Take 1 Tab by mouth daily. mometasone 50 mcg/actuation nasal spray Commonly known as:  NASONEX  
2 Sprays by Both Nostrils route daily. rOPINIRole 0.5 mg tablet Commonly known as:  REQUIP  
take 1 tablet by mouth twice a day  
  
 warfarin 6 mg tablet Commonly known as:  COUMADIN  
take 1 tablet by mouth once daily We Performed the Following AMB POC PT/INR [62584 CPT(R)] Follow-up Instructions Return in about 1 month (around 3/26/2018). Introducing [facility]! Candace Leyva introduces [employer] patient portal. Now you can access parts of your medical record, email your doctor's office, and request medication refills online.    
 
1. In your internet browser, go to [email or web address] 2. Click on the First Time User? Click Here link in the Sign In box. You will see the New Member Sign Up page. 3. Enter your [employer]ul Access Code exactly as it appears below. You will not need to use this code after youve completed the sign-up process. If you do not sign up before the expiration date, you must request a new code. · [employer] Access Code: [email or web address] Expires: 3/20/2018 12:10 PM 
 
4. Enter the last four digits of your Social Security Number (xxxx) and Date of Birth (mm/dd/yyyy) as indicated and click Submit. You will be taken to the next sign-up page. 5. Create a [employer] ID. This will be your raksul login ID and cannot be changed, so think of one that is secure and easy to remember. 6. Create a raksul password. You can change your password at any time. 7. Enter your Password Reset Question and Answer. This can be used at a later time if you forget your password. 8. Enter your e-mail address. You will receive e-mail notification when new information is available in [address] 9. Click Sign Up. You can now view and download portions of your medical record. 10. Click the Download Summary menu link to download a portable copy of your medical information. If you have questions, please visit the Frequently Asked Questions section of the [employer]ul website. Remember, [employer]ul is NOT to be used for urgent needs. For medical emergencies, dial 911. Now available from your iPhone and Android! Please provide this summary of care documentation to your next provider. Your primary care clinician is listed as Irving Soriano. If you have any questions after today's visit, please call [phone].

## 2018-02-26 NOTE — PROGRESS NOTES
Virginia Perez is a 80 y.o. female (: 1930) presenting to address:    Chief Complaint   Patient presents with   [facility] Follow-up     pain scale 0/10    Coagulation disorder       Vitals:    18 1401   BP: 154/57   Pulse: 65   Resp: 16   Temp: 98 °F (36.7 °C)   TempSrc: Oral   SpO2: 99%   Weight: 100 lb (45.4 kg)   Height: 4' 10\" (1.473 m)   PainSc:   0 - No pain       Hearing/Vision:   No exam data present    Learning Assessment:     Learning Assessment 3/6/2015   PRIMARY LEARNER Patient   HIGHEST LEVEL OF EDUCATION - PRIMARY LEARNER  DID NOT GRADUATE HIGH SCHOOL   BARRIERS PRIMARY LEARNER NONE   CO-LEARNER CAREGIVER No   PRIMARY LANGUAGE ENGLISH    NEED No   LEARNER PREFERENCE PRIMARY DEMONSTRATION   ANSWERED BY Patient   RELATIONSHIP SELF     Depression Screening:     PHQ over the last two weeks 2018   Little interest or pleasure in doing things Not at all   Feeling down, depressed or hopeless Not at all   Total Score PHQ 2 0     Fall Risk Assessment:     Fall Risk Assessment, last 12 mths 2018   Able to walk? Yes   Fall in past 12 months? No   Fall with injury? -   Number of falls in past 12 months -   Fall Risk Score -     Abuse Screening:     Abuse Screening Questionnaire 10/7/2015   Do you ever feel afraid of your partner? N   Are you in a relationship with someone who physically or mentally threatens you? N   Is it safe for you to go home? Y     Coordination of Care Questionaire:   1. Have you been to the ER, urgent care clinic since your last visit? Hospitalized since your last visit? Yes maribel    2. Have you seen or consulted any other health care providers outside of the [address] [place] [address] since your last visit? Include any pap smears or colon screening. Yes cardiology    Advanced Directive:   1. Do you have an Advanced Directive? NO    2. Would you like information on Advanced Directives?  NO

## 2018-04-03 ENCOUNTER — OFFICE VISIT (OUTPATIENT)
Dept: FAMILY MEDICINE CLINIC | Age: 83
End: 2018-04-03

## 2018-04-03 ENCOUNTER — HOSPITAL ENCOUNTER (OUTPATIENT)
Dept: LAB | Age: 83
Discharge: HOME | End: 2018-04-03

## 2018-04-03 VITALS
DIASTOLIC BLOOD PRESSURE: 48 MMHG | TEMPERATURE: 97.2 F | HEIGHT: 58 IN | WEIGHT: 101.8 LBS | OXYGEN SATURATION: 96 % | SYSTOLIC BLOOD PRESSURE: 140 MMHG | RESPIRATION RATE: 16 BRPM | HEART RATE: 54 BPM | BODY MASS INDEX: 21.37 KG/M2

## 2018-04-03 DIAGNOSIS — N18.30 STAGE 3 CHRONIC KIDNEY DISEASE (HCC): Primary | ICD-10-CM

## 2018-04-03 DIAGNOSIS — R73.09 ABNORMAL GLUCOSE: ICD-10-CM

## 2018-04-03 DIAGNOSIS — N18.30 CKD (CHRONIC KIDNEY DISEASE) STAGE 3, GFR 30-59 ML/MIN (HCC): ICD-10-CM

## 2018-04-03 DIAGNOSIS — Z95.2 HISTORY OF HEART VALVE REPLACEMENT: ICD-10-CM

## 2018-04-03 PROBLEM — D46.4 REFRACTORY ANEMIA (HCC): Status: ACTIVE | Noted: 2018-04-03

## 2018-04-03 PROBLEM — R73.03 PREDIABETES: Status: ACTIVE | Noted: 2018-04-03

## 2018-04-03 LAB
INR BLD: 1.8
PT POC: NORMAL SECONDS
VALID INTERNAL CONTROL?: YES

## 2018-04-03 PROCEDURE — 99001 SPECIMEN HANDLING PT-LAB: CPT | Performed by: INTERNAL MEDICINE

## 2018-04-03 NOTE — PROGRESS NOTES
HISTORY OF PRESENT ILLNESS  Howard Webber is a 80 y.o. female. HPI  Prosthetic valve stable  chf in remission  Refractory anemia under hematology  Review of Systems   All other systems reviewed and are negative. Past Medical History:   Diagnosis Date    Congestive heart failure ([address]     Heart valve replaced     Hypercholesterolemia     Hypertension        Current Outpatient Prescriptions:     bumetanide (BUMEX) 2 mg tablet, Take 1 Tab by mouth daily. , Disp: 90 Tab, Rfl: 0    warfarin (COUMADIN) 6 mg tablet, take 1 tablet by mouth once daily, Disp: 90 Tab, Rfl: 3    mometasone (NASONEX) 50 mcg/actuation nasal spray, 2 Sprays by Both Nostrils route daily. , Disp: 3 Container, Rfl: 3    carvedilol (COREG) 3.125 mg tablet, Take 1 Tab by mouth two (2) times daily (with meals). , Disp: 60 Tab, Rfl: 3    rOPINIRole (REQUIP) 0.5 mg tablet, take 1 tablet by mouth twice a day, Disp: 180 Tab, Rfl: 3    alendronate (FOSAMAX) 70 mg tablet, take 1 tablet by mouth every week with 2,000 UNITS VITAMIN D TABLET, Disp: 12 Tab, Rfl: 3    diclofenac (VOLTAREN) 1 % gel, Apply 4 g to affected area four (4) times daily. , Disp: 500 g, Rfl: 3    atorvastatin (LIPITOR) 20 mg tablet, Take 1 Tab by mouth daily. , Disp: 90 Tab, Rfl: 3    lisinopril (PRINIVIL, ZESTRIL) 10 mg tablet, Take 1 Tab by mouth daily.  (Patient taking differently: Take 20 mg by mouth daily.), Disp: 90 Tab, Rfl: 1    esomeprazole (NEXIUM) 40 mg capsule, take 1 capsule by mouth once daily, Disp: 90 Cap, Rfl: 1    aspirin 81 mg tablet, Take 81 mg by mouth.  , Disp: , Rfl:     multivitamins-minerals-lutein (CENTRUM SILVER) Tab, Take  by mouth.  , Disp: , Rfl:     calcium carbonate (CALTREX) 600 mg (1,500 mg) tablet, Take 600 mg by mouth two (2) times a day.  , Disp: , Rfl:   Visit Vitals    /48 (BP 1 Location: Right arm, BP Patient Position: Sitting)    Pulse (!) 54    Temp 97.2 °F (36.2 °C) (Oral)    Resp 16    Ht 4' 10\" (1.473 m)    Wt 101 lb 12.8 oz (46.2 kg)    SpO2 96%    BMI 21.28 kg/m2         Physical Exam   Constitutional: She appears well-developed and well-nourished. No distress. Cardiovascular: Normal rate, regular rhythm and intact distal pulses. Exam reveals no gallop and no friction rub. Murmur heard. Skin: Skin is warm and dry. No rash noted. She is not diaphoretic. No erythema.    inr 1.8  ASSESSMENT and PLAN  prosthetic valve   Anemia  Prediabetes  Plan  A1C, bmp  Recheck in 1 month  Take 1 extra coumadin today only

## 2018-04-03 NOTE — MR AVS SNAPSHOT
[address] [place] [address] [address] [address] [facility] [address] 
[phone] Patient: Kei Mayen MRN: [id number] XM Visit Information Date & Time Provider Department Dept. Phone Encounter #  
 4/3/2018 10:45 AM Catha Shone, [address] [facility] [place] [phone] [id number] Follow-up Instructions Return in about 1 month (around 5/3/2018). Follow-up and Disposition History Upcoming Health Maintenance Date Due Bone Densitometry (Dexa) Screening 1995 MEDICARE YEARLY EXAM 2018 GLAUCOMA SCREENING Q2Y 8/10/2019 DTaP/Tdap/Td series (2 - Td) 2026 Allergies as of 4/3/2018  Review Complete On: 4/3/2018 By: Catha Shone, MD  
 No Known Allergies Current Immunizations  Reviewed on 2015 Name Date Influenza High Dose Vaccine PF 2017 11:01 AM, 2016 12:12 PM  
 Influenza Vaccine 10/24/2014 11:37 AM, 2013 11:04 AM, 2008 Influenza Vaccine PF 2016 Pneumococcal Conjugate (PCV-13) 2016 12:13 PM  
 Pneumococcal Polysaccharide (PPSV-23) 2016, 2014 10:15 AM, 2008 Tdap 2016 12:12 PM, 2015 11:18 AM  
  
 Not reviewed this visit You Were Diagnosed With   
  
 Codes Comments Stage 3 chronic kidney disease    -  Primary ICD-10-CM: N18.3 ICD-9-CM: 585.3 History of heart valve replacement     ICD-10-CM: Z95.2 ICD-9-CM: V43.3 Abnormal glucose     ICD-10-CM: R73.09 
ICD-9-CM: 790.29 CKD (chronic kidney disease) stage 3, GFR 30-59 ml/min     ICD-10-CM: N18.3 ICD-9-CM: 857. 3 Vitals BP Pulse Temp Resp Height(growth percentile) Weight(growth percentile) 140/48 (BP 1 Location: Right arm, BP Patient Position: Sitting) (!) 54 97.2 °F (36.2 °C) (Oral) 16 4' 10\" (1.473 m) 101 lb 12.8 oz (46.2 kg) SpO2 BMI OB Status Smoking Status 96% 21.28 kg/m2 Postmenopausal Former Smoker BMI and BSA Data Body Mass Index Body Surface Area  
 21.28 kg/m 2 1.37 m 2 Preferred Pharmacy Pharmacy Name Phone [id number] [address] [phone] Your Updated Medication List  
  
   
This list is accurate as of 4/3/18 12:00 PM.  Always use your most recent med list.  
  
  
  
  
 alendronate 70 mg tablet Commonly known as:  FOSAMAX  
take 1 tablet by mouth every week with 2,000 UNITS VITAMIN D TABLET  
  
 aspirin 81 mg tablet Take 81 mg by mouth. atorvastatin 20 mg tablet Commonly known as:  LIPITOR Take 1 Tab by mouth daily. bumetanide 2 mg tablet Commonly known as:  Shanika Noun Take 1 Tab by mouth daily. calcium carbonate 600 mg calcium (1,500 mg) tablet Commonly known as:  Cami Joey Take 600 mg by mouth two (2) times a day. carvedilol 3.125 mg tablet Commonly known as:  [place] Jatin Take 1 Tab by mouth two (2) times daily (with meals). CENTRUM SILVER Tab tablet Generic drug:  multivitamins-minerals-lutein Take  by mouth. diclofenac 1 % Gel Commonly known as:  VOLTAREN Apply 4 g to affected area four (4) times daily. esomeprazole 40 mg capsule Commonly known as:  NEXIUM  
take 1 capsule by mouth once daily  
  
 lisinopril 10 mg tablet Commonly known as:  John Eder Take 1 Tab by mouth daily. mometasone 50 mcg/actuation nasal spray Commonly known as:  NASONEX  
2 Sprays by Both Nostrils route daily. rOPINIRole 0.5 mg tablet Commonly known as:  REQUIP  
take 1 tablet by mouth twice a day  
  
 warfarin 6 mg tablet Commonly known as:  COUMADIN  
take 1 tablet by mouth once daily We Performed the Following AMB POC PT/INR [51565 CPT(R)] Follow-up Instructions Return in about 1 month (around 5/3/2018). To-Do List   
 04/03/2018 Lab:  HEMOGLOBIN A1C WITH EAG   
  
 04/03/2018   Lab:  METABOLIC PANEL, BASIC   
  
 Introducing [facility]! Jessica Frankel introduces [employer] patient portal. Now you can access parts of your medical record, email your doctor's office, and request medication refills online. 1. In your internet browser, go to [email or web address] 2. Click on the First Time User? Click Here link in the Sign In box. You will see the New Member Sign Up page. 3. Enter your [employer] Access Code exactly as it appears below. You will not need to use this code after youve completed the sign-up process. If you do not sign up before the expiration date, you must request a new code. · [employer] Access Code: [email or web address] Expires: 7/2/2018 12:00 PM 
 
4. Enter the last four digits of your Social Security Number (xxxx) and Date of Birth (mm/dd/yyyy) as indicated and click Submit. You will be taken to the next sign-up page. 5. Create a [employer] ID. This will be your [employer] login ID and cannot be changed, so think of one that is secure and easy to remember. 6. Create a [employer] password. You can change your password at any time. 7. Enter your Password Reset Question and Answer. This can be used at a later time if you forget your password. 8. Enter your e-mail address. You will receive e-mail notification when new information is available in [address] 9. Click Sign Up. You can now view and download portions of your medical record. 10. Click the Download Summary menu link to download a portable copy of your medical information. If you have questions, please visit the Frequently Asked Questions section of the [employer] website. Remember, [employer] is NOT to be used for urgent needs. For medical emergencies, dial 911. Now available from your iPhone and Android! Please provide this summary of care documentation to your next provider. Your primary care clinician is listed as [place] Christians. If you have any questions after today's visit, please call [phone].

## 2018-04-03 NOTE — PROGRESS NOTES
Sonya Sr is a 80 y.o. female (: 1930) presenting to address:    Chief Complaint   Patient presents with    Coagulation disorder    Shoulder Pain     RT shoulder    pain scale 2/10       Vitals:    18 1105   BP: 140/48   Pulse: (!) 54   Resp: 16   Temp: 97.2 °F (36.2 °C)   TempSrc: Oral   SpO2: 96%   Weight: 101 lb 12.8 oz (46.2 kg)   Height: 4' 10\" (1.473 m)   PainSc:   2   PainLoc: Shoulder       Hearing/Vision:   No exam data present    Learning Assessment:     Learning Assessment 3/6/2015   PRIMARY LEARNER Patient   HIGHEST LEVEL OF EDUCATION - PRIMARY LEARNER  DID NOT GRADUATE HIGH SCHOOL   BARRIERS PRIMARY LEARNER NONE   CO-LEARNER CAREGIVER No   PRIMARY LANGUAGE ENGLISH    NEED No   LEARNER PREFERENCE PRIMARY DEMONSTRATION   ANSWERED BY Patient   RELATIONSHIP SELF     Depression Screening:     PHQ over the last two weeks 4/3/2018   Little interest or pleasure in doing things Not at all   Feeling down, depressed or hopeless Not at all   Total Score PHQ 2 0     Fall Risk Assessment:     Fall Risk Assessment, last 12 mths 4/3/2018   Able to walk? Yes   Fall in past 12 months? No   Fall with injury? -   Number of falls in past 12 months -   Fall Risk Score -     Abuse Screening:     Abuse Screening Questionnaire 10/7/2015   Do you ever feel afraid of your partner? N   Are you in a relationship with someone who physically or mentally threatens you? N   Is it safe for you to go home? Y     Coordination of Care Questionaire:   1. Have you been to the ER, urgent care clinic since your last visit? Hospitalized since your last visit? NO Dr. Arley Mina    2. Have you seen or consulted any other health care providers outside of the [facility] since your last visit? Include any pap smears or colon screening. YES    Advanced Directive:   1. Do you have an Advanced Directive? NO    2. Would you like information on Advanced Directives?  NO

## 2018-04-04 LAB
BUN SERPL-MCNC: 33 MG/DL (ref 8–27)
BUN/CREAT SERPL: 32 (ref 12–28)
CALCIUM SERPL-MCNC: 9.4 MG/DL (ref 8.7–10.3)
CHLORIDE SERPL-SCNC: 98 MMOL/L (ref 96–106)
CO2 SERPL-SCNC: 30 MMOL/L (ref 18–29)
CREAT SERPL-MCNC: 1.04 MG/DL (ref 0.57–1)
EST. AVERAGE GLUCOSE BLD GHB EST-MCNC: 123 MG/DL
GFR SERPLBLD CREATININE-BSD FMLA CKD-EPI: 48 ML/MIN/1.73
GFR SERPLBLD CREATININE-BSD FMLA CKD-EPI: 56 ML/MIN/1.73
GLUCOSE SERPL-MCNC: 106 MG/DL (ref 65–99)
HBA1C MFR BLD: 5.9 % (ref 4.8–5.6)
INTERPRETATION: NORMAL
POTASSIUM SERPL-SCNC: 3.2 MMOL/L (ref 3.5–5.2)
SODIUM SERPL-SCNC: 146 MMOL/L (ref 134–144)

## 2018-05-01 ENCOUNTER — OFFICE VISIT (OUTPATIENT)
Dept: FAMILY MEDICINE CLINIC | Age: 83
End: 2018-05-01

## 2018-05-01 ENCOUNTER — HOSPITAL ENCOUNTER (OUTPATIENT)
Dept: LAB | Age: 83
Discharge: HOME | End: 2018-05-01

## 2018-05-01 VITALS
TEMPERATURE: 97.4 F | OXYGEN SATURATION: 99 % | WEIGHT: 99.2 LBS | BODY MASS INDEX: 20.82 KG/M2 | SYSTOLIC BLOOD PRESSURE: 146 MMHG | HEIGHT: 58 IN | HEART RATE: 110 BPM | DIASTOLIC BLOOD PRESSURE: 90 MMHG | RESPIRATION RATE: 18 BRPM

## 2018-05-01 DIAGNOSIS — N18.30 CKD (CHRONIC KIDNEY DISEASE) STAGE 3, GFR 30-59 ML/MIN (HCC): ICD-10-CM

## 2018-05-01 DIAGNOSIS — R73.03 PREDIABETES: ICD-10-CM

## 2018-05-01 DIAGNOSIS — D46.4 REFRACTORY ANEMIA (HCC): ICD-10-CM

## 2018-05-01 DIAGNOSIS — I48.20 CHRONIC ATRIAL FIBRILLATION (HCC): Primary | ICD-10-CM

## 2018-05-01 LAB
INR BLD: 1.6
PT POC: NORMAL SECONDS
VALID INTERNAL CONTROL?: YES

## 2018-05-01 PROCEDURE — 99001 SPECIMEN HANDLING PT-LAB: CPT | Performed by: INTERNAL MEDICINE

## 2018-05-01 RX ORDER — LISINOPRIL 10 MG/1
10 TABLET ORAL DAILY
Qty: 90 TAB | Refills: 1
Start: 2018-05-01 | End: 2018-10-25

## 2018-05-01 NOTE — PROGRESS NOTES
Aubrey Bridges is a 80 y.o. female (: 1930) presenting to address:    Chief Complaint   Patient presents with    Coagulation disorder    Hip Pain     patient c/o LT hip pain               pain scale 8/10       Vitals:    18 1033   BP: 146/90   Pulse: (!) 110   Resp: 18   Temp: 97.4 °F (36.3 °C)   TempSrc: Oral   SpO2: 99%   Weight: 99 lb 3.2 oz (45 kg)   Height: 4' 10\" (1.473 m)   PainSc:   8   PainLoc: Hip       Hearing/Vision:   No exam data present    Learning Assessment:     Learning Assessment 3/6/2015   PRIMARY LEARNER Patient   HIGHEST LEVEL OF EDUCATION - PRIMARY LEARNER  DID NOT GRADUATE HIGH SCHOOL   BARRIERS PRIMARY LEARNER NONE   CO-LEARNER CAREGIVER No   PRIMARY LANGUAGE ENGLISH    NEED No   LEARNER PREFERENCE PRIMARY DEMONSTRATION   ANSWERED BY Patient   RELATIONSHIP SELF     Depression Screening:     PHQ over the last two weeks 2018   Little interest or pleasure in doing things Not at all   Feeling down, depressed or hopeless Not at all   Total Score PHQ 2 0     Fall Risk Assessment:     Fall Risk Assessment, last 12 mths 2018   Able to walk? Yes   Fall in past 12 months? No   Fall with injury? -   Number of falls in past 12 months -   Fall Risk Score -     Abuse Screening:     Abuse Screening Questionnaire 10/7/2015   Do you ever feel afraid of your partner? N   Are you in a relationship with someone who physically or mentally threatens you? N   Is it safe for you to go home? Y     Coordination of Care Questionaire:   1. Have you been to the ER, urgent care clinic since your last visit? Hospitalized since your last visit? NO    2. Have you seen or consulted any other health care providers outside of the [facility] since your last visit? Include any pap smears or colon screening. NO    Advanced Directive:   1. Do you have an Advanced Directive? NO    2. Would you like information on Advanced Directives?  NO

## 2018-05-01 NOTE — PROGRESS NOTES
HISTORY OF PRESENT ILLNESS  Marley Hughes is a 80 y.o. female. 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 Past Medical History:   Diagnosis Date    Congestive heart failure ([address]     Heart valve replaced     Hypercholesterolemia     Hypertension      Current Outpatient Prescriptions on File Prior to Visit   Medication Sig Dispense Refill    bumetanide (BUMEX) 2 mg tablet Take 1 Tab by mouth daily. 90 Tab 0    warfarin (COUMADIN) 6 mg tablet take 1 tablet by mouth once daily 90 Tab 3    mometasone (NASONEX) 50 mcg/actuation nasal spray 2 Sprays by Both Nostrils route daily. 3 Container 3    carvedilol (COREG) 3.125 mg tablet Take 1 Tab by mouth two (2) times daily (with meals). 60 Tab 3    rOPINIRole (REQUIP) 0.5 mg tablet take 1 tablet by mouth twice a day 180 Tab 3    alendronate (FOSAMAX) 70 mg tablet take 1 tablet by mouth every week with 2,000 UNITS VITAMIN D TABLET 12 Tab 3    diclofenac (VOLTAREN) 1 % gel Apply 4 g to affected area four (4) times daily. 500 g 3    atorvastatin (LIPITOR) 20 mg tablet Take 1 Tab by mouth daily. 90 Tab 3    esomeprazole (NEXIUM) 40 mg capsule take 1 capsule by mouth once daily 90 Cap 1    aspirin 81 mg tablet Take 81 mg by mouth.  multivitamins-minerals-lutein (CENTRUM SILVER) Tab Take  by mouth.  calcium carbonate (CALTREX) 600 mg (1,500 mg) tablet Take 600 mg by mouth two (2) times a day.  lisinopril (PRINIVIL, ZESTRIL) 10 mg tablet Take 1 Tab by mouth daily. (Patient taking differently: Take 20 mg by mouth daily.) 90 Tab 1     No current facility-administered medications on file prior to visit.       Visit Vitals    /90 (BP 1 Location: Right arm, BP Patient Position: Sitting)    Pulse (!) 110    Temp 97.4 °F (36.3 °C) (Oral)    Resp 18    Ht 4' 10\" (1.473 m)    Wt 99 lb 3.2 oz (45 kg)    SpO2 99%    BMI 20.73 kg/m2       Physical Exam Constitutional: She appears well-developed and well-nourished. No distress. Cardiovascular: Normal rate and intact distal pulses. Exam reveals no gallop and no friction rub. Murmur heard. irregular   Musculoskeletal: Normal range of motion. She exhibits no edema, tenderness or deformity. Skin: She is not diaphoretic. Vitals reviewed.   inr 1.6  ASSESSMENT and PLAN  a fib   Anemia  ckd  Plan  Take 1 extra coumadin today   Resume dosing  Bmp, cbc today  Recheck in 1 month

## 2018-05-01 NOTE — MR AVS SNAPSHOT
Liza Wise 
 
 
 [address] [address] [facility] [id number] [phone] Patient: Sonya Beltre MRN: [id number] MIB:[id number] Visit Information Date & Time Provider Department Dept. Phone Encounter #  
 5/1/2018 10:30 AM Sukhjinder Hooper, [place] [phone] [id number] Follow-up Instructions Return in about 1 month (around 6/1/2018). Follow-up and Disposition History Upcoming Health Maintenance Date Due Bone Densitometry (Dexa) Screening 12/25/1995 Influenza Age 5 to Adult 8/1/2018 MEDICARE YEARLY EXAM 9/21/2018 GLAUCOMA SCREENING Q2Y 8/10/2019 DTaP/Tdap/Td series (2 - Td) 9/19/2026 Allergies as of 5/1/2018  Review Complete On: 5/1/2018 By: Sukhjinder Hooper MD  
 No Known Allergies Current Immunizations  Reviewed on 8/12/2015 Name Date Influenza High Dose Vaccine PF 9/20/2017 11:01 AM, 9/19/2016 12:12 PM  
 Influenza Vaccine 10/24/2014 11:37 AM, 11/12/2013 11:04 AM, 7/1/2008 Influenza Vaccine PF 9/5/2016 Pneumococcal Conjugate (PCV-13) 9/19/2016 12:13 PM  
 Pneumococcal Polysaccharide (PPSV-23) 9/5/2016, 11/26/2014 10:15 AM, 7/1/2008 Tdap 9/19/2016 12:12 PM, 8/12/2015 11:18 AM  
  
 Not reviewed this visit You Were Diagnosed With   
  
 Codes Comments Chronic atrial fibrillation (HCC)    -  Primary ICD-10-CM: M38.5 ICD-9-CM: 427.31 Prediabetes     ICD-10-CM: R73.03 
ICD-9-CM: 790.29 CKD (chronic kidney disease) stage 3, GFR 30-59 ml/min     ICD-10-CM: N18.3 ICD-9-CM: 270. 3 Refractory anemia (HCC)     ICD-10-CM: D46.4 ICD-9-CM: 238.72 Vitals BP Pulse Temp Resp Height(growth percentile) Weight(growth percentile) 146/90 (BP 1 Location: Right arm, BP Patient Position: Sitting) (!) 110 97.4 °F (36.3 °C) (Oral) 18 4' 10\" (1.473 m) 99 lb 3.2 oz (45 kg) SpO2 BMI OB Status Smoking Status 99% 20.73 kg/m2 Postmenopausal Former Smoker BMI and BSA Data Body Mass Index Body Surface Area 20.73 kg/m 2 1.36 m 2 Preferred Pharmacy Pharmacy Name Phone [facility] [address] [address] [phone] Your Updated Medication List  
  
   
This list is accurate as of 5/1/18 11:23 AM.  Always use your most recent med list.  
  
  
  
  
 alendronate 70 mg tablet Commonly known as:  FOSAMAX  
take 1 tablet by mouth every week with 2,000 UNITS VITAMIN D TABLET  
  
 aspirin 81 mg tablet Take 81 mg by mouth. atorvastatin 20 mg tablet Commonly known as:  LIPITOR Take 1 Tab by mouth daily. bumetanide 2 mg tablet Commonly known as:  Shelby Neeru Take 1 Tab by mouth daily. calcium carbonate 600 mg calcium (1,500 mg) tablet Commonly known as:  Huong Nickels Take 600 mg by mouth two (2) times a day. carvedilol 3.125 mg tablet Commonly known as:  [place] Fritter Take 1 Tab by mouth two (2) times daily (with meals). CENTRUM SILVER Tab tablet Generic drug:  multivitamins-minerals-lutein Take  by mouth. diclofenac 1 % Gel Commonly known as:  VOLTAREN Apply 4 g to affected area four (4) times daily. esomeprazole 40 mg capsule Commonly known as:  NEXIUM  
take 1 capsule by mouth once daily  
  
 lisinopril 10 mg tablet Commonly known as:  Juan Hails Take 1 Tab by mouth daily. mometasone 50 mcg/actuation nasal spray Commonly known as:  NASONEX  
2 Sprays by Both Nostrils route daily. rOPINIRole 0.5 mg tablet Commonly known as:  REQUIP  
take 1 tablet by mouth twice a day  
  
 warfarin 6 mg tablet Commonly known as:  COUMADIN  
take 1 tablet by mouth once daily We Performed the Following AMB POC PT/INR [49165 CPT(R)] Follow-up Instructions Return in about 1 month (around 6/1/2018). To-Do List   
 05/01/2018   Lab:  CBC WITH AUTOMATED DIFF   
 05/01/2018 Lab:  METABOLIC PANEL, BASIC Introducing [facility]! Titi Iyer introduces [employer] patient portal. Now you can access parts of your medical record, email your doctor's office, and request medication refills online. 1. In your internet browser, go to [email or web address] 2. Click on the First Time User? Click Here link in the Sign In box. You will see the New Member Sign Up page. 3. Enter your [employer] Access Code exactly as it appears below. You will not need to use this code after youve completed the sign-up process. If you do not sign up before the expiration date, you must request a new code. · [employer] Access Code: [email or web address] Expires: 7/2/2018 12:00 PM 
 
4. Enter the last four digits of your Social Security Number (xxxx) and Date of Birth (mm/dd/yyyy) as indicated and click Submit. You will be taken to the next sign-up page. 5. Create a [employer] ID. This will be your [employer] login ID and cannot be changed, so think of one that is secure and easy to remember. 6. Create a [employer] password. You can change your password at any time. 7. Enter your Password Reset Question and Answer. This can be used at a later time if you forget your password. 8. Enter your e-mail address. You will receive e-mail notification when new information is available in [address] 9. Click Sign Up. You can now view and download portions of your medical record. 10. Click the Download Summary menu link to download a portable copy of your medical information. If you have questions, please visit the Frequently Asked Questions section of the [employer] website. Remember, [employer] is NOT to be used for urgent needs. For medical emergencies, dial 911. Now available from your iPhone and Android! Please provide this summary of care documentation to your next provider. Your primary care clinician is listed as Chicho Tovar.  If you have any questions after today's visit, please call [phone].

## 2018-05-02 LAB
BASOPHILS # BLD AUTO: 0 X10E3/UL (ref 0–0.2)
BASOPHILS NFR BLD AUTO: 0 %
BUN SERPL-MCNC: 28 MG/DL (ref 8–27)
BUN/CREAT SERPL: 26 (ref 12–28)
CALCIUM SERPL-MCNC: 10 MG/DL (ref 8.7–10.3)
CHLORIDE SERPL-SCNC: 95 MMOL/L (ref 96–106)
CO2 SERPL-SCNC: 28 MMOL/L (ref 18–29)
CREAT SERPL-MCNC: 1.09 MG/DL (ref 0.57–1)
EOSINOPHIL # BLD AUTO: 0.1 X10E3/UL (ref 0–0.4)
EOSINOPHIL NFR BLD AUTO: 2 %
ERYTHROCYTE [DISTWIDTH] IN BLOOD BY AUTOMATED COUNT: 14.4 % (ref 12.3–15.4)
GFR SERPLBLD CREATININE-BSD FMLA CKD-EPI: 46 ML/MIN/1.73
GFR SERPLBLD CREATININE-BSD FMLA CKD-EPI: 53 ML/MIN/1.73
GLUCOSE SERPL-MCNC: 112 MG/DL (ref 65–99)
HCT VFR BLD AUTO: 35.8 % (ref 34–46.6)
HGB BLD-MCNC: 11.7 G/DL (ref 11.1–15.9)
IMM GRANULOCYTES # BLD: 0 X10E3/UL (ref 0–0.1)
IMM GRANULOCYTES NFR BLD: 0 %
INTERPRETATION: NORMAL
LYMPHOCYTES # BLD AUTO: 0.6 X10E3/UL (ref 0.7–3.1)
LYMPHOCYTES NFR BLD AUTO: 9 %
MCH RBC QN AUTO: 32.4 PG (ref 26.6–33)
MCHC RBC AUTO-ENTMCNC: 32.7 G/DL (ref 31.5–35.7)
MCV RBC AUTO: 99 FL (ref 79–97)
MONOCYTES # BLD AUTO: 0.7 X10E3/UL (ref 0.1–0.9)
MONOCYTES NFR BLD AUTO: 11 %
NEUTROPHILS # BLD AUTO: 5 X10E3/UL (ref 1.4–7)
NEUTROPHILS NFR BLD AUTO: 78 %
PLATELET # BLD AUTO: 184 X10E3/UL (ref 150–379)
POTASSIUM SERPL-SCNC: 3.5 MMOL/L (ref 3.5–5.2)
RBC # BLD AUTO: 3.61 X10E6/UL (ref 3.77–5.28)
SODIUM SERPL-SCNC: 141 MMOL/L (ref 134–144)
WBC # BLD AUTO: 6.4 X10E3/UL (ref 3.4–10.8)

## 2018-05-19 RX ORDER — ALENDRONATE SODIUM 70 MG/1
TABLET ORAL
Qty: 12 TAB | Refills: 3 | Status: SHIPPED | OUTPATIENT
Start: 2018-05-19 | End: 2019-07-29 | Stop reason: SDUPTHER

## 2018-06-08 ENCOUNTER — OFFICE VISIT (OUTPATIENT)
Dept: FAMILY MEDICINE CLINIC | Age: 83
End: 2018-06-08

## 2018-06-08 VITALS
TEMPERATURE: 97.5 F | BODY MASS INDEX: 20.57 KG/M2 | RESPIRATION RATE: 20 BRPM | WEIGHT: 98 LBS | SYSTOLIC BLOOD PRESSURE: 135 MMHG | OXYGEN SATURATION: 94 % | HEIGHT: 58 IN | DIASTOLIC BLOOD PRESSURE: 73 MMHG | HEART RATE: 62 BPM

## 2018-06-08 DIAGNOSIS — R73.03 PREDIABETES: Primary | ICD-10-CM

## 2018-06-08 DIAGNOSIS — I48.20 CHRONIC ATRIAL FIBRILLATION (HCC): ICD-10-CM

## 2018-06-08 RX ORDER — WARFARIN 1 MG/1
TABLET ORAL
Qty: 30 TAB | Refills: 0
Start: 2018-06-08 | End: 2018-09-25 | Stop reason: SDUPTHER

## 2018-06-08 NOTE — MR AVS SNAPSHOT
[address] [place] [address] [address] [facility] [address] [phone] Patient: Esther Soares MRN: [id number] LKE:[birth year]91 Visit Information Date & Time Provider Department Dept. Phone Encounter #  
 6/8/2018 10:45 AM Laura Menezes, [facility] [phone] [id number] Follow-up Instructions Return in about 1 month (around 7/8/2018). Follow-up and Disposition History Upcoming Health Maintenance Date Due Bone Densitometry (Dexa) Screening 12/25/1995 Influenza Age 5 to Adult 8/1/2018 MEDICARE YEARLY EXAM 9/21/2018 GLAUCOMA SCREENING Q2Y 8/10/2019 DTaP/Tdap/Td series (2 - Td) 9/19/2026 Allergies as of 6/8/2018  Review Complete On: 6/8/2018 By: Laura Menezes MD  
 No Known Allergies Current Immunizations  Reviewed on 8/12/2015 Name Date Influenza High Dose Vaccine PF 9/20/2017 11:01 AM, 9/19/2016 12:12 PM  
 Influenza Vaccine 10/24/2014 11:37 AM, 11/12/2013 11:04 AM, 7/1/2008 Influenza Vaccine PF 9/5/2016 Pneumococcal Conjugate (PCV-13) 9/19/2016 12:13 PM  
 Pneumococcal Polysaccharide (PPSV-23) 9/5/2016, 11/26/2014 10:15 AM, 7/1/2008 Tdap 9/19/2016 12:12 PM, 8/12/2015 11:18 AM  
  
 Not reviewed this visit You Were Diagnosed With   
  
 Codes Comments Prediabetes    -  Primary ICD-10-CM: R73.03 
ICD-9-CM: 790.29 Chronic atrial fibrillation (HCC)     ICD-10-CM: L14.1 ICD-9-CM: 427.31 Vitals BP Pulse Temp Resp Height(growth percentile) Weight(growth percentile) 135/73 (BP 1 Location: Right arm, BP Patient Position: Sitting) 62 97.5 °F (36.4 °C) (Oral) 20 4' 10\" (1.473 m) 98 lb (44.5 kg) SpO2 BMI OB Status Smoking Status 94% 20.48 kg/m2 Postmenopausal Former Smoker BMI and BSA Data Body Mass Index Body Surface Area  
 20.48 kg/m 2 1.35 m 2 Preferred Pharmacy Pharmacy Name Phone [id number] [address] [address] [place] [address] [phone] Your Updated Medication List  
  
   
This list is accurate as of 6/8/18 11:49 AM.  Always use your most recent med list.  
  
  
  
  
 alendronate 70 mg tablet Commonly known as:  FOSAMAX  
take 1 tablet by mouth every week WITH 2,000 UNIT VIT D  
  
 aspirin 81 mg tablet Take 81 mg by mouth. atorvastatin 20 mg tablet Commonly known as:  LIPITOR Take 1 Tab by mouth daily. bumetanide 2 mg tablet Commonly known as:  Pamalee Garter Take 1 Tab by mouth daily. calcium carbonate 600 mg calcium (1,500 mg) tablet Commonly known as:  Arielle Blanks Take 600 mg by mouth two (2) times a day. carvedilol 3.125 mg tablet Commonly known as:  Fabienne Menghini Take 1 Tab by mouth two (2) times daily (with meals). CENTRUM SILVER Tab tablet Generic drug:  multivitamins-minerals-lutein Take  by mouth. diclofenac 1 % Gel Commonly known as:  VOLTAREN Apply 4 g to affected area four (4) times daily. esomeprazole 40 mg capsule Commonly known as:  NEXIUM  
take 1 capsule by mouth once daily  
  
 lisinopril 10 mg tablet Commonly known as:  Charisse Golder Take 1 Tab by mouth daily. mometasone 50 mcg/actuation nasal spray Commonly known as:  NASONEX  
2 Sprays by Both Nostrils route daily. rOPINIRole 0.5 mg tablet Commonly known as:  REQUIP  
take 1 tablet by mouth twice a day * warfarin 6 mg tablet Commonly known as:  COUMADIN  
take 1 tablet by mouth once daily * warfarin 1 mg tablet Commonly known as:  COUMADIN Take 1 every Saturday and Sunday * Notice: This list has 2 medication(s) that are the same as other medications prescribed for you. Read the directions carefully, and ask your doctor or other care provider to review them with you. Follow-up Instructions Return in about 1 month (around 7/8/2018). Introducing [facility]! Mehran Hill introduces [employer] patient portal. Now you can access parts of your medical record, email your doctor's office, and request medication refills online. 1. In your internet browser, go to [email or web address] 2. Click on the First Time User? Click Here link in the Sign In box. You will see the New Member Sign Up page. 3. Enter your [employer] Access Code exactly as it appears below. You will not need to use this code after youve completed the sign-up process. If you do not sign up before the expiration date, you must request a new code. · [employer] Access Code: [email or web address] Expires: 7/2/2018 12:00 PM 
 
4. Enter the last four digits of your Social Security Number (xxxx) and Date of Birth (mm/dd/yyyy) as indicated and click Submit. You will be taken to the next sign-up page. 5. Create a [employer] ID. This will be your [employer] login ID and cannot be changed, so think of one that is secure and easy to remember. 6. Create a [employer] password. You can change your password at any time. 7. Enter your Password Reset Question and Answer. This can be used at a later time if you forget your password. 8. Enter your e-mail address. You will receive e-mail notification when new information is available in [address] 9. Click Sign Up. You can now view and download portions of your medical record. 10. Click the Download Summary menu link to download a portable copy of your medical information. If you have questions, please visit the Frequently Asked Questions section of the [employer] website. Remember, [employer] is NOT to be used for urgent needs. For medical emergencies, dial 911. Now available from your iPhone and Android! Please provide this summary of care documentation to your next provider. Your primary care clinician is listed as Render So. If you have any questions after today's visit, please call [phone].

## 2018-06-08 NOTE — PROGRESS NOTES
HISTORY OF PRESENT ILLNESS  Lloyd Grace is a 80 y.o. female. HPI  Prediabetic stable  A fib stable  Review of Systems   All other systems reviewed and are negative. Past Medical History:   Diagnosis Date    Congestive heart failure ([address]     Heart valve replaced     Hypercholesterolemia     Hypertension      Current Outpatient Prescriptions on File Prior to Visit   Medication Sig Dispense Refill    alendronate (FOSAMAX) 70 mg tablet take 1 tablet by mouth every week WITH 2,000 UNIT VIT D 12 Tab 3    lisinopril (PRINIVIL, ZESTRIL) 10 mg tablet Take 1 Tab by mouth daily. 90 Tab 1    bumetanide (BUMEX) 2 mg tablet Take 1 Tab by mouth daily. 90 Tab 0    warfarin (COUMADIN) 6 mg tablet take 1 tablet by mouth once daily 90 Tab 3    mometasone (NASONEX) 50 mcg/actuation nasal spray 2 Sprays by Both Nostrils route daily. 3 Container 3    carvedilol (COREG) 3.125 mg tablet Take 1 Tab by mouth two (2) times daily (with meals). 60 Tab 3    rOPINIRole (REQUIP) 0.5 mg tablet take 1 tablet by mouth twice a day 180 Tab 3    diclofenac (VOLTAREN) 1 % gel Apply 4 g to affected area four (4) times daily. 500 g 3    atorvastatin (LIPITOR) 20 mg tablet Take 1 Tab by mouth daily. 90 Tab 3    esomeprazole (NEXIUM) 40 mg capsule take 1 capsule by mouth once daily 90 Cap 1    aspirin 81 mg tablet Take 81 mg by mouth.  multivitamins-minerals-lutein (CENTRUM SILVER) Tab Take  by mouth.  calcium carbonate (CALTREX) 600 mg (1,500 mg) tablet Take 600 mg by mouth two (2) times a day. No current facility-administered medications on file prior to visit. Visit Vitals    /73 (BP 1 Location: Right arm, BP Patient Position: Sitting)    Pulse 62    Temp 97.5 °F (36.4 °C) (Oral)    Resp 20    Ht 4' 10\" (1.473 m)    Wt 98 lb (44.5 kg)    SpO2 94%    BMI 20.48 kg/m2         Physical Exam   Constitutional: She appears well-developed and well-nourished. No distress.    Musculoskeletal: Normal range of motion. She exhibits no edema, tenderness or deformity. Skin: She is not diaphoretic. Vitals reviewed.   inr 1.4    ASSESSMENT and PLAN  a fib   Plan  Eating more greens  Increase coumadin by 1 mg sat/sun

## 2018-06-08 NOTE — PROGRESS NOTES
Solomon Martin is a 80 y.o. female (: 1930) presenting to address:    Chief Complaint   Patient presents with    Coagulation disorder     pain scale 0/10       Vitals:    18 1101   BP: 135/73   Pulse: 62   Resp: 20   Temp: 97.5 °F (36.4 °C)   TempSrc: Oral   SpO2: 94%   Weight: 98 lb (44.5 kg)   Height: 4' 10\" (1.473 m)   PainSc:   0 - No pain       Hearing/Vision:   No exam data present    Learning Assessment:     Learning Assessment 3/6/2015   PRIMARY LEARNER Patient   HIGHEST LEVEL OF EDUCATION - PRIMARY LEARNER  DID NOT GRADUATE HIGH SCHOOL   BARRIERS PRIMARY LEARNER NONE   CO-LEARNER CAREGIVER No   PRIMARY LANGUAGE ENGLISH    NEED No   LEARNER PREFERENCE PRIMARY DEMONSTRATION   ANSWERED BY Patient   RELATIONSHIP SELF     Depression Screening:     PHQ over the last two weeks 2018   Little interest or pleasure in doing things Not at all   Feeling down, depressed or hopeless Not at all   Total Score PHQ 2 0     Fall Risk Assessment:     Fall Risk Assessment, last 12 mths 2018   Able to walk? Yes   Fall in past 12 months? No   Fall with injury? -   Number of falls in past 12 months -   Fall Risk Score -     Abuse Screening:     Abuse Screening Questionnaire 10/7/2015   Do you ever feel afraid of your partner? N   Are you in a relationship with someone who physically or mentally threatens you? N   Is it safe for you to go home? Y     Coordination of Care Questionaire:   1. Have you been to the ER, urgent care clinic since your last visit? Hospitalized since your last visit? NO    2. Have you seen or consulted any other health care providers outside of the [address] [place] [address] since your last visit? Include any pap smears or colon screening. NO    Advanced Directive:   1. Do you have an Advanced Directive? NO    2. Would you like information on Advanced Directives?  NO

## 2018-06-11 RX ORDER — ROPINIROLE 0.5 MG/1
TABLET, FILM COATED ORAL
Qty: 180 TAB | Refills: 3 | Status: SHIPPED | OUTPATIENT
Start: 2018-06-11 | End: 2019-04-12 | Stop reason: SDUPTHER

## 2018-06-11 RX ORDER — ATORVASTATIN CALCIUM 20 MG/1
TABLET, FILM COATED ORAL
Qty: 90 TAB | Refills: 3 | Status: SHIPPED | OUTPATIENT
Start: 2018-06-11 | End: 2019-05-29 | Stop reason: SDUPTHER

## 2018-07-10 ENCOUNTER — OFFICE VISIT (OUTPATIENT)
Dept: FAMILY MEDICINE CLINIC | Age: 83
End: 2018-07-10

## 2018-07-10 VITALS
HEIGHT: 58 IN | SYSTOLIC BLOOD PRESSURE: 155 MMHG | BODY MASS INDEX: 20.87 KG/M2 | WEIGHT: 99.4 LBS | RESPIRATION RATE: 16 BRPM | OXYGEN SATURATION: 95 % | DIASTOLIC BLOOD PRESSURE: 76 MMHG | HEART RATE: 104 BPM | TEMPERATURE: 97.6 F

## 2018-07-10 DIAGNOSIS — E11.9 CONTROLLED TYPE 2 DIABETES MELLITUS WITHOUT COMPLICATION, WITHOUT LONG-TERM CURRENT USE OF INSULIN (HCC): ICD-10-CM

## 2018-07-10 DIAGNOSIS — G25.81 RESTLESS LEG SYNDROME: ICD-10-CM

## 2018-07-10 DIAGNOSIS — I48.20 CHRONIC ATRIAL FIBRILLATION (HCC): Primary | ICD-10-CM

## 2018-07-10 LAB
INR BLD: 2.3
PT POC: NORMAL SECONDS
VALID INTERNAL CONTROL?: YES

## 2018-07-10 NOTE — MR AVS SNAPSHOT
[address] [place] [address] [address] [facility] [id number] [phone] Patient: Maycol Franklin MRN: [id number] HNN:56/72/7988 Visit Information Date & Time Provider Department Dept. Phone Encounter #  
 7/10/2018 10:45 AM Alicia Mcfarlane [facility] [phone] [id number] Follow-up Instructions Return in about 1 month (around 8/10/2018). Follow-up and Disposition History Upcoming Health Maintenance Date Due Bone Densitometry (Dexa) Screening 12/25/1995 Influenza Age 5 to Adult 8/1/2018 MEDICARE YEARLY EXAM 9/21/2018 GLAUCOMA SCREENING Q2Y 8/10/2019 DTaP/Tdap/Td series (2 - Td) 9/19/2026 Allergies as of 7/10/2018  Review Complete On: 7/10/2018 By: Alicia Mcfarlane MD  
 No Known Allergies Current Immunizations  Reviewed on 8/12/2015 Name Date Influenza High Dose Vaccine PF 9/20/2017 11:01 AM, 9/19/2016 12:12 PM  
 Influenza Vaccine 10/24/2014 11:37 AM, 11/12/2013 11:04 AM, 7/1/2008 Influenza Vaccine PF 9/5/2016 Pneumococcal Conjugate (PCV-13) 9/19/2016 12:13 PM  
 Pneumococcal Polysaccharide (PPSV-23) 9/5/2016, 11/26/2014 10:15 AM, 7/1/2008 Tdap 9/19/2016 12:12 PM, 8/12/2015 11:18 AM  
  
 Not reviewed this visit You Were Diagnosed With   
  
 Codes Comments Chronic atrial fibrillation (HCC)    -  Primary ICD-10-CM: T23.8 ICD-9-CM: 427.31 Controlled type 2 diabetes mellitus without complication, without long-term current use of insulin ([facility]     ICD-10-CM: E11.9 ICD-9-CM: 250.00 Restless leg syndrome     ICD-10-CM: G25.81 ICD-9-CM: 333.94 Vitals BP Pulse Temp Resp Height(growth percentile) Weight(growth percentile) 158/78 (BP 1 Location: Right arm, BP Patient Position: Sitting) (!) 104 97.6 °F (36.4 °C) (Oral) 16 4' 10\" (1.473 m) 99 lb 6.4 oz (45.1 kg) SpO2 BMI OB Status Smoking Status 95% 20.77 kg/m2 Postmenopausal Former Smoker Vitals History BMI and BSA Data Body Mass Index Body Surface Area 20.77 kg/m 2 1.36 m 2 Preferred Pharmacy Pharmacy Name Phone [facility] [address] [address] [phone] Your Updated Medication List  
  
   
This list is accurate as of 7/10/18 11:27 AM.  Always use your most recent med list.  
  
  
  
  
 alendronate 70 mg tablet Commonly known as:  FOSAMAX  
take 1 tablet by mouth every week WITH 2,000 UNIT VIT D  
  
 aspirin 81 mg tablet Take 81 mg by mouth. atorvastatin 20 mg tablet Commonly known as:  LIPITOR  
take 1 tablet by mouth once daily  
  
 bumetanide 2 mg tablet Commonly known as:  Marcheta Mocha Take 1 Tab by mouth daily. calcium carbonate 600 mg calcium (1,500 mg) tablet Commonly known as:  Liza Yazidi Take 600 mg by mouth two (2) times a day. carvedilol 3.125 mg tablet Commonly known as:  Tomer Been Take 1 Tab by mouth two (2) times daily (with meals). CENTRUM SILVER Tab tablet Generic drug:  multivitamins-minerals-lutein Take  by mouth. diclofenac 1 % Gel Commonly known as:  VOLTAREN Apply 4 g to affected area four (4) times daily. esomeprazole 40 mg capsule Commonly known as:  NEXIUM  
take 1 capsule by mouth once daily  
  
 lisinopril 10 mg tablet Commonly known as:  Mollie Ripa Take 1 Tab by mouth daily. mometasone 50 mcg/actuation nasal spray Commonly known as:  NASONEX  
2 Sprays by Both Nostrils route daily. rOPINIRole 0.5 mg tablet Commonly known as:  REQUIP  
take 1 tablet by mouth twice a day * warfarin 6 mg tablet Commonly known as:  COUMADIN  
take 1 tablet by mouth once daily * warfarin 1 mg tablet Commonly known as:  COUMADIN Take 1 every Saturday and Sunday * Notice:   This list has 2 medication(s) that are the same as other medications prescribed for you. Read the directions carefully, and ask your doctor or other care provider to review them with you. We Performed the Following AMB POC PT/INR [45069 CPT(R)] Follow-up Instructions Return in about 1 month (around 8/10/2018). Introducing [facility]! Reyna Willis introduces "[employer] patient portal. Now you can access parts of your medical record, email your doctor's office, and request medication refills online. 1. In your internet browser, go to [email or web address] 2. Click on the First Time User? Click Here link in the Sign In box. You will see the New Member Sign Up page. 3. Enter your "[employer] Access Code exactly as it appears below. You will not need to use this code after youve completed the sign-up process. If you do not sign up before the expiration date, you must request a new code. · "[employer] Access Code: [email or web address] Expires: 10/8/2018 11:27 AM 
 
4. Enter the last four digits of your Social Security Number (xxxx) and Date of Birth (mm/dd/yyyy) as indicated and click Submit. You will be taken to the next sign-up page. 5. Create a "[employer] ID. This will be your "[employer] login ID and cannot be changed, so think of one that is secure and easy to remember. 6. Create a "[employer] password. You can change your password at any time. 7. Enter your Password Reset Question and Answer. This can be used at a later time if you forget your password. 8. Enter your e-mail address. You will receive e-mail notification when new information is available in [address] 9. Click Sign Up. You can now view and download portions of your medical record. 10. Click the Download Summary menu link to download a portable copy of your medical information. If you have questions, please visit the Frequently Asked Questions section of the "[employer] website.  Remember, "[employer] is NOT to be used for urgent needs. For medical emergencies, dial 911. Now available from your iPhone and Android! Please provide this summary of care documentation to your next provider. Your primary care clinician is listed as Snow Singh. If you have any questions after today's visit, please call [phone].

## 2018-07-10 NOTE — PROGRESS NOTES
HISTORY OF PRESENT ILLNESS  Keith Issa is a 80 y.o. female. HPI   a fib stable  aodm stable off meds  C/o restless legs  Review of Systems   All other systems reviewed and are negative. Past Medical History:   Diagnosis Date    Congestive heart failure ([address]     Heart valve replaced     Hypercholesterolemia     Hypertension        Current Outpatient Prescriptions:     atorvastatin (LIPITOR) 20 mg tablet, take 1 tablet by mouth once daily, Disp: 90 Tab, Rfl: 3    rOPINIRole (REQUIP) 0.5 mg tablet, take 1 tablet by mouth twice a day, Disp: 180 Tab, Rfl: 3    warfarin (COUMADIN) 1 mg tablet, Take 1 every Saturday and Sunday, Disp: 30 Tab, Rfl: 0    alendronate (FOSAMAX) 70 mg tablet, take 1 tablet by mouth every week WITH 2,000 UNIT VIT D, Disp: 12 Tab, Rfl: 3    lisinopril (PRINIVIL, ZESTRIL) 10 mg tablet, Take 1 Tab by mouth daily. , Disp: 90 Tab, Rfl: 1    bumetanide (BUMEX) 2 mg tablet, Take 1 Tab by mouth daily. , Disp: 90 Tab, Rfl: 0    warfarin (COUMADIN) 6 mg tablet, take 1 tablet by mouth once daily, Disp: 90 Tab, Rfl: 3    mometasone (NASONEX) 50 mcg/actuation nasal spray, 2 Sprays by Both Nostrils route daily. , Disp: 3 Container, Rfl: 3    carvedilol (COREG) 3.125 mg tablet, Take 1 Tab by mouth two (2) times daily (with meals). , Disp: 60 Tab, Rfl: 3    diclofenac (VOLTAREN) 1 % gel, Apply 4 g to affected area four (4) times daily. , Disp: 500 g, Rfl: 3    esomeprazole (NEXIUM) 40 mg capsule, take 1 capsule by mouth once daily, Disp: 90 Cap, Rfl: 1    aspirin 81 mg tablet, Take 81 mg by mouth.  , Disp: , Rfl:     multivitamins-minerals-lutein (CENTRUM SILVER) Tab, Take  by mouth.  , Disp: , Rfl:     calcium carbonate (CALTREX) 600 mg (1,500 mg) tablet, Take 600 mg by mouth two (2) times a day.  , Disp: , Rfl:   Visit Vitals    /78 (BP 1 Location: Right arm, BP Patient Position: Sitting)    Pulse (!) 104    Temp 97.6 °F (36.4 °C) (Oral)    Resp 16    Ht 4' 10\" (1.473 m)    Wt 99 lb 6.4 oz (45.1 kg)    SpO2 95%    BMI 20.77 kg/m2         Physical Exam   Constitutional: She appears well-developed and well-nourished. No distress. Cardiovascular: Normal rate and intact distal pulses. Exam reveals no gallop and no friction rub. Murmur heard. irregular   Skin: She is not diaphoretic. Vitals reviewed.   inr 2.3    ASSESSMENT and PLAN  a fib   rls  Plan  Increase requip to 1 mg bid  Recheck in 1 month

## 2018-07-10 NOTE — PROGRESS NOTES
Julieta Goode is a 80 y.o. female (: 1930) presenting to address:    Chief Complaint   Patient presents with    Coagulation disorder     pain scale 0/10       Vitals:    07/10/18 1056   BP: 158/78   Pulse: (!) 104   Resp: 16   Temp: 97.6 °F (36.4 °C)   TempSrc: Oral   SpO2: 95%   Weight: 99 lb 6.4 oz (45.1 kg)   Height: 4' 10\" (1.473 m)   PainSc:   0 - No pain       Hearing/Vision:   No exam data present    Learning Assessment:     Learning Assessment 3/6/2015   PRIMARY LEARNER Patient   HIGHEST LEVEL OF EDUCATION - PRIMARY LEARNER  DID NOT GRADUATE HIGH SCHOOL   BARRIERS PRIMARY LEARNER NONE   CO-LEARNER CAREGIVER No   PRIMARY LANGUAGE ENGLISH    NEED No   LEARNER PREFERENCE PRIMARY DEMONSTRATION   ANSWERED BY Patient   RELATIONSHIP SELF     Depression Screening:     PHQ over the last two weeks 7/10/2018   Little interest or pleasure in doing things Not at all   Feeling down, depressed or hopeless Not at all   Total Score PHQ 2 0     Fall Risk Assessment:     Fall Risk Assessment, last 12 mths 7/10/2018   Able to walk? Yes   Fall in past 12 months? No   Fall with injury? -   Number of falls in past 12 months -   Fall Risk Score -     Abuse Screening:     Abuse Screening Questionnaire 10/7/2015   Do you ever feel afraid of your partner? N   Are you in a relationship with someone who physically or mentally threatens you? N   Is it safe for you to go home? Y     Coordination of Care Questionaire:   1. Have you been to the ER, urgent care clinic since your last visit? Hospitalized since your last visit? NO    2. Have you seen or consulted any other health care providers outside of the [address] [place] [address] since your last visit? Include any pap smears or colon screening. Yes Cardiology    Advanced Directive:   1. Do you have an Advanced Directive? NO    2. Would you like information on Advanced Directives?  NO

## 2018-08-10 ENCOUNTER — OFFICE VISIT (OUTPATIENT)
Dept: FAMILY MEDICINE CLINIC | Age: 83
End: 2018-08-10

## 2018-08-10 VITALS
SYSTOLIC BLOOD PRESSURE: 147 MMHG | TEMPERATURE: 97.3 F | DIASTOLIC BLOOD PRESSURE: 69 MMHG | BODY MASS INDEX: 20.99 KG/M2 | HEIGHT: 58 IN | HEART RATE: 81 BPM | RESPIRATION RATE: 16 BRPM | WEIGHT: 100 LBS | OXYGEN SATURATION: 94 %

## 2018-08-10 DIAGNOSIS — I48.20 CHRONIC ATRIAL FIBRILLATION (HCC): Primary | ICD-10-CM

## 2018-08-10 DIAGNOSIS — G25.81 RESTLESS LEGS SYNDROME: ICD-10-CM

## 2018-08-10 DIAGNOSIS — Z79.01 CHRONIC ANTICOAGULATION: ICD-10-CM

## 2018-08-10 LAB
INR BLD: 1.5
PT POC: NORMAL SECONDS
VALID INTERNAL CONTROL?: YES

## 2018-08-10 NOTE — MR AVS SNAPSHOT
[address] [place] [address]  [address] [facility] [address] [phone] Patient: Brittany Ratliff MRN: [id number] FIV:16/50/2832 Visit Information Date & Time Provider Department Dept. Phone Encounter #  
 8/10/2018  1:15 PM Nancy Gonzalez, 3 [facility] [id number] Upcoming Health Maintenance Date Due  
 FOOT EXAM Q1 12/25/1940 MICROALBUMIN Q1 12/25/1940 Bone Densitometry (Dexa) Screening 12/25/1995 Influenza Age 5 to Adult 8/1/2018 EYE EXAM RETINAL OR DILATED Q1 8/10/2018 MEDICARE YEARLY EXAM 9/21/2018 HEMOGLOBIN A1C Q6M 10/3/2018 LIPID PANEL Q1 1/20/2019 GLAUCOMA SCREENING Q2Y 8/10/2019 DTaP/Tdap/Td series (2 - Td) 9/19/2026 Allergies as of 8/10/2018  Review Complete On: 8/10/2018 By: Nancy Gonzalez MD  
 No Known Allergies Current Immunizations  Reviewed on 8/12/2015 Name Date Influenza High Dose Vaccine PF 9/20/2017 11:01 AM, 9/19/2016 12:12 PM  
 Influenza Vaccine 10/24/2014 11:37 AM, 11/12/2013 11:04 AM, 7/1/2008 Influenza Vaccine PF 9/5/2016 Pneumococcal Conjugate (PCV-13) 9/19/2016 12:13 PM  
 Pneumococcal Polysaccharide (PPSV-23) 9/5/2016, 11/26/2014 10:15 AM, 7/1/2008 Tdap 9/19/2016 12:12 PM, 8/12/2015 11:18 AM  
  
 Not reviewed this visit You Were Diagnosed With   
  
 Codes Comments Chronic atrial fibrillation (HCC)    -  Primary ICD-10-CM: R12.9 ICD-9-CM: 427.31 Chronic anticoagulation     ICD-10-CM: Z79.01 
ICD-9-CM: V58.61 Restless legs syndrome     ICD-10-CM: G25.81 ICD-9-CM: 333.94 Vitals BP Pulse Temp Resp Height(growth percentile) Weight(growth percentile) 147/69 (BP 1 Location: Left arm, BP Patient Position: Sitting) 81 97.3 °F (36.3 °C) (Oral) 16 4' 10\" (1.473 m) 100 lb (45.4 kg) SpO2 BMI OB Status Smoking Status 94% 20.9 kg/m2 Postmenopausal Former Smoker BMI and BSA Data Body Mass Index Body Surface Area  
 20.9 kg/m 2 1.36 m 2 Preferred Pharmacy Pharmacy Name Phone [facility] [address] [address] [phone] Your Updated Medication List  
  
   
This list is accurate as of 8/10/18  1:52 PM.  Always use your most recent med list.  
  
  
  
  
 alendronate 70 mg tablet Commonly known as:  FOSAMAX  
take 1 tablet by mouth every week WITH 2,000 UNIT VIT D  
  
 aspirin 81 mg tablet Take 81 mg by mouth. atorvastatin 20 mg tablet Commonly known as:  LIPITOR  
take 1 tablet by mouth once daily  
  
 bumetanide 2 mg tablet Commonly known as:  [place] Take 1 Tab by mouth daily. calcium carbonate 600 mg calcium (1,500 mg) tablet Commonly known as:  Abimael Monty Take 600 mg by mouth two (2) times a day. carvedilol 3.125 mg tablet Commonly known as:  Cleatis Crescencio Take 1 Tab by mouth two (2) times daily (with meals). CENTRUM SILVER Tab tablet Generic drug:  multivitamins-minerals-lutein Take  by mouth. diclofenac 1 % Gel Commonly known as:  VOLTAREN Apply 4 g to affected area four (4) times daily. esomeprazole 40 mg capsule Commonly known as:  NEXIUM  
take 1 capsule by mouth once daily  
  
 lisinopril 10 mg tablet Commonly known as:  Kristyn Escort Take 1 Tab by mouth daily. mometasone 50 mcg/actuation nasal spray Commonly known as:  NASONEX  
2 Sprays by Both Nostrils route daily. rOPINIRole 0.5 mg tablet Commonly known as:  REQUIP  
take 1 tablet by mouth twice a day * warfarin 6 mg tablet Commonly known as:  COUMADIN  
take 1 tablet by mouth once daily * warfarin 1 mg tablet Commonly known as:  COUMADIN Take 1 every Saturday and Sunday * Notice: This list has 2 medication(s) that are the same as other medications prescribed for you.  Read the directions carefully, and ask your doctor or other care provider to review them with you. We Performed the Following AMB POC PT/INR [75064 CPT(R)] Patient Instructions Continue warfarin 6 mg daily and take an additional 1 mg on Friday, Saturday and Sunday Take both rapinirole (Requip) tablets before bedtime Nurse visit for INR check in 1 week Office visit in 6 months, sooner with any problems Introducing [facility] & HEALTH SERVICES! John Iraheta introduces [employer] patient portal. Now you can access parts of your medical record, email your doctor's office, and request medication refills online. 1. In your internet browser, go to [email or web address] 2. Click on the First Time User? Click Here link in the Sign In box. You will see the New Member Sign Up page. 3. Enter your [employer] Access Code exactly as it appears below. You will not need to use this code after youve completed the sign-up process. If you do not sign up before the expiration date, you must request a new code. · [employer] Access Code: [email or web address] Expires: 10/8/2018 11:27 AM 
 
4. Enter the last four digits of your Social Security Number (xxxx) and Date of Birth (mm/dd/yyyy) as indicated and click Submit. You will be taken to the next sign-up page. 5. Create a [employer] ID. This will be your [employer] login ID and cannot be changed, so think of one that is secure and easy to remember. 6. Create a [employer] password. You can change your password at any time. 7. Enter your Password Reset Question and Answer. This can be used at a later time if you forget your password. 8. Enter your e-mail address. You will receive e-mail notification when new information is available in [address] 9. Click Sign Up. You can now view and download portions of your medical record. 10. Click the Download Summary menu link to download a portable copy of your medical information. If you have questions, please visit the Frequently Asked Questions section of the [employer] website. Remember, [employer] is NOT to be used for urgent needs. For medical emergencies, dial 911. Now available from your iPhone and Android! Please provide this summary of care documentation to your next provider. Your primary care clinician is listed as Melanie Flores. If you have any questions after today's visit, please call [phone].

## 2018-08-10 NOTE — PATIENT INSTRUCTIONS
Continue warfarin 6 mg daily and take an additional 1 mg on Friday, Saturday and Sunday  Take both rapinirole (Requip) tablets before bedtime  Nurse visit for INR check in 1 week  Office visit in 6 months, sooner with any problems

## 2018-08-10 NOTE — PROGRESS NOTES
HISTORY OF PRESENT ILLNESS  Maycol Franklin is a 80 y.o. female. Irregular Heart Beat    The history is provided by the patient and medical records. This is a chronic problem. 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 Pertinent negatives include no fever, no chest pain and no orthopnea. Patient Active Problem List   Diagnosis Code    History of heart valve replacement Z95.2    Essential hypertension, benign I10    Other hyperlipidemia E78.4    Atrial fibrillation ([address] I48.91    Osteoarthritis of knee M17.10    Osteoporosis M81.0    History of total knee replacement Z96.659    Acute congestive heart failure (HCC) I50.9    Cardiomyopathy ([address] I42.9    Refractory anemia ([facility]) D46.4    Prediabetes R73.03    CKD (chronic kidney disease) stage 3, GFR 30-59 ml/min N18.3    Controlled type 2 diabetes mellitus without complication, without long-term current use of insulin ([facility]) E11.9    Chronic anticoagulation Z79.01       Current Outpatient Prescriptions:     atorvastatin (LIPITOR) 20 mg tablet, take 1 tablet by mouth once daily, Disp: 90 Tab, Rfl: 3    rOPINIRole (REQUIP) 0.5 mg tablet, take 1 tablet by mouth twice a day, Disp: 180 Tab, Rfl: 3    warfarin (COUMADIN) 1 mg tablet, Take 1 every Saturday and Sunday, Disp: 30 Tab, Rfl: 0    alendronate (FOSAMAX) 70 mg tablet, take 1 tablet by mouth every week WITH 2,000 UNIT VIT D, Disp: 12 Tab, Rfl: 3    lisinopril (PRINIVIL, ZESTRIL) 10 mg tablet, Take 1 Tab by mouth daily. , Disp: 90 Tab, Rfl: 1    bumetanide (BUMEX) 2 mg tablet, Take 1 Tab by mouth daily. , Disp: 90 Tab, Rfl: 0    warfarin (COUMADIN) 6 mg tablet, take 1 tablet by mouth once daily, Disp: 90 Tab, Rfl: 3    mometasone (NASONEX) 50 mcg/actuation nasal spray, 2 Sprays by Both Nostrils route daily. , Disp: 3 Container, Rfl: 3    carvedilol (COREG) 3.125 mg tablet, Take 1 Tab by mouth two (2) times daily (with meals). , Disp: 60 Tab, Rfl: 3    diclofenac (VOLTAREN) 1 % gel, Apply 4 g to affected area four (4) times daily. , Disp: 500 g, Rfl: 3    esomeprazole (NEXIUM) 40 mg capsule, take 1 capsule by mouth once daily, Disp: 90 Cap, Rfl: 1    aspirin 81 mg tablet, Take 81 mg by mouth.  , Disp: , Rfl:     multivitamins-minerals-lutein (CENTRUM SILVER) Tab, Take  by mouth.  , Disp: , Rfl:     calcium carbonate (CALTREX) 600 mg (1,500 mg) tablet, Take 600 mg by mouth two (2) times a day.  , Disp: , Rfl:     No Known Allergies      Review of Systems   Constitutional: Positive for malaise/fatigue (worked hard yesterday including trimming trees). Negative for fever and weight loss. Respiratory: Positive for cough (minimal). Chest feels a little congested   Cardiovascular: Positive for palpitations (occasional). Negative for chest pain, orthopnea and leg swelling. Neurological:        Awakened at night by restless leg symptoms     Visit Vitals    /69 (BP 1 Location: Left arm, BP Patient Position: Sitting)    Pulse 81    Temp 97.3 °F (36.3 °C) (Oral)    Resp 16    Ht 4' 10\" (1.473 m)    Wt 100 lb (45.4 kg)    SpO2 94%    BMI 20.9 kg/m2       Physical Exam   Constitutional: She is oriented to person, place, and time. She appears well-developed and well-nourished. HENT:   Head: Normocephalic. Eyes: Conjunctivae and EOM are normal.   Neck: Neck supple. Cardiovascular: Normal rate and normal heart sounds. irregular rhythym   Pulmonary/Chest: Effort normal and breath sounds normal.   Musculoskeletal: She exhibits no edema. Neurological: She is alert and oriented to person, place, and time. Skin: Skin is warm and dry. Psychiatric: She has a normal mood and affect. Her behavior is normal.   Nursing note and vitals reviewed. ASSESSMENT and PLAN    ICD-10-CM ICD-9-CM    1. Chronic atrial fibrillation (HCC) I48.2 427.31 AMB POC PT/INR   2.  Chronic anticoagulation Z79.01 V58.61 AMB POC PT/INR 3. Restless legs syndrome G25.81 333.94    Continue warfarin 6 mg daily and take an additional 1 mg on Friday, Saturday and Sunday  Take both rapinirole (Requip) tablets before bedtime  Nurse visit for INR check in 1 week  Office visit in 6 months, sooner with any problems

## 2018-08-10 NOTE — PROGRESS NOTES
Shawn Crews is a 80 y.o. female (: 1930) presenting to address:    Chief Complaint   Patient presents with    Coagulation disorder       Vitals:    08/10/18 1259   BP: 147/69   Pulse: 81   Resp: 16   Temp: 97.3 °F (36.3 °C)   TempSrc: Oral   SpO2: 94%   Weight: 100 lb (45.4 kg)   Height: 4' 10\" (1.473 m)   PainSc:   0 - No pain       Hearing/Vision:   No exam data present    Learning Assessment:     Learning Assessment 3/6/2015   PRIMARY LEARNER Patient   HIGHEST LEVEL OF EDUCATION - PRIMARY LEARNER  DID NOT GRADUATE HIGH SCHOOL   BARRIERS PRIMARY LEARNER NONE   CO-LEARNER CAREGIVER No   PRIMARY LANGUAGE ENGLISH    NEED No   LEARNER PREFERENCE PRIMARY DEMONSTRATION   ANSWERED BY Patient   RELATIONSHIP SELF     Depression Screening:     PHQ over the last two weeks 7/10/2018   Little interest or pleasure in doing things Not at all   Feeling down, depressed, irritable, or hopeless Not at all   Total Score PHQ 2 0     Fall Risk Assessment:     Fall Risk Assessment, last 12 mths 8/10/2018   Able to walk? Yes   Fall in past 12 months? No   Fall with injury? -   Number of falls in past 12 months -   Fall Risk Score -     Abuse Screening:     Abuse Screening Questionnaire 10/7/2015   Do you ever feel afraid of your partner? N   Are you in a relationship with someone who physically or mentally threatens you? N   Is it safe for you to go home? Y     Coordination of Care Questionaire:   1. Have you been to the ER, urgent care clinic since your last visit? Hospitalized since your last visit? NO    2. Have you seen or consulted any other health care providers outside of the [address] [place] [address] since your last visit? Include any pap smears or colon screening. NO    Advanced Directive:   1. Do you have an Advanced Directive? NO    2. Would you like information on Advanced Directives?  NO

## 2018-08-17 ENCOUNTER — CLINICAL SUPPORT (OUTPATIENT)
Dept: FAMILY MEDICINE CLINIC | Age: 83
End: 2018-08-17

## 2018-08-17 DIAGNOSIS — I48.20 CHRONIC ATRIAL FIBRILLATION (HCC): ICD-10-CM

## 2018-08-17 DIAGNOSIS — Z79.01 CHRONIC ANTICOAGULATION: Primary | ICD-10-CM

## 2018-08-17 LAB
INR BLD: 2.2
PT POC: 26.3 SECONDS
VALID INTERNAL CONTROL?: YES

## 2018-08-17 NOTE — PROGRESS NOTES
Julieta Goode is a 80 y.o. female who presents today for Anticoagulation monitoring. Indication: Atrial Fibrillation  INR Goal: 2.0-3.0. Current dose:  Coumadin 6mg Mon thru PETÄJÄVESI, 7mg fri thru sun   Missed Coumadin Doses:  None  Medication Changes:  no  Dietary Changes:  no    Symptoms: taking coumadin appropriately without any bleeding. Latest INRs:  Lab Results   Component Value Date/Time    INR 1.7 (H) 09/30/2014 09:47 AM    INR 3.1 (H) 01/03/2014 12:00 AM    INR POC 2.2 08/17/2018 11:22 AM    INR POC 1.5 08/10/2018 01:05 PM    INR POC 2.3 07/10/2018 11:26 AM    Prothrombin time 17.6 (H) 09/30/2014 09:47 AM    Prothrombin time 32.8 (H) 01/03/2014 12:00 AM        New Coumadin dose:.current treatment plan is effective, no change in therapy. Next check to be scheduled for  2 weeks.

## 2018-08-31 ENCOUNTER — CLINICAL SUPPORT (OUTPATIENT)
Dept: FAMILY MEDICINE CLINIC | Age: 83
End: 2018-08-31

## 2018-08-31 DIAGNOSIS — Z95.2 HISTORY OF HEART VALVE REPLACEMENT: ICD-10-CM

## 2018-08-31 DIAGNOSIS — Z79.01 CHRONIC ANTICOAGULATION: Primary | ICD-10-CM

## 2018-08-31 LAB
INR BLD: 1.9
PT POC: 23.1 SECONDS
VALID INTERNAL CONTROL?: YES

## 2018-08-31 NOTE — PROGRESS NOTES
Kellen Lopez is a 80 y.o. female who presents today for Anticoagulation monitoring. Indication: Atrial Fibrillation  INR Goal: 2.0-3.0. Current dose:  Coumadin 6mg Mon thru PETÄJÄVESI, 7mg fri thru sun  Missed Coumadin Doses:  None  Medication Changes:  no  Dietary Changes:  no    Symptoms: taking coumadin appropriately without any bleeding. Latest INRs:  Lab Results   Component Value Date/Time    INR 1.7 (H) 09/30/2014 09:47 AM    INR 3.1 (H) 01/03/2014 12:00 AM    INR POC 1.9 08/31/2018 11:05 AM    INR POC 2.2 08/17/2018 11:22 AM    INR POC 1.5 08/10/2018 01:05 PM    Prothrombin time 17.6 (H) 09/30/2014 09:47 AM    Prothrombin time 32.8 (H) 01/03/2014 12:00 AM        New Coumadin dose:.current treatment plan is effective, no change in therapy, the following changes are made - Coumadin 6mg tuesday, wednesday, Thursday and friday-Monday 7mg . Next check to be scheduled for  2 weeks.

## 2018-09-25 ENCOUNTER — OFFICE VISIT (OUTPATIENT)
Dept: FAMILY MEDICINE CLINIC | Age: 83
End: 2018-09-25

## 2018-09-25 VITALS
DIASTOLIC BLOOD PRESSURE: 68 MMHG | TEMPERATURE: 98.1 F | WEIGHT: 99 LBS | RESPIRATION RATE: 16 BRPM | SYSTOLIC BLOOD PRESSURE: 134 MMHG | OXYGEN SATURATION: 98 % | BODY MASS INDEX: 20.78 KG/M2 | HEIGHT: 58 IN | HEART RATE: 72 BPM

## 2018-09-25 DIAGNOSIS — I48.20 CHRONIC ATRIAL FIBRILLATION (HCC): ICD-10-CM

## 2018-09-25 DIAGNOSIS — Z23 ENCOUNTER FOR IMMUNIZATION: ICD-10-CM

## 2018-09-25 DIAGNOSIS — Z79.01 CHRONIC ANTICOAGULATION: Primary | ICD-10-CM

## 2018-09-25 DIAGNOSIS — K21.9 GASTROESOPHAGEAL REFLUX DISEASE, ESOPHAGITIS PRESENCE NOT SPECIFIED: ICD-10-CM

## 2018-09-25 LAB
INR BLD: 2.5
PT POC: 29.9 SECONDS
VALID INTERNAL CONTROL?: YES

## 2018-09-25 RX ORDER — WARFARIN 1 MG/1
TABLET ORAL
Qty: 30 TAB | Refills: 5 | Status: SHIPPED | OUTPATIENT
Start: 2018-09-25 | End: 2019-05-06

## 2018-09-25 RX ORDER — CARVEDILOL 6.25 MG/1
TABLET ORAL 2 TIMES DAILY WITH MEALS
COMMUNITY
End: 2019-04-05 | Stop reason: ALTCHOICE

## 2018-09-25 RX ORDER — ESOMEPRAZOLE MAGNESIUM 40 MG/1
CAPSULE, DELAYED RELEASE ORAL
Qty: 90 CAP | Refills: 1 | Status: SHIPPED | OUTPATIENT
Start: 2018-09-25 | End: 2019-03-22 | Stop reason: SDUPTHER

## 2018-09-25 RX ORDER — LISINOPRIL 20 MG/1
TABLET ORAL DAILY
COMMUNITY
End: 2019-05-06

## 2018-09-25 NOTE — PROGRESS NOTES
Ramo Whitley is a 80 y.o. female here for f/u Siva Cory Whitley is a 80 y.o. female (: 1930) presenting to address: Chief Complaint Patient presents with  
 Other  
  pt reports needing a handicap form filled out for a sticker  Anticoagulation INR check Vitals:  
 18 1045 BP: 134/68 Pulse: 72 Resp: 16 SpO2: 98% Weight: 99 lb (44.9 kg) Height: 4' 10\" (1.473 m) PainSc:   2 PainLoc: Generalized Hearing/Vision: No exam data present Learning Assessment:  
 
Learning Assessment 3/6/2015 PRIMARY LEARNER Patient HIGHEST LEVEL OF EDUCATION - PRIMARY LEARNER  DID NOT GRADUATE HIGH SCHOOL  
BARRIERS PRIMARY LEARNER NONE  
CO-LEARNER CAREGIVER No  
PRIMARY LANGUAGE ENGLISH  NEED No  
LEARNER PREFERENCE PRIMARY DEMONSTRATION  
ANSWERED BY Patient RELATIONSHIP SELF Depression Screening: PHQ over the last two weeks 2018 Little interest or pleasure in doing things Not at all Feeling down, depressed, irritable, or hopeless Not at all Total Score PHQ 2 0 Fall Risk Assessment:  
 
Fall Risk Assessment, last 12 mths 2018 Able to walk? Yes Fall in past 12 months? No  
Fall with injury? -  
Number of falls in past 12 months - Fall Risk Score -  
 
Abuse Screening:  
 
Abuse Screening Questionnaire 10/7/2015 Do you ever feel afraid of your partner? Abel Ser Are you in a relationship with someone who physically or mentally threatens you? Abel Ser Is it safe for you to go home? Martha Oliveira Coordination of Care Questionaire: 1. Have you been to the ER, urgent care clinic since your last visit? Hospitalized since your last visit? NO 
 
2. Have you seen or consulted any other health care providers outside of the [facility] since your last visit? Include any pap smears or colon screening. NO Advanced Directive: 1. Do you have an Advanced Directive? NO 
 
2. Would you like information on Advanced Directives?  NO

## 2018-09-25 NOTE — MR AVS SNAPSHOT
[address] [place] [address] [address] [facility] [address] [phone] Patient: Cornelius Billy MRN: [id number] PQF:37/62/4485 Visit Information Date & Time Provider Department Dept. Phone Encounter #  
 9/25/2018 11:00 AM Corinne Jarek, [employer] [phone] [id number] Upcoming Health Maintenance Date Due  
 FOOT EXAM Q1 12/25/1940 MICROALBUMIN Q1 12/25/1940 Shingrix Vaccine Age 50> (1 of 2) 12/25/1980 Bone Densitometry (Dexa) Screening 12/25/1995 Influenza Age 5 to Adult 8/1/2018 EYE EXAM RETINAL OR DILATED Q1 8/10/2018 MEDICARE YEARLY EXAM 9/21/2018 HEMOGLOBIN A1C Q6M 10/3/2018 LIPID PANEL Q1 1/20/2019 GLAUCOMA SCREENING Q2Y 8/10/2019 DTaP/Tdap/Td series (2 - Td) 9/19/2026 Allergies as of 9/25/2018  Review Complete On: 9/25/2018 By: Larisa Davis LPN No Known Allergies Current Immunizations  Reviewed on 8/12/2015 Name Date Influenza High Dose Vaccine PF 9/20/2017 11:01 AM, 9/19/2016 12:12 PM  
 Influenza Vaccine 10/24/2014 11:37 AM, 11/12/2013 11:04 AM, 7/1/2008 Influenza Vaccine (Tri) Adjuvanted 9/25/2018 Influenza Vaccine PF 9/5/2016 Pneumococcal Conjugate (PCV-13) 9/19/2016 12:13 PM  
 Pneumococcal Polysaccharide (PPSV-23) 9/5/2016, 11/26/2014 10:15 AM, 7/1/2008 Tdap 9/19/2016 12:12 PM, 8/12/2015 11:18 AM  
  
 Not reviewed this visit You Were Diagnosed With   
  
 Codes Comments Chronic anticoagulation    -  Primary ICD-10-CM: Z79.01 
ICD-9-CM: V58.61 Chronic atrial fibrillation (HCC)     ICD-10-CM: E02.4 ICD-9-CM: 427.31 Encounter for immunization     ICD-10-CM: S01 ICD-9-CM: V03.89 Gastroesophageal reflux disease, esophagitis presence not specified     ICD-10-CM: K21.9 ICD-9-CM: 530.81 Vitals BP Pulse Temp Resp Height(growth percentile) Weight(growth percentile) 134/68 (BP 1 Location: Left arm, BP Patient Position: Sitting) 72 98.1 °F (36.7 °C) (Oral) 16 4' 10\" (1.473 m) 99 lb (44.9 kg) SpO2 BMI OB Status Smoking Status 98% 20.69 kg/m2 Postmenopausal Former Smoker Vitals History BMI and BSA Data Body Mass Index Body Surface Area  
 20.69 kg/m 2 1.36 m 2 Preferred Pharmacy Pharmacy Name Phone [id number] [address] [address] [place] [address] [phone] Your Updated Medication List  
  
   
This list is accurate as of 9/25/18 11:09 AM.  Always use your most recent med list.  
  
  
  
  
 alendronate 70 mg tablet Commonly known as:  FOSAMAX  
take 1 tablet by mouth every week WITH 2,000 UNIT VIT D  
  
 aspirin 81 mg tablet Take 81 mg by mouth. atorvastatin 20 mg tablet Commonly known as:  LIPITOR  
take 1 tablet by mouth once daily  
  
 bumetanide 2 mg tablet Commonly known as:  [place] Take 1 Tab by mouth daily. calcium carbonate 600 mg calcium (1,500 mg) tablet Commonly known as:  Abimael Monty Take 600 mg by mouth two (2) times a day. carvedilol 6.25 mg tablet Commonly known as:  Cleatis Crescencio Take  by mouth two (2) times daily (with meals). CENTRUM SILVER Tab tablet Generic drug:  multivitamins-minerals-lutein Take  by mouth. diclofenac 1 % Gel Commonly known as:  VOLTAREN Apply 4 g to affected area four (4) times daily. esomeprazole 40 mg capsule Commonly known as:  NEXIUM  
take 1 capsule by mouth once daily * lisinopril 20 mg tablet Commonly known as:  Kristyn Escort Take  by mouth daily. * lisinopril 10 mg tablet Commonly known as:  Kristyn Escort Take 1 Tab by mouth daily. mometasone 50 mcg/actuation nasal spray Commonly known as:  NASONEX  
2 Sprays by Both Nostrils route daily. rOPINIRole 0.5 mg tablet Commonly known as:  REQUIP  
take 1 tablet by mouth twice a day * warfarin 6 mg tablet Commonly known as:  COUMADIN  
take 1 tablet by mouth once daily * warfarin 1 mg tablet Commonly known as:  COUMADIN Take 1 every Friday, Saturday, Sunday and Monday * Notice: This list has 4 medication(s) that are the same as other medications prescribed for you. Read the directions carefully, and ask your doctor or other care provider to review them with you. Prescriptions Sent to Pharmacy Refills  
 warfarin (COUMADIN) 1 mg tablet 5 Sig: Take 1 every Friday, Saturday, Sunday and Monday Class: Normal  
 [facility] [facility] [address] [address] Ph #: [phone]  
 esomeprazole (NEXIUM) 40 mg capsule 1 Sig: take 1 capsule by mouth once daily Class: Normal  
 Pharmacy: [facility] [address] [address] [place] Ph #: [phone] We Performed the Following ADMIN INFLUENZA VIRUS VAC [ HCPCS] AMB POC PT/INR [44479 CPT(R)] INFLUENZA VACCINE INACTIVATED (IIV), SUBUNIT, ADJUVANTED, IM [id number] CPT(R)] Introducing [facility] & HEALTH SERVICES! Jovanna Jha introduces [employer] patient portal. Now you can access parts of your medical record, email your doctor's office, and request medication refills online. 1. In your internet browser, go to [email or web address] 2. Click on the First Time User? Click Here link in the Sign In box. You will see the New Member Sign Up page. 3. Enter your [employer] Access Code exactly as it appears below. You will not need to use this code after youve completed the sign-up process. If you do not sign up before the expiration date, you must request a new code. · [employer] Access Code: [email or web address] Expires: 10/8/2018 11:27 AM 
 
4. Enter the last four digits of your Social Security Number (xxxx) and Date of Birth (mm/dd/yyyy) as indicated and click Submit. You will be taken to the next sign-up page. 5. Create a [employer] ID. This will be your [employer] login ID and cannot be changed, so think of one that is secure and easy to remember. 6. Create a [employer] password. You can change your password at any time. 7. Enter your Password Reset Question and Answer. This can be used at a later time if you forget your password. 8. Enter your e-mail address. You will receive e-mail notification when new information is available in [address] 9. Click Sign Up. You can now view and download portions of your medical record. 10. Click the Download Summary menu link to download a portable copy of your medical information. If you have questions, please visit the Frequently Asked Questions section of the [employer] website. Remember, [employer] is NOT to be used for urgent needs. For medical emergencies, dial 911. Now available from your iPhone and Android! Please provide this summary of care documentation to your next provider. Your primary care clinician is listed as Brady Garnica. If you have any questions after today's visit, please call [phone].

## 2018-09-25 NOTE — PROGRESS NOTES
HISTORY OF PRESENT ILLNESS Jessie Bradshaw is a 80 y.o. female. Other The history is provided by the patient and medical records. Pertinent negatives include no chest pain and no shortness of breath. Anticoagulation Pertinent negatives include no chest pain and no shortness of breath. Requests Disabled Parking Application completion Patient Active Problem List  
Diagnosis Code  History of heart valve replacement Z95.2  Essential hypertension, benign I10  
 Other hyperlipidemia E78.4  Atrial fibrillation (HCC) I48.91  
 Osteoarthritis of knee M17.10  
 Osteoporosis M81.0  History of total knee replacement Z96.659  Acute congestive heart failure (HCC) I50.9  Cardiomyopathy ([facility] [address] I42.9  Refractory anemia ([facility]) D46.4  Prediabetes R73.03  
 CKD (chronic kidney disease) stage 3, GFR 30-59 ml/min N18.3  Controlled type 2 diabetes mellitus without complication, without long-term current use of insulin ([facility]) E11.9  Chronic anticoagulation Z79.01  
 
 
Current Outpatient Prescriptions:  
  lisinopril (PRINIVIL, ZESTRIL) 20 mg tablet, Take  by mouth daily. , Disp: , Rfl:  
  carvedilol (COREG) 6.25 mg tablet, Take  by mouth two (2) times daily (with meals). , Disp: , Rfl:  
  bumetanide (BUMEX) 2 mg tablet, Take 1 Tab by mouth daily. , Disp: 90 Tab, Rfl: 1 
  atorvastatin (LIPITOR) 20 mg tablet, take 1 tablet by mouth once daily, Disp: 90 Tab, Rfl: 3 
  rOPINIRole (REQUIP) 0.5 mg tablet, take 1 tablet by mouth twice a day, Disp: 180 Tab, Rfl: 3 
  warfarin (COUMADIN) 1 mg tablet, Take 1 every Saturday and Sunday, Disp: 30 Tab, Rfl: 0 
  alendronate (FOSAMAX) 70 mg tablet, take 1 tablet by mouth every week WITH 2,000 UNIT VIT D, Disp: 12 Tab, Rfl: 3 
  lisinopril (PRINIVIL, ZESTRIL) 10 mg tablet, Take 1 Tab by mouth daily. , Disp: 90 Tab, Rfl: 1 
  warfarin (COUMADIN) 6 mg tablet, take 1 tablet by mouth once daily, Disp: 90 Tab, Rfl: 3   mometasone (NASONEX) 50 mcg/actuation nasal spray, 2 Sprays by Both Nostrils route daily. , Disp: 3 Container, Rfl: 3 
  diclofenac (VOLTAREN) 1 % gel, Apply 4 g to affected area four (4) times daily. , Disp: 500 g, Rfl: 3 
  esomeprazole (NEXIUM) 40 mg capsule, take 1 capsule by mouth once daily, Disp: 90 Cap, Rfl: 1 
  aspirin 81 mg tablet, Take 81 mg by mouth.  , Disp: , Rfl:  
  multivitamins-minerals-lutein (CENTRUM SILVER) Tab, Take  by mouth.  , Disp: , Rfl:  
  calcium carbonate (CALTREX) 600 mg (1,500 mg) tablet, Take 600 mg by mouth two (2) times a day.  , Disp: , Rfl:  
 
No Known Allergies Review of Systems Constitutional: Negative for fever and weight loss. Respiratory: Negative for shortness of breath. Cardiovascular: Negative for chest pain and palpitations. Gastrointestinal: Negative for blood in stool. Genitourinary: Negative for hematuria. Musculoskeletal: Positive for back pain (chronic) and joint pain (knee pain chronic). Neurological: Negative for dizziness. Visit Vitals  /68 (BP 1 Location: Left arm, BP Patient Position: Sitting)  Pulse 72  Temp 98.1 °F (36.7 °C) (Oral)  Resp 16  
 Ht 4' 10\" (1.473 m)  Wt 99 lb (44.9 kg)  SpO2 98%  BMI 20.69 kg/m2 Physical Exam  
Constitutional: She is oriented to person, place, and time. She appears well-developed and well-nourished. HENT:  
Head: Normocephalic. Eyes: Conjunctivae and EOM are normal.  
Neck: Neck supple. Cardiovascular: Normal rate and normal heart sounds. Irregular rhythm Pulmonary/Chest: Effort normal and breath sounds normal.  
Musculoskeletal: She exhibits no edema. Neurological: She is alert and oriented to person, place, and time. Skin: Skin is warm and dry. Psychiatric: She has a normal mood and affect. Her behavior is normal.  
Nursing note and vitals reviewed. Results for Vinnie Lung (MRN [id number]) as of 9/25/2018 12:41 Ref. Range 7/10/2018 11:26 8/10/2018 13:05 8/17/2018 11:22 8/31/2018 11:05 9/25/2018 10:52 INR Unknown 2.3 1.5 2.2 1.9 2.5 ASSESSMENT and PLAN 
  ICD-10-CM ICD-9-CM 1. Chronic anticoagulation Z79.01 V58.61 AMB POC PT/INR  
   warfarin (COUMADIN) 1 mg tablet 2. Chronic atrial fibrillation (HCC) I48.2 427.31 warfarin (COUMADIN) 1 mg tablet 3. Encounter for immunization Z23 V03.89 INFLUENZA VACCINE INACTIVATED (IIV), SUBUNIT, ADJUVANTED, IM  
   ADMIN INFLUENZA VIRUS VAC 4. Gastroesophageal reflux disease, esophagitis presence not specified K21.9 530.81 Current treatment plan is effective, no change in therapy, the following changes are made - Coumadin 6mg tuesday, wednesday, Thursday and friday-Monday 7mg . 
 Nurse visit INR, 1 month

## 2018-10-25 ENCOUNTER — OFFICE VISIT (OUTPATIENT)
Dept: FAMILY MEDICINE CLINIC | Age: 83
End: 2018-10-25

## 2018-10-25 VITALS
OXYGEN SATURATION: 99 % | TEMPERATURE: 97.2 F | HEIGHT: 58 IN | WEIGHT: 98.4 LBS | RESPIRATION RATE: 16 BRPM | BODY MASS INDEX: 20.65 KG/M2 | SYSTOLIC BLOOD PRESSURE: 144 MMHG | DIASTOLIC BLOOD PRESSURE: 75 MMHG | HEART RATE: 99 BPM

## 2018-10-25 DIAGNOSIS — Z79.01 CHRONIC ANTICOAGULATION: Primary | ICD-10-CM

## 2018-10-25 DIAGNOSIS — M75.41 IMPINGEMENT SYNDROME OF RIGHT SHOULDER: ICD-10-CM

## 2018-10-25 DIAGNOSIS — I48.20 CHRONIC ATRIAL FIBRILLATION (HCC): ICD-10-CM

## 2018-10-25 LAB
INR BLD: 2.4
PT POC: 28.9 SECONDS
VALID INTERNAL CONTROL?: YES

## 2018-10-25 RX ORDER — SPIRONOLACTONE 25 MG/1
TABLET ORAL DAILY
COMMUNITY
End: 2020-03-02

## 2018-10-25 RX ORDER — CHOLECALCIFEROL TAB 125 MCG (5000 UNIT) 125 MCG
TAB ORAL DAILY
COMMUNITY
End: 2020-07-20

## 2018-10-25 NOTE — PROGRESS NOTES
Mindi Haddad is a 80 y.o. female here for INR check. Mindi Haddad is a 80 y.o. female (: 1930) presenting to address: Chief Complaint Patient presents with  Follow-up Here for follow up and INR check. Already recieved flu vaccine. Vitals:  
 10/25/18 1109 BP: 144/75 Pulse: 99 Resp: 16 Temp: 97.2 °F (36.2 °C) TempSrc: Oral  
SpO2: 99% Weight: 98 lb 6.4 oz (44.6 kg) Height: 4' 10\" (1.473 m) Hearing/Vision: No exam data present Learning Assessment:  
 
Learning Assessment 3/6/2015 PRIMARY LEARNER Patient HIGHEST LEVEL OF EDUCATION - PRIMARY LEARNER  DID NOT GRADUATE HIGH SCHOOL  
BARRIERS PRIMARY LEARNER NONE  
CO-LEARNER CAREGIVER No  
PRIMARY LANGUAGE ENGLISH  NEED No  
LEARNER PREFERENCE PRIMARY DEMONSTRATION  
ANSWERED BY Patient RELATIONSHIP SELF Depression Screening: PHQ over the last two weeks 10/25/2018 Little interest or pleasure in doing things Not at all Feeling down, depressed, irritable, or hopeless Not at all Total Score PHQ 2 0 Fall Risk Assessment:  
 
Fall Risk Assessment, last 12 mths 10/25/2018 Able to walk? Yes Fall in past 12 months? No  
Fall with injury? -  
Number of falls in past 12 months - Fall Risk Score -  
 
Abuse Screening:  
 
Abuse Screening Questionnaire 10/7/2015 Do you ever feel afraid of your partner? Nael Brine Are you in a relationship with someone who physically or mentally threatens you? Nael Brine Is it safe for you to go home? Maryam Hernandes Coordination of Care Questionaire: 1. Have you been to the ER, urgent care clinic since your last visit? Hospitalized since your last visit? NO 
 
2. Have you seen or consulted any other health care providers outside of the [address] [place] [address] since your last visit? Include any pap smears or colon screening. NO Advanced Directive: 1. Do you have an Advanced Directive? NO 
 
2. Would you like information on Advanced Directives?  NO

## 2018-10-25 NOTE — PATIENT INSTRUCTIONS
Continue current medications. Follow exercise instructions Return for nurse visit INR in 1 month, sooner with any problems. Rotator Cuff: Exercises Your Care Instructions Here are some examples of typical rehabilitation exercises for your condition. Start each exercise slowly. Ease off the exercise if you start to have pain. Your doctor or physical therapist will tell you when you can start these exercises and which ones will work best for you. How to do the exercises Pendulum swing 1. Hold on to a table or the back of a chair with your good arm. Then bend forward a little and let your sore arm hang straight down. This exercise does not use the arm muscles. Rather, use your legs and your hips to create movement that makes your arm swing freely. 2. Use the movement from your hips and legs to guide the slightly swinging arm back and forth like a pendulum (or elephant trunk). Then guide it in circles that start small (about the size of a dinner plate). Make the circles a bit larger each day, as your pain allows. 3. Do this exercise for 5 minutes, 5 to 7 times each day. 4. As you have less pain, try bending over a little farther to do this exercise. This will increase the amount of movement at your shoulder. Posterior stretching exercise 1. Hold the elbow of your injured arm with your other hand. 2. Use your hand to pull your injured arm gently up and across your body. You will feel a gentle stretch across the back of your injured shoulder. 3. Hold for at least 15 to 30 seconds. Then slowly lower your arm. 4. Repeat 2 to 4 times. Up-the-back stretch 1. Put your hand in your back pocket. Let it rest there to stretch your shoulder. 2. With your other hand, hold your injured arm (palm outward) behind your back by the wrist. Pull your arm up gently to stretch your shoulder. 3. Next, put a towel over your other shoulder.  Put the hand of your injured arm behind your back. Now hold the back end of the towel. With the other hand, hold the front end of the towel in front of your body. Pull gently on the front end of the towel. This will bring your hand farther up your back to stretch your shoulder. Overhead stretch 1. Standing about an arm's length away, grasp onto a solid surface. You could use a countertop, a doorknob, or the back of a sturdy chair. 2. With your knees slightly bent, bend forward with your arms straight. Lower your upper body, and let your shoulders stretch. 3. As your shoulders are able to stretch farther, you may need to take a step or two backward. 4. Hold for at least 15 to 30 seconds. Then stand up and relax. If you had stepped back during your stretch, step forward so you can keep your hands on the solid surface. 5. Repeat 2 to 4 times. Shoulder flexion (lying down) 1. Lie on your back, holding a wand with both hands. Your palms should face down as you hold the wand. 2. Keeping your elbows straight, slowly raise your arms over your head. Raise them until you feel a stretch in your shoulders, upper back, and chest. 
3. Hold for 15 to 30 seconds. 4. Repeat 2 to 4 times. Shoulder rotation (lying down) 1. Lie on your back. Hold a wand with both hands with your elbows bent and palms up. 2. Keep your elbows close to your body, and move the wand across your body toward the sore arm. 3. Hold for 8 to 12 seconds. 4. Repeat 2 to 4 times. Wall climbing (to the side) 1. Stand with your side to a wall so that your fingers can just touch it at an angle about 30 degrees toward the front of your body. 2. Walk the fingers of your injured arm up the wall as high as pain permits. Try not to shrug your shoulder up toward your ear as you move your arm up. 3. Hold that position for a count of at least 15 to 20. 
4. Walk your fingers back down to the starting position. 5. Repeat at least 2 to 4 times. Try to reach higher each time. Wall climbing (to the front) 1. Face a wall, and stand so your fingers can just touch it. 2. Keeping your shoulder down, walk the fingers of your injured arm up the wall as high as pain permits. (Don't shrug your shoulder up toward your ear.) 3. Hold your arm in that position for at least 15 to 30 seconds. 4. Slowly walk your fingers back down to where you started. 5. Repeat at least 2 to 4 times. Try to reach higher each time. Shoulder blade squeeze 1. Stand with your arms at your sides, and squeeze your shoulder blades together. Do not raise your shoulders up as you squeeze. 2. Hold 6 seconds. 3. Repeat 8 to 12 times. Scapular exercise: Arm reach 1. Lie flat on your back. This exercise is a very slight motion that starts with your arms raised (elbows straight, arms straight). 2. From this position, reach higher toward the nir or ceiling. Keep your elbows straight. All motion should be from your shoulder blade only. 3. Relax your arms back to where you started. 4. Repeat 8 to 12 times. Arm raise to the side 1. Slowly raise your injured arm to the side, with your thumb facing up. Raise your arm 60 degrees at the most (shoulder level is 90 degrees). 2. Hold the position for 3 to 5 seconds. Then lower your arm back to your side. If you need to, bring your \"good\" arm across your body and place it under the elbow as you lower your injured arm. Use your good arm to keep your injured arm from dropping down too fast. 
3. Repeat 8 to 12 times. 4. When you first start out, don't hold any extra weight in your hand. As you get stronger, you may use a 1-pound to 2-pound dumbbell or a small can of food. Shoulder flexor and extensor exercise 1. Push forward (flex): Stand facing a wall or doorjamb, about 6 inches or less back. Hold your injured arm against your body.  Make a closed fist with your thumb on top. Then gently push your hand forward into the wall with about 25% to 50% of your strength. Don't let your body move backward as you push. Hold for about 6 seconds. Relax for a few seconds. Repeat 8 to 12 times. 2. Push backward (extend): Stand with your back flat against a wall. Your upper arm should be against the wall, with your elbow bent 90 degrees (your hand straight ahead). Push your elbow gently back against the wall with about 25% to 50% of your strength. Don't let your body move forward as you push. Hold for about 6 seconds. Relax for a few seconds. Repeat 8 to 12 times. Scapular exercise: Wall push-ups 1. Stand facing a wall, about 12 inches to 18 inches away. 2. Place your hands on the wall at shoulder height. 3. Slowly bend your elbows and bring your face to the wall. Keep your back and hips straight. 4. Push back to where you started. 5. Repeat 8 to 12 times. 6. When you can do this exercise against a wall comfortably, you can try it against a counter. You can then slowly progress to the end of a couch, then to a sturdy chair, and finally to the floor. Scapular exercise: Retraction 1. Put the band around a solid object at about waist level. (A bedpost will work well.) Each hand should hold an end of the band. 2. With your elbows at your sides and bent to 90 degrees, pull the band back. Your shoulder blades should move toward each other. Then move your arms back where you started. 3. Repeat 8 to 12 times. 4. If you have good range of motion in your shoulders, try this exercise with your arms lifted out to the sides. Keep your elbows at a 90-degree angle. Raise the elastic band up to about shoulder level. Pull the band back to move your shoulder blades toward each other. Then move your arms back where you started. Internal rotator strengthening exercise 1. Start by tying a piece of elastic exercise material to a doorknob.  You can use surgical tubing or Thera-Band. 2. Stand or sit with your shoulder relaxed and your elbow bent 90 degrees. Your upper arm should rest comfortably against your side. Squeeze a rolled towel between your elbow and your body for comfort. This will help keep your arm at your side. 3. Hold one end of the elastic band in the hand of the painful arm. 4. Slowly rotate your forearm toward your body until it touches your belly. Slowly move it back to where you started. 5. Keep your elbow and upper arm firmly tucked against the towel roll or at your side. 6. Repeat 8 to 12 times. External rotator strengthening exercise 1. Start by tying a piece of elastic exercise material to a doorknob. You can use surgical tubing or Thera-Band. (You may also hold one end of the band in each hand.) 2. Stand or sit with your shoulder relaxed and your elbow bent 90 degrees. Your upper arm should rest comfortably against your side. Squeeze a rolled towel between your elbow and your body for comfort. This will help keep your arm at your side. 3. Hold one end of the elastic band with the hand of the painful arm. 4. Start with your forearm across your belly. Slowly rotate the forearm out away from your body. Keep your elbow and upper arm tucked against the towel roll or the side of your body until you begin to feel tightness in your shoulder. Slowly move your arm back to where you started. 5. Repeat 8 to 12 times. Follow-up care is a key part of your treatment and safety. Be sure to make and go to all appointments, and call your doctor if you are having problems. It's also a good idea to know your test results and keep a list of the medicines you take. Where can you learn more? Go to http://navin-isidro.info/. Enter Naila Treviño in the search box to learn more about \"Rotator Cuff: Exercises. \" Current as of: November 29, 2017 Content Version: 11.8 © 6999-7528 [employer]. Care instructions adapted under license by [employer] (which disclaims liability or warranty for this information). If you have questions about a medical condition or this instruction, always ask your healthcare professional. [address] any warranty or liability for your use of this information.

## 2018-10-25 NOTE — PROGRESS NOTES
HISTORY OF PRESENT ILLNESS Mindi Haddad is a 80 y.o. female. Follow-up The history is provided by the patient and medical records. Pertinent negatives include no chest pain. Patient Active Problem List  
Diagnosis Code  History of heart valve replacement Z95.2  Essential hypertension, benign I10  
 Other hyperlipidemia E78.49  
 Atrial fibrillation (HCC) I48.91  
 Osteoarthritis of knee M17.10  
 Osteoporosis M81.0  History of total knee replacement Z96.659  Acute congestive heart failure (HCC) I50.9  Cardiomyopathy ([address] I42.9  Refractory anemia ([facility]) D46.4  Prediabetes R73.03  
 CKD (chronic kidney disease) stage 3, GFR 30-59 ml/min ([facility]) N18.3  Controlled type 2 diabetes mellitus without complication, without long-term current use of insulin ([facility]) E11.9  Chronic anticoagulation Z79.01  
 Gastroesophageal reflux disease K21.9 Current Outpatient Medications:  
  cholecalciferol, VITAMIN D3, (VITAMIN D3) 5,000 unit tab tablet, Take  by mouth daily. , Disp: , Rfl:  
  spironolactone (ALDACTONE) 25 mg tablet, Take  by mouth daily. , Disp: , Rfl:  
  lisinopril (PRINIVIL, ZESTRIL) 20 mg tablet, Take  by mouth daily. , Disp: , Rfl:  
  carvedilol (COREG) 6.25 mg tablet, Take  by mouth two (2) times daily (with meals). , Disp: , Rfl:  
  warfarin (COUMADIN) 1 mg tablet, Take 1 every Friday, Saturday, Sunday and Monday, Disp: 30 Tab, Rfl: 5 
  esomeprazole (NEXIUM) 40 mg capsule, take 1 capsule by mouth once daily, Disp: 90 Cap, Rfl: 1 
  bumetanide (BUMEX) 2 mg tablet, Take 1 Tab by mouth daily. , Disp: 90 Tab, Rfl: 1 
  atorvastatin (LIPITOR) 20 mg tablet, take 1 tablet by mouth once daily, Disp: 90 Tab, Rfl: 3 
  rOPINIRole (REQUIP) 0.5 mg tablet, take 1 tablet by mouth twice a day, Disp: 180 Tab, Rfl: 3 
  alendronate (FOSAMAX) 70 mg tablet, take 1 tablet by mouth every week WITH 2,000 UNIT VIT D, Disp: 12 Tab, Rfl: 3   warfarin (COUMADIN) 6 mg tablet, take 1 tablet by mouth once daily, Disp: 90 Tab, Rfl: 3 
  mometasone (NASONEX) 50 mcg/actuation nasal spray, 2 Sprays by Both Nostrils route daily. , Disp: 3 Container, Rfl: 3 
  aspirin 81 mg tablet, Take 81 mg by mouth.  , Disp: , Rfl:  
  multivitamins-minerals-lutein (CENTRUM SILVER) Tab, Take  by mouth.  , Disp: , Rfl:  
  calcium carbonate (CALTREX) 600 mg (1,500 mg) tablet, Take 600 mg by mouth two (2) times a day.  , Disp: , Rfl:  
 
No Known Allergies Review of Systems Constitutional: Negative for fever and weight loss. Cardiovascular: Negative for chest pain and palpitations. Musculoskeletal: Positive for joint pain (right shoulder pain since  fell against shoulder 2-3 months ago). Visit Vitals /75 (BP 1 Location: Left arm, BP Patient Position: Sitting) Pulse 99 Temp 97.2 °F (36.2 °C) (Oral) Resp 16 Ht 4' 10\" (1.473 m) Wt 98 lb 6.4 oz (44.6 kg) SpO2 99% BMI 20.57 kg/m² Physical Exam  
Constitutional: She is oriented to person, place, and time. She appears well-developed and well-nourished. HENT:  
Head: Normocephalic. Eyes: Conjunctivae and EOM are normal.  
Neck: Neck supple. Cardiovascular: Normal rate, regular rhythm and normal heart sounds. Pulmonary/Chest: Effort normal and breath sounds normal.  
Musculoskeletal: She exhibits no edema. Right shoulder non tender with full range of motion with pain on full extension, abduction/internal rotation. Neurological: She is alert and oriented to person, place, and time. Skin: Skin is warm and dry. Psychiatric: She has a normal mood and affect. Her behavior is normal.  
Nursing note and vitals reviewed. Results for Vergia Lowers (MRN [id number]) as of 10/25/2018 17:40 Ref. Range 8/17/2018 11:22 8/31/2018 11:05 9/25/2018 10:52 10/25/2018 11:36 INR Unknown 2.2 1.9 2.5 2.4 ASSESSMENT and PLAN 
  ICD-10-CM ICD-9-CM 1. Chronic anticoagulation Z79.01 V58.61 AMB POC PT/INR  
2. Chronic atrial fibrillation (HCC) I48.2 427.31 AMB POC PT/INR  
3. Impingement syndrome of right shoulder M75.41 726.2 Continue current medications. Follow exercise instructions Return for nurse visit INR in 1 month, sooner with any problems.

## 2018-11-05 ENCOUNTER — TELEPHONE (OUTPATIENT)
Dept: FAMILY MEDICINE CLINIC | Age: 83
End: 2018-11-05

## 2018-11-05 NOTE — TELEPHONE ENCOUNTER
Hydrocodone-acetometiphine- Norco 5. Patient was seen in [facility] ER on 11/3/18 for back pain. She was given a prescription from them however, the pharmacy will not fill it. She is requesting that Dr Hawa Plummer write her a prescription.

## 2018-11-06 NOTE — TELEPHONE ENCOUNTER
Spoke with patient and informed her that we could not fill this request. Pt gave verbal understanding of decision.

## 2018-11-28 ENCOUNTER — CLINICAL SUPPORT (OUTPATIENT)
Dept: FAMILY MEDICINE CLINIC | Age: 83
End: 2018-11-28

## 2018-11-28 DIAGNOSIS — Z79.01 CHRONIC ANTICOAGULATION: Primary | ICD-10-CM

## 2018-11-28 DIAGNOSIS — I48.20 CHRONIC ATRIAL FIBRILLATION (HCC): ICD-10-CM

## 2018-11-28 DIAGNOSIS — Z95.2 HISTORY OF HEART VALVE REPLACEMENT: ICD-10-CM

## 2018-11-28 LAB
INR BLD: 2.5
PT POC: 30.3 SECONDS
VALID INTERNAL CONTROL?: YES

## 2018-11-30 ENCOUNTER — PATIENT OUTREACH (OUTPATIENT)
Dept: FAMILY MEDICINE CLINIC | Age: 83
End: 2018-11-30

## 2018-12-04 DIAGNOSIS — I48.91 ATRIAL FIBRILLATION, UNSPECIFIED TYPE (HCC): Primary | ICD-10-CM

## 2018-12-04 RX ORDER — WARFARIN 6 MG/1
TABLET ORAL
Qty: 90 TAB | Refills: 3 | Status: SHIPPED | OUTPATIENT
Start: 2018-12-04 | End: 2019-05-06

## 2019-01-09 ENCOUNTER — CLINICAL SUPPORT (OUTPATIENT)
Dept: FAMILY MEDICINE CLINIC | Age: 84
End: 2019-01-09

## 2019-01-09 DIAGNOSIS — I48.20 CHRONIC ATRIAL FIBRILLATION (HCC): ICD-10-CM

## 2019-01-09 DIAGNOSIS — Z79.01 CHRONIC ANTICOAGULATION: Primary | ICD-10-CM

## 2019-01-09 LAB
INR BLD: NORMAL
PT POC: 25.8 SECONDS (ref 2.2–?)
VALID INTERNAL CONTROL?: YES

## 2019-01-09 NOTE — PROGRESS NOTES
Kei Mayen is a 80 y.o. female who presents today for Anticoagulation monitoring. Indication: Atrial Fibrillation INR Goal: 2.0-3.0. Current dose:  Coumadin 6 mg Tues. - Thurs. And 7 mg Fri.- Mon. Missed Coumadin Doses:  None Medication Changes:  no 
Dietary Changes:  no 
 
Symptoms: taking coumadin appropriately without any bleeding. Latest INRs: 
Lab Results Component Value Date/Time INR 1.7 (H) 09/30/2014 09:47 AM  
 INR 3.1 (H) 01/03/2014 12:00 AM  
 INR POC 2.5 11/28/2018 11:09 AM  
 INR POC 2.4 10/25/2018 11:36 AM  
 INR POC 2.5 09/25/2018 10:52 AM  
 Prothrombin time 17.6 (H) 09/30/2014 09:47 AM  
 Prothrombin time 32.8 (H) 01/03/2014 12:00 AM  
 
  
New Coumadin dose:.current treatment plan is effective, no change in therapy. Next check to be scheduled for  1 month.

## 2019-02-06 ENCOUNTER — CLINICAL SUPPORT (OUTPATIENT)
Dept: FAMILY MEDICINE CLINIC | Age: 84
End: 2019-02-06

## 2019-02-06 DIAGNOSIS — I48.20 CHRONIC ATRIAL FIBRILLATION (HCC): ICD-10-CM

## 2019-02-06 DIAGNOSIS — Z79.01 CHRONIC ANTICOAGULATION: Primary | ICD-10-CM

## 2019-02-06 LAB
INR BLD: 2.9
PT POC: 34.8 SECONDS
VALID INTERNAL CONTROL?: YES

## 2019-02-06 NOTE — PROGRESS NOTES
Aryan Guzman is a 80 y.o. female who presents today for Anticoagulation monitoring. Indication: Atrial Fibrillation  INR Goal: 2.0-3.0. Current dose:  Coumadin 6 mg daily Tues- Thurs and 7 mg Fri-Mon.  Missed Coumadin Doses:  None  Medication Changes:  no  Dietary Changes:  no    Symptoms: taking coumadin appropriately without any bleeding. Latest INRs:  Lab Results   Component Value Date/Time    INR 1.7 (H) 09/30/2014 09:47 AM    INR 3.1 (H) 01/03/2014 12:00 AM    INR POC 2.9 02/06/2019 11:07 AM    INR POC 2.5 11/28/2018 11:09 AM    INR POC 2.4 10/25/2018 11:36 AM    Prothrombin time 17.6 (H) 09/30/2014 09:47 AM    Prothrombin time 32.8 (H) 01/03/2014 12:00 AM        New Coumadin dose:.current treatment plan is effective, no change in therapy. Next check to be scheduled for  1 month.

## 2019-03-06 ENCOUNTER — CLINICAL SUPPORT (OUTPATIENT)
Dept: FAMILY MEDICINE CLINIC | Age: 84
End: 2019-03-06

## 2019-03-06 ENCOUNTER — OFFICE VISIT (OUTPATIENT)
Dept: FAMILY MEDICINE CLINIC | Age: 84
End: 2019-03-06

## 2019-03-06 VITALS
WEIGHT: 98 LBS | HEART RATE: 60 BPM | HEIGHT: 58 IN | SYSTOLIC BLOOD PRESSURE: 128 MMHG | OXYGEN SATURATION: 98 % | TEMPERATURE: 97.8 F | DIASTOLIC BLOOD PRESSURE: 70 MMHG | RESPIRATION RATE: 16 BRPM | BODY MASS INDEX: 20.57 KG/M2

## 2019-03-06 DIAGNOSIS — D46.4 REFRACTORY ANEMIA (HCC): ICD-10-CM

## 2019-03-06 DIAGNOSIS — R73.02 IMPAIRED GLUCOSE TOLERANCE: ICD-10-CM

## 2019-03-06 DIAGNOSIS — Z79.01 CHRONIC ANTICOAGULATION: ICD-10-CM

## 2019-03-06 DIAGNOSIS — I48.20 CHRONIC ATRIAL FIBRILLATION (HCC): Primary | ICD-10-CM

## 2019-03-06 PROBLEM — E11.9 CONTROLLED TYPE 2 DIABETES MELLITUS WITHOUT COMPLICATION, WITHOUT LONG-TERM CURRENT USE OF INSULIN (HCC): Status: RESOLVED | Noted: 2018-07-10 | Resolved: 2019-03-06

## 2019-03-06 LAB
HBA1C MFR BLD HPLC: 5.8 %
INR BLD: 2.5
PT POC: 30 SECONDS
VALID INTERNAL CONTROL?: YES

## 2019-03-06 NOTE — PATIENT INSTRUCTIONS
Continue current medications. Return with nurse visit for INR in 1 month     Atrial Fibrillation: Care Instructions  Your Care Instructions    Atrial fibrillation is an irregular and often fast heartbeat. Treating this condition is important for several reasons. It can cause blood clots, which can travel from your heart to your brain and cause a stroke. If you have a fast heartbeat, you may feel lightheaded, dizzy, and weak. An irregular heartbeat can also increase your risk for heart failure. Atrial fibrillation is often the result of another heart condition, such as high blood pressure or coronary artery disease. Making changes to improve your heart condition will help you stay healthy and active. Follow-up care is a key part of your treatment and safety. Be sure to make and go to all appointments, and call your doctor if you are having problems. It's also a good idea to know your test results and keep a list of the medicines you take. How can you care for yourself at home? Medicines    · Take your medicines exactly as prescribed. Call your doctor if you think you are having a problem with your medicine. You will get more details on the specific medicines your doctor prescribes.     · If your doctor has given you a blood thinner to prevent a stroke, be sure you get instructions about how to take your medicine safely. Blood thinners can cause serious bleeding problems.     · Do not take any vitamins, over-the-counter drugs, or herbal products without talking to your doctor first.    Lifestyle changes    · Do not smoke. Smoking can increase your chance of a stroke and heart attack. If you need help quitting, talk to your doctor about stop-smoking programs and medicines. These can increase your chances of quitting for good.     · Eat a heart-healthy diet.     · Stay at a healthy weight. Lose weight if you need to.     · Limit alcohol to 2 drinks a day for men and 1 drink a day for women.  Too much alcohol can cause health problems.     · Avoid colds and flu. Get a pneumococcal vaccine shot. If you have had one before, ask your doctor whether you need another dose. Get a flu shot every year. If you must be around people with colds or flu, wash your hands often. Activity    · If your doctor recommends it, get more exercise. Walking is a good choice. Bit by bit, increase the amount you walk every day. Try for at least 30 minutes on most days of the week. You also may want to swim, bike, or do other activities. Your doctor may suggest that you join a cardiac rehabilitation program so that you can have help increasing your physical activity safely.     · Start light exercise if your doctor says it is okay. Even a small amount will help you get stronger, have more energy, and manage stress. Walking is an easy way to get exercise. Start out by walking a little more than you did in the hospital. Gradually increase the amount you walk.     · When you exercise, watch for signs that your heart is working too hard. You are pushing too hard if you cannot talk while you are exercising. If you become short of breath or dizzy or have chest pain, sit down and rest immediately.     · Check your pulse regularly. Place two fingers on the artery at the palm side of your wrist, in line with your thumb. If your heartbeat seems uneven or fast, talk to your doctor. When should you call for help? Call 911 anytime you think you may need emergency care. For example, call if:    · You have symptoms of a heart attack. These may include:  ? Chest pain or pressure, or a strange feeling in the chest.  ? Sweating. ? Shortness of breath. ? Nausea or vomiting. ? Pain, pressure, or a strange feeling in the back, neck, jaw, or upper belly or in one or both shoulders or arms. ? Lightheadedness or sudden weakness. ? A fast or irregular heartbeat. After you call 911, the  may tell you to chew 1 adult-strength or 2 to 4 low-dose aspirin.  Wait for an ambulance. Do not try to drive yourself.     · You have symptoms of a stroke. These may include:  ? Sudden numbness, tingling, weakness, or loss of movement in your face, arm, or leg, especially on only one side of your body. ? Sudden vision changes. ? Sudden trouble speaking. ? Sudden confusion or trouble understanding simple statements. ? Sudden problems with walking or balance. ? A sudden, severe headache that is different from past headaches.     · You passed out (lost consciousness).    Call your doctor now or seek immediate medical care if:    · You have new or increased shortness of breath.     · You feel dizzy or lightheaded, or you feel like you may faint.     · Your heart rate becomes irregular.     · You can feel your heart flutter in your chest or skip heartbeats. Tell your doctor if these symptoms are new or worse.    Watch closely for changes in your health, and be sure to contact your doctor if you have any problems. Where can you learn more? Go to http://navin-isidro.info/. Enter U020 in the search box to learn more about \"Atrial Fibrillation: Care Instructions. \"  Current as of: July 22, 2018  Content Version: 11.9  © 5512-6055 [employer]. Care instructions adapted under license by [employer] (which disclaims liability or warranty for this information). If you have questions about a medical condition or this instruction, always ask your healthcare professional. [place] [address] any warranty or liability for your use of this information.

## 2019-03-06 NOTE — PROGRESS NOTES
Rosie Thompson is a 80 y.o. female (: 1930) presenting to address:    Chief Complaint   Patient presents with    Anticoagulation     Here for PT/INR check and to discuss weight problems. Vitals:    19 1102   BP: 128/70   Pulse: 60   Resp: 16   Temp: 97.8 °F (36.6 °C)   TempSrc: Oral   SpO2: 98%   Weight: 98 lb (44.5 kg)   Height: 4' 10\" (1.473 m)   PainSc:   0 - No pain       Hearing/Vision:   No exam data present    Learning Assessment:     Learning Assessment 3/6/2015   PRIMARY LEARNER Patient   HIGHEST LEVEL OF EDUCATION - PRIMARY LEARNER  DID NOT GRADUATE HIGH SCHOOL   BARRIERS PRIMARY LEARNER NONE   CO-LEARNER CAREGIVER No   PRIMARY LANGUAGE ENGLISH    NEED No   LEARNER PREFERENCE PRIMARY DEMONSTRATION   ANSWERED BY Patient   RELATIONSHIP SELF     Depression Screening:     3 most recent PHQ Screens 3/6/2019   Little interest or pleasure in doing things Not at all   Feeling down, depressed, irritable, or hopeless Not at all   Total Score PHQ 2 0     Fall Risk Assessment:     Fall Risk Assessment, last 12 mths 3/6/2019   Able to walk? Yes   Fall in past 12 months? No   Fall with injury? -   Number of falls in past 12 months -   Fall Risk Score -     Abuse Screening:     Abuse Screening Questionnaire 10/7/2015   Do you ever feel afraid of your partner? N   Are you in a relationship with someone who physically or mentally threatens you? N   Is it safe for you to go home? Y     Coordination of Care Questionaire:   1. Have you been to the ER, urgent care clinic since your last visit? Hospitalized since your last visit? NO    2. Have you seen or consulted any other health care providers outside of the [address] [place] [address] since your last visit? Include any pap smears or colon screening. NO    Advanced Directive:   1. Do you have an Advanced Directive? NO    2. Would you like information on Advanced Directives?  NO    Rosie Thompson is a 80 y.o. female who presents today for Anticoagulation monitoring. Indication: Atrial Fibrillation  INR Goal: 2.0-3.0. Current dose:  Coumadin 6 mg Tues. - Thurs. 7 mg Fri.- Mon. Missed Coumadin Doses:  None  Medication Changes:  no  Dietary Changes:  no    Symptoms: taking coumadin appropriately without any bleeding. Latest INRs:  Lab Results   Component Value Date/Time    INR 1.7 (H) 09/30/2014 09:47 AM    INR 3.1 (H) 01/03/2014 12:00 AM    INR POC 2.5 03/06/2019 11:10 AM    INR POC 2.9 02/06/2019 11:07 AM    INR POC 2.5 11/28/2018 11:09 AM    Prothrombin time 17.6 (H) 09/30/2014 09:47 AM    Prothrombin time 32.8 (H) 01/03/2014 12:00 AM        New Coumadin dose:.current treatment plan is effective, no change in therapy. Next check to be scheduled for  1 month.

## 2019-03-06 NOTE — PROGRESS NOTES
HISTORY OF PRESENT ILLNESS  Yu Price is a 80 y.o. female. Diabetes   The history is provided by the patient and medical records. This is a chronic problem. Pertinent negatives include no chest pain, no abdominal pain and no shortness of breath. Patient Active Problem List   Diagnosis Code    History of heart valve replacement Z95.2    Essential hypertension, benign I10    Other hyperlipidemia E78.49    Atrial fibrillation ([facility] I48.91    Osteoarthritis of knee M17.10    Osteoporosis M81.0    History of total knee replacement Z96.659    Acute congestive heart failure (HCC) I50.9    Cardiomy[facility] I42.9    Refractory anemia ([facility]) D46.4    Prediabetes R73.03    CKD (chronic kidney disease) stage 3, GFR 30-59 ml/min ([facility]) N18.3    Controlled type 2 diabetes mellitus without complication, without long-term current use of insulin ([facility]) E11.9    Chronic anticoagulation Z79.01    Gastroesophageal reflux disease K21.9       Current Outpatient Medications:     warfarin (COUMADIN) 6 mg tablet, take 1 tablet by mouth once daily, Disp: 90 Tab, Rfl: 3    cholecalciferol, VITAMIN D3, (VITAMIN D3) 5,000 unit tab tablet, Take  by mouth daily. , Disp: , Rfl:     spironolactone (ALDACTONE) 25 mg tablet, Take  by mouth daily. , Disp: , Rfl:     lisinopril (PRINIVIL, ZESTRIL) 20 mg tablet, Take  by mouth daily. , Disp: , Rfl:     carvedilol (COREG) 6.25 mg tablet, Take  by mouth two (2) times daily (with meals). , Disp: , Rfl:     warfarin (COUMADIN) 1 mg tablet, Take 1 every Friday, Saturday, Sunday and Monday, Disp: 30 Tab, Rfl: 5    esomeprazole (NEXIUM) 40 mg capsule, take 1 capsule by mouth once daily, Disp: 90 Cap, Rfl: 1    bumetanide (BUMEX) 2 mg tablet, Take 1 Tab by mouth daily. , Disp: 90 Tab, Rfl: 1    atorvastatin (LIPITOR) 20 mg tablet, take 1 tablet by mouth once daily, Disp: 90 Tab, Rfl: 3    rOPINIRole (REQUIP) 0.5 mg tablet, take 1 tablet by mouth twice a day, Disp: 180 Tab, Rfl: 3    alendronate (FOSAMAX) 70 mg tablet, take 1 tablet by mouth every week WITH 2,000 UNIT VIT D, Disp: 12 Tab, Rfl: 3    mometasone (NASONEX) 50 mcg/actuation nasal spray, 2 Sprays by Both Nostrils route daily. , Disp: 3 Container, Rfl: 3    aspirin 81 mg tablet, Take 81 mg by mouth.  , Disp: , Rfl:     multivitamins-minerals-lutein (CENTRUM SILVER) Tab, Take  by mouth.  , Disp: , Rfl:     calcium carbonate (CALTREX) 600 mg (1,500 mg) tablet, Take 600 mg by mouth two (2) times a day.  , Disp: , Rfl:     No Known Allergies      Review of Systems   Constitutional: Positive for weight loss (concerned about losing weight). Negative for fever. Respiratory: Negative for shortness of breath. Cardiovascular: Negative for chest pain and palpitations. Gastrointestinal: Negative for abdominal pain. Neurological: Negative for dizziness. Psychiatric/Behavioral: Negative for depression. Vitals 3/6/2019 10/25/2018 9/25/2018 8/10/2018 7/10/2018   Weight 98 lb 98 lb 6.4 oz 99 lb 100 lb      Vitals 7/10/2018 6/8/2018 5/1/2018   Weight 99 lb 6.4 oz 98 lb 99 lb 3.2 oz     Visit Vitals  /70 (BP 1 Location: Left arm, BP Patient Position: Sitting)   Pulse 60   Temp 97.8 °F (36.6 °C) (Oral)   Resp 16   Ht 4' 10\" (1.473 m)   Wt 98 lb (44.5 kg)   SpO2 98%   BMI 20.48 kg/m²       Physical Exam   Constitutional: She is oriented to person, place, and time. She appears well-developed and well-nourished. HENT:   Head: Normocephalic. Eyes: Conjunctivae and EOM are normal.   Neck: Neck supple. Cardiovascular: Normal rate and normal heart sounds. Irregular rythym cw atrial fibtillation   Pulmonary/Chest: Effort normal and breath sounds normal.   Musculoskeletal: She exhibits no edema. Neurological: She is alert and oriented to person, place, and time. Skin: Skin is warm and dry. Psychiatric: She has a normal mood and affect. Her behavior is normal.   Nursing note and vitals reviewed.     Results for LAMB-GORE, Young Moore (MRN [id number]) as of 3/6/2019 17:10   Ref. Range 11/28/2018 11:09 1/9/2019 10:24 2/6/2019 11:07 3/6/2019 11:10 3/6/2019 11:13   Hemoglobin A1c (POC) Latest Units: %     5.8   INR Unknown 2.5  2.9 2.5      ASSESSMENT and PLAN    ICD-10-CM ICD-9-CM    1. Chronic atrial fibrillation (HCC) I48.2 427.31 AMB POC PT/INR   2. Chronic anticoagulation Z79.01 V58.61 AMB POC PT/INR   3. Impaired glucose tolerance R73.02 790.22 AMB POC HEMOGLOBIN A1C   4. Refractory anemia (HCC) D46.4 238.72    Refractory anemia followed by hematology  Continue current medications.   Reassured that there is nothing to suggest abnormal weight loss

## 2019-03-22 RX ORDER — ESOMEPRAZOLE MAGNESIUM 40 MG/1
CAPSULE, DELAYED RELEASE ORAL
Qty: 90 CAP | Refills: 1 | Status: SHIPPED | OUTPATIENT
Start: 2019-03-22 | End: 2020-01-17

## 2019-04-05 ENCOUNTER — OFFICE VISIT (OUTPATIENT)
Dept: FAMILY MEDICINE CLINIC | Age: 84
End: 2019-04-05

## 2019-04-05 VITALS
HEART RATE: 69 BPM | WEIGHT: 98.6 LBS | RESPIRATION RATE: 16 BRPM | DIASTOLIC BLOOD PRESSURE: 58 MMHG | HEIGHT: 58 IN | BODY MASS INDEX: 20.7 KG/M2 | SYSTOLIC BLOOD PRESSURE: 145 MMHG | TEMPERATURE: 97 F | OXYGEN SATURATION: 95 %

## 2019-04-05 DIAGNOSIS — E55.9 VITAMIN D DEFICIENCY: ICD-10-CM

## 2019-04-05 DIAGNOSIS — R73.02 IMPAIRED GLUCOSE TOLERANCE: ICD-10-CM

## 2019-04-05 DIAGNOSIS — Z95.2 HISTORY OF HEART VALVE REPLACEMENT: ICD-10-CM

## 2019-04-05 DIAGNOSIS — N18.30 STAGE 3 CHRONIC KIDNEY DISEASE (HCC): ICD-10-CM

## 2019-04-05 DIAGNOSIS — D64.9 CHRONIC ANEMIA: ICD-10-CM

## 2019-04-05 DIAGNOSIS — I10 ESSENTIAL HYPERTENSION, BENIGN: ICD-10-CM

## 2019-04-05 DIAGNOSIS — Z00.00 ROUTINE GENERAL MEDICAL EXAMINATION AT A HEALTH CARE FACILITY: Primary | ICD-10-CM

## 2019-04-05 DIAGNOSIS — E78.5 HYPERLIPIDEMIA, UNSPECIFIED HYPERLIPIDEMIA TYPE: ICD-10-CM

## 2019-04-05 DIAGNOSIS — Z79.01 CHRONIC ANTICOAGULATION: ICD-10-CM

## 2019-04-05 LAB
INR BLD: 3.5
PT POC: 42.2 SECONDS
VALID INTERNAL CONTROL?: YES

## 2019-04-05 RX ORDER — METOPROLOL TARTRATE 25 MG/1
TABLET, FILM COATED ORAL 2 TIMES DAILY
COMMUNITY
End: 2019-05-06

## 2019-04-05 NOTE — PROGRESS NOTES
Juan Garsia is a 80 y.o. female (: 1930) presenting to address: Chief Complaint Patient presents with [facility] Annual Wellness Visit Vitals:  
 19 1101 BP: 145/58 Pulse: 69 Resp: 16 Temp: 97 °F (36.1 °C) TempSrc: Oral  
SpO2: 95% Weight: 98 lb 9.6 oz (44.7 kg) Height: 4' 10\" (1.473 m) Hearing/Vision:  
 
 Hearing Screening [address] Right ear: Fail 40 40  Fail Left ear: Fail 40 Fail  Fail Visual Acuity Screening Right eye Left eye Both eyes Without correction: 20/70 20/70 20/40 With correction:     
 
 
Learning Assessment:  
 
Learning Assessment 3/6/2015 PRIMARY LEARNER Patient HIGHEST LEVEL OF EDUCATION - PRIMARY LEARNER  DID NOT GRADUATE HIGH SCHOOL  
BARRIERS PRIMARY LEARNER NONE  
CO-LEARNER CAREGIVER No  
PRIMARY LANGUAGE ENGLISH  NEED No  
LEARNER PREFERENCE PRIMARY DEMONSTRATION  
ANSWERED BY Patient RELATIONSHIP SELF Depression Screening:  
 
3 most recent PHQ Screens 2019 Little interest or pleasure in doing things Not at all Feeling down, depressed, irritable, or hopeless Not at all Total Score PHQ 2 0 Fall Risk Assessment:  
 
Fall Risk Assessment, last 12 mths 2019 Able to walk? Yes Fall in past 12 months? No  
Fall with injury? -  
Number of falls in past 12 months - Fall Risk Score -  
 
Abuse Screening:  
 
Abuse Screening Questionnaire 10/7/2015 Do you ever feel afraid of your partner? Mort Leaver Are you in a relationship with someone who physically or mentally threatens you? Mort Leaver Is it safe for you to go home? Paulette Hays Coordination of Care Questionaire: 1. Have you been to the ER, urgent care clinic since your last visit? Hospitalized since your last visit? NO 
 
2. Have you seen or consulted any other health care providers outside of the [address] [place] [address] since your last visit?   Include any pap smears or colon screening. YES Dr. Noman Chan Advanced Directive: 1. Do you have an Advanced Directive? NO 
 
2. Would you like information on Advanced Directives? NO Ferny Mon is a 80 y.o. female who presents today for Anticoagulation monitoring. Indication: Atrial Fibrillation INR Goal: 2.0-3.0. Current dose:  Coumadin 6 mg Tuesday and 7 mg Friday-monday Missed Coumadin Doses:  None Medication Changes:  no 
Dietary Changes:  no 
 
Symptoms: taking coumadin appropriately without any bleeding. Latest INRs: 
Lab Results Component Value Date/Time INR 1.7 (H) 09/30/2014 09:47 AM  
 INR 3.1 (H) 01/03/2014 12:00 AM  
 INR POC 3.5 04/05/2019 11:17 AM  
 INR POC 2.5 03/06/2019 11:10 AM  
 INR POC 2.9 02/06/2019 11:07 AM  
 Prothrombin time 17.6 (H) 09/30/2014 09:47 AM  
 Prothrombin time 32.8 (H) 01/03/2014 12:00 AM  
 
  
New Coumadin dose:.current treatment plan is effective, no change in therapy. Next check to be scheduled for  1 month.

## 2019-04-05 NOTE — PROGRESS NOTES
HISTORY OF PRESENT ILLNESS Genny Chambers is a 80 y.o. female. Physical  
The history is provided by the patient and relative. Pertinent negatives include no chest pain, no abdominal pain, no headaches and no shortness of breath. Review of Systems Constitutional: Negative for chills, fever and weight loss. HENT: Negative for hearing loss. Eyes: Negative for blurred vision and double vision. Respiratory: Negative for cough, shortness of breath and wheezing. Cardiovascular: Positive for palpitations (sees cardiology). Negative for chest pain and leg swelling. Advised by her cardiologist to take metoprolol 25 bid instead of carvedilol x 2 weeks because of tachycardia Gastrointestinal: Negative for abdominal pain, constipation, diarrhea, heartburn, nausea and vomiting. Genitourinary: Negative for dysuria and urgency. Musculoskeletal: Positive for joint pain (ankles \"a little bit\"). Negative for myalgias. Skin: Negative for itching and rash. Neurological: Negative for dizziness, tingling, sensory change, focal weakness and headaches. Restless legs Endo/Heme/Allergies: Positive for environmental allergies (seasonal). Psychiatric/Behavioral: Negative for depression. The patient is not nervous/anxious and does not have insomnia. Physical Exam  
Constitutional: She is oriented to person, place, and time. She appears well-developed and well-nourished. HENT:  
Head: Normocephalic. Right Ear: Tympanic membrane and ear canal normal.  
Left Ear: Tympanic membrane and ear canal normal.  
Mouth/Throat: Oropharynx is clear and moist.  
Eyes: Pupils are equal, round, and reactive to light. Conjunctivae and EOM are normal.  
Neck: Neck supple. Cardiovascular: Normal rate, regular rhythm, normal heart sounds and intact distal pulses. Pulmonary/Chest: Effort normal and breath sounds normal.  
Abdominal: Soft. Bowel sounds are normal. She exhibits no mass.  There is no tenderness. Musculoskeletal: She exhibits no edema. Neurological: She is alert and oriented to person, place, and time. She has normal reflexes. Skin: Skin is warm and dry. Psychiatric: She has a normal mood and affect. Her behavior is normal.  
Nursing note and vitals reviewed. Results for Parviz Wong (MRN [id number]) as of 2019 17:49 Ref. Range 2019 11:07 3/6/2019 11:10 3/6/2019 11:13 2019 11:17 INR Unknown 2.9 2.5  3.5 ASSESSMENT and PLAN 
  ICD-10-CM ICD-9-CM 1. Routine general medical examination at a health care facility Z00.00 V70.0 2. Chronic anticoagulation Z79.01 V58.61 AMB POC PT/INR  
3. History of heart valve replacement Z95.2 V43.3 4. Stage 3 chronic kidney disease (HCC) N18.3 585.3 5. Chronic anemia D64.9 285.9 6. Essential hypertension, benign I10 401.1 7. Impaired glucose tolerance R73.02 790.22 Date of Service:  2019 Patient Name:  Bunny Hoover Patient :  1930 This is a Subsequent Medicare Annual Wellness Visit providing Personalized Prevention Plan Services (PPPS) (Performed 12 months after initial AWV and PPPS )  I have reviewed the patient's medical history in detail and updated the computerized patient record. History Past Medical History:  
Diagnosis Date  Congestive heart failure ([address]  Heart valve replaced  Hypercholesterolemia  Hypertension Past Surgical History:  
Procedure Laterality Date  CARDIAC SURG PROCEDURE UNLIST    
 heart valve  HX ORTHOPAEDIC    
 total knee  HX PACEMAKER  2016 Current Outpatient Medications Medication Sig Dispense Refill  esomeprazole (NEXIUM) 40 mg capsule take 1 capsule by mouth once daily 90 Cap 1  
 warfarin (COUMADIN) 6 mg tablet take 1 tablet by mouth once daily 90 Tab 3  cholecalciferol, VITAMIN D3, (VITAMIN D3) 5,000 unit tab tablet Take  by mouth daily.  spironolactone (ALDACTONE) 25 mg tablet Take  by mouth daily.  lisinopril (PRINIVIL, ZESTRIL) 20 mg tablet Take  by mouth daily.  carvedilol (COREG) 6.25 mg tablet Take  by mouth two (2) times daily (with meals).  warfarin (COUMADIN) 1 mg tablet Take 1 every Friday, Saturday, Sunday and Monday 30 Tab 5  
 bumetanide (BUMEX) 2 mg tablet Take 1 Tab by mouth daily. 90 Tab 1  
 atorvastatin (LIPITOR) 20 mg tablet take 1 tablet by mouth once daily 90 Tab 3  
 rOPINIRole (REQUIP) 0.5 mg tablet take 1 tablet by mouth twice a day 180 Tab 3  
 alendronate (FOSAMAX) 70 mg tablet take 1 tablet by mouth every week WITH 2,000 UNIT VIT D 12 Tab 3  
 mometasone (NASONEX) 50 mcg/actuation nasal spray 2 Sprays by Both Nostrils route daily. 3 Container 3  
 aspirin 81 mg tablet Take 81 mg by mouth.  multivitamins-minerals-lutein (CENTRUM SILVER) Tab Take  by mouth.  calcium carbonate (CALTREX) 600 mg (1,500 mg) tablet Take 600 mg by mouth two (2) times a day. No Known Allergies No family history on file. Social History Tobacco Use  Smoking status: Former Smoker  Smokeless tobacco: Never Used Substance Use Topics  Alcohol use: No  
 
Patient Active Problem List  
Diagnosis Code  History of heart valve replacement Z95.2  Essential hypertension, benign I10  
 Other hyperlipidemia E78.49  
 Atrial fibrillation (HCC) I48.91  
 Osteoarthritis of knee M17.10  
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  
 CKD (chronic kidney disease) stage 3, GFR 30-59 ml/min ([facility]) N18.3  Chronic anticoagulation Z79.01  
 Gastroesophageal reflux disease K21.9 Living situation:  
-- Lives with family -- Stairs in home no Diet, Lifestyle: generally follows a low sodium diet Exercise level: not active Depression Risk Factor Screening:  
 
3 most recent PHQ Screens 3/6/2019 Little interest or pleasure in doing things Not at all Feeling down, depressed, irritable, or hopeless Not at all Total Score PHQ 2 0 Alcohol Risk Factor Screening: You do not drink alcohol or very rarely. Functional Ability and Level of Safety:  
 
Hearing Loss Hearing is good. Activities of Daily Living Self-care. Requires assistance with: no ADLs Fall Risk Fall Risk Assessment, last 12 mths 3/6/2019 Able to walk? Yes Fall in past 12 months? No  
Fall with injury? -  
Number of falls in past 12 months - Fall Risk Score -  
 
Abuse Screen Patient is not abused Review of Systems Review of systems reported in the separate problem-oriented documentation. Physical Examination Physical exam reported in the separate problem-oriented documentation. VS:  
Visit Vitals /58 (BP 1 Location: Left arm, BP Patient Position: Sitting) Pulse 69 Temp 97 °F (36.1 °C) (Oral) Resp 16 Ht 4' 10\" (1.473 m) Wt 98 lb 9.6 oz (44.7 kg) SpO2 95% BMI 20.61 kg/m² Evaluation of Cognitive Function: 
Mood/affect:  happy Appearance: age appropriate and younger than stated age Family member/caregiver input: no 
 
Patient Care Team: 
Tad Gregory MD as PCP - General (Family Practice) Cosme Tyler RN as Ambulatory Care Navigator Advice/Counseling Education and counseling provided: 
Are appropriate based on today's review and evaluation Advance Directives counseling/discussion Assessment/Plan ICD-10-CM ICD-9-CM 1. Routine general medical examination at a health care facility Z00.00 V70.0 2. Chronic anticoagulation Z79.01 V58.61 AMB POC PT/INR  
   CBC WITH AUTOMATED DIFF 3. History of heart valve replacement Z95.2 V43.3 TSH 3RD GENERATION 4. Stage 3 chronic kidney disease (HCC) F03.9 688.6 METABOLIC PANEL, COMPREHENSIVE  
 5. Chronic anemia D64.9 285.9 CBC WITH AUTOMATED DIFF 6. Essential hypertension, benign I10 401.1 LIPID PANEL  
   METABOLIC PANEL, COMPREHENSIVE  
   LIPID PANEL  
   METABOLIC PANEL, COMPREHENSIVE URINALYSIS W/ RFLX MICROSCOPIC 7. Impaired glucose tolerance R73.02 790.22 HEMOGLOBIN A1C WITH EAG  
   HEMOGLOBIN A1C WITH EAG  
8. Vitamin D deficiency E55.9 268.9 VITAMIN D, 25 HYDROXY  
   VITAMIN D, 25 HYDROXY 9. Hyperlipidemia, unspecified hyperlipidemia type E78.5 272.4 TSH 3RD GENERATION  
   LIPID PANEL [place] Immunization History Administered Date(s) Administered  Influenza High Dose Vaccine PF 09/19/2016, 09/20/2017  Influenza Vaccine 07/01/2008, 11/12/2013, 10/24/2014  Influenza Vaccine (Tri) Adjuvanted 09/25/2018  Influenza Vaccine PF 09/05/2016  Pneumococcal Conjugate (PCV-13) 09/19/2016  Pneumococcal Polysaccharide (PPSV-23) 07/01/2008, 11/26/2014, 09/05/2016  Tdap 08/12/2015, 09/19/2016 Health Maintenance Review: 
Colonoscopy - N/A Mammogram - Spring 2018 Pap Smear - N/A Dexa Scan - ? Glaucoma Screen - yearly HZV - Recommended 5-year personal health care plan: 
Continue current medications, return for nurse visit with INR check in 1 month. Lab today or at earliest convenience further disposition pending lab results if indicated. Consider Shingix immunization at the pharmacy to decrease the likelihood of a shingles attack. Influenza immunization fall 2019. Mammogram in 1-2 years. Glaucoma screening at least every 2 years. Annual Medicare wellness evaluation in April 2020 And completed advanced directive form.  
 
 
 
Marvin Melendrez was provided a written 5-year personalized preventive services plan, which was included in her Elba An MD

## 2019-04-05 NOTE — PATIENT INSTRUCTIONS
5-year personal health care plan: 
Continue current medications, return for nurse visit with INR check in 1 month. Lab today or at earliest convenience further disposition pending lab results if indicated. Consider Shingix immunization at the pharmacy to decrease the likelihood of a shingles attack. Influenza immunization fall 2019. Mammogram in 1-2 years. Glaucoma screening at least every 2 years. Annual Medicare wellness evaluation in April 2020 And completed advanced directive form. Well Visit, Over 72: Care Instructions Your Care Instructions Physical exams can help you stay healthy. Your doctor has checked your overall health and may have suggested ways to take good care of yourself. He or she also may have recommended tests. At home, you can help prevent illness with healthy eating, regular exercise, and other steps. Follow-up care is a key part of your treatment and safety. Be sure to make and go to all appointments, and call your doctor if you are having problems. It's also a good idea to know your test results and keep a list of the medicines you take. How can you care for yourself at home? · Reach and stay at a healthy weight. This will lower your risk for many problems, such as obesity, diabetes, heart disease, and high blood pressure. · Get at least 30 minutes of exercise on most days of the week. Walking is a good choice. You also may want to do other activities, such as running, swimming, cycling, or playing tennis or team sports. · Do not smoke. Smoking can make health problems worse. If you need help quitting, talk to your doctor about stop-smoking programs and medicines. These can increase your chances of quitting for good. · Protect your skin from too much sun. When you're outdoors from 10 a.m. to 4 p.m., stay in the shade or cover up with clothing and a hat with a wide brim. Wear sunglasses that block UV rays.  Even when it's cloudy, put broad-spectrum sunscreen (SPF 30 or higher) on any exposed skin. · See a dentist one or two times a year for checkups and to have your teeth cleaned. · Wear a seat belt in the car. · Limit alcohol to 2 drinks a day for men and 1 drink a day for women. Too much alcohol can cause health problems. Follow your doctor's advice about when to have certain tests. These tests can spot problems early. For men and women · Cholesterol. Your doctor will tell you how often to have this done based on your overall health and other things that can increase your risk for heart attack and stroke. · Blood pressure. Have your blood pressure checked during a routine doctor visit. Your doctor will tell you how often to check your blood pressure based on your age, your blood pressure results, and other factors. · Diabetes. Ask your doctor whether you should have tests for diabetes. · Vision. Experts recommend that you have yearly exams for glaucoma and other age-related eye problems. · Hearing. Tell your doctor if you notice any change in your hearing. You can have tests to find out how well you hear. · Colon cancer tests. Keep having colon cancer tests as your doctor recommends. You can have one of several types of tests. · Heart attack and stroke risk. At least every 4 to 6 years, you should have your risk for heart attack and stroke assessed. Your doctor uses factors such as your age, blood pressure, cholesterol, and whether you smoke or have diabetes to show what your risk for a heart attack or stroke is over the next 10 years. · Osteoporosis. Talk to your doctor about whether you should have a bone density test to find out whether you have thinning bones. Also ask your doctor about whether you should take calcium and vitamin D supplements. For women · Pap test and pelvic exam. You may no longer need a Pap test. Talk with your doctor about whether to stop or continue to have Pap tests. · Breast exam and mammogram. Ask how often you should have a mammogram, which is an X-ray of your breasts. A mammogram can spot breast cancer before it can be felt and when it is easiest to treat. · Thyroid disease. Talk to your doctor about whether to have your thyroid checked as part of a regular physical exam. Women have an increased chance of a thyroid problem. For men · Prostate exam. Talk to your doctor about whether you should have a blood test (called a PSA test) for prostate cancer. Experts disagree on whether men should have this test. Some experts recommend that you discuss the benefits and risks of the test with your doctor. · Abdominal aortic aneurysm. Ask your doctor whether you should have a test to check for an aneurysm. You may need a test if you ever smoked or if your parent, brother, sister, or child has had an aneurysm. When should you call for help? Watch closely for changes in your health, and be sure to contact your doctor if you have any problems or symptoms that concern you. Where can you learn more? Go to http://navin-isidro.info/. Enter Z008 in the search box to learn more about \"Well Visit, Over 65: Care Instructions. \" Current as of: March 28, 2018 Content Version: 11.9 © 3869-1542 [employer]. Care instructions adapted under license by [employer] (which disclaims liability or warranty for this information). If you have questions about a medical condition or this instruction, always ask your healthcare professional. [address] any warranty or liability for your use of this information.

## 2019-04-12 RX ORDER — ROPINIROLE 0.5 MG/1
TABLET, FILM COATED ORAL
Qty: 180 TAB | Refills: 3 | Status: SHIPPED | OUTPATIENT
Start: 2019-04-12 | End: 2019-07-01 | Stop reason: SDUPTHER

## 2019-04-12 NOTE — TELEPHONE ENCOUNTER
Requested Prescriptions     Pending Prescriptions Disp Refills    rOPINIRole (REQUIP) 0.5 mg tablet 180 Tab 3     Patient's daughter calling to request refill on: 4/12/19      Remaining pills: 0  Last appt: 4/5/19  Next appt: 5/6/19     Pharmacy:  She does not want it sent to a pharmacy she wants to pick it up. Patient aware of 72 hour time frame. Ms Sohan Ramirez states that the patient called the pharmacy and us 2 days ago to request the medication. I did not see any electronic messages and I don't know if the pharmacy faxed us a request.     Pt's daughter is requesting to come to the office today to  the medication because she has been out for 2 days and her leg pain is very severe. I informed her that I could not guarantee that it would be ready today but I could see what I could do. Please advise.

## 2019-04-25 ENCOUNTER — TELEPHONE (OUTPATIENT)
Dept: FAMILY MEDICINE CLINIC | Age: 84
End: 2019-04-25

## 2019-04-25 NOTE — TELEPHONE ENCOUNTER
Patient daughter called and stated patient stool is black.  Should would like to know if she should go to the ER    Spoke with Dr. Juan Baer and he stated yes patient should go to ER

## 2019-05-06 ENCOUNTER — PATIENT OUTREACH (OUTPATIENT)
Dept: FAMILY MEDICINE CLINIC | Age: 84
End: 2019-05-06

## 2019-05-06 ENCOUNTER — TELEPHONE (OUTPATIENT)
Dept: FAMILY MEDICINE CLINIC | Age: 84
End: 2019-05-06

## 2019-05-06 PROBLEM — K92.2 UGIB (UPPER GASTROINTESTINAL BLEED): Status: ACTIVE | Noted: 2019-04-25

## 2019-05-06 RX ORDER — ISOSORBIDE MONONITRATE 30 MG/1
30 TABLET, EXTENDED RELEASE ORAL DAILY
COMMUNITY
Start: 2019-05-05

## 2019-05-06 RX ORDER — CARVEDILOL 6.25 MG/1
1 TABLET ORAL 2 TIMES DAILY WITH MEALS
Refills: 0 | COMMUNITY
Start: 2019-05-04 | End: 2020-03-02 | Stop reason: DRUGHIGH

## 2019-05-06 RX ORDER — WARFARIN 2 MG/1
6 TABLET ORAL DAILY
COMMUNITY
Start: 2019-05-04 | End: 2019-05-14 | Stop reason: SDUPTHER

## 2019-05-06 RX ORDER — ENOXAPARIN SODIUM 100 MG/ML
40 INJECTION SUBCUTANEOUS DAILY
COMMUNITY
Start: 2019-05-04 | End: 2019-05-14 | Stop reason: SDUPTHER

## 2019-05-06 NOTE — PROGRESS NOTES
[facility] Discharge Follow-Up Date/Time:  2019 2:49 PM 
 
Patient was admitted to [facility] on 2019 and discharged on 2019 for Acute UGIB. The physician discharge summary was available at the time of outreach. Patient was contacted within 1 business days of discharge. NN spoke to patient well over 55 minutes doing medication reconciliation. She said she was writing the medications down. Patient called back after NN spoke to her daughter  Mrs Forbes Fuelling over 25 minutes concerning medications. NN said that she would write out her medications for her can she send someone here to pick them up. Patient's Son came to  medication list. NN laminated 3 copies for her. Her ABELARDO appointment is for 5/10/2019 with Dr Tyorne Friedman @ 2:00 pm. 
 
 
Top Challenges reviewed with the provider INR Levels to be drawn by [place] Nurse Monday and Thursday Patient is on 6 mg coumadin as of now. INR goal 2.5-3 Method of communication with provider :chart routing Inpatient RRAT score: . High Risk   
      
 21 Total Score 21 Charlson Comorbidity Score (Age + Comorbid Conditions) Criteria that do not apply:  
 Has Seen PCP in Last 6 Months (Yes=3, No=0) . Living with Significant Other. Assisted Living. LTAC. SNF. or  
Rehab Patient Length of Stay (>5 days = 3) IP Visits Last 12 Months (1-3=4, 4=9, >4=11) Pt. Coverage (Medicare=5 , Medicaid, or Self-Pay=4) Was this a readmission? no  
Patient stated reason for the readmission: N/A Nurse Navigator (NN) contacted the Patient and Son by telephone to perform post hospital discharge assessment. Verified name and  with Patient and son as identifiers. Provided introduction to self, and explanation of the Nurse Navigator role. Reviewed discharge instructions and red flags with patient and son who verbalized understanding.  patient and son given an opportunity to ask questions and does not have any further questions or concerns at this time. The patient agrees to contact the PCP office for questions related to their healthcare. NN provided contact information for future reference. Disease Specific:   N/A Summary of patient's top problems: 
 
 
Home Health orders at discharge: SN for INR Check Home Health company: [employer] Date of initial visit: 5/6/2019 Durable Medical Equipment ordered/company: Has own Durable Medical Equipment received: Has own Barriers to care? medication management, support system Advance Care Planning:  
Does patient have an Advance Directive:  not on file Medication(s):  
New Medications at Discharge: START taking these medications Details  
carvedilol (COREG) 6.25 mg PO TABS Take 1 Tab by Mouth 2 Times Daily with Meals. enoxaparin (LOVENOX) 40 mg/0.4 mL SC Syrg Inject 0.4 mL beneath the skin Every 24 Hours. isosorbide mononitrate CR (IMDUR) 30 mg PO TB24 Take 1 Tab by Mouth Once a Day. Changed Medications at Discharge: CONTINUE these medications which have CHANGED Details  
warfarin (COUMADIN) 2 mg PO TABS Take 3 Tabs by Mouth See Admin Instrs. Until otherwise instructed Discontinued Medications at Discharge: STOP taking these medications  
 
aspirin EC 81 mg PO TBEC Comments:  
Reason for Stopping:  
 
guaiFENesin (MUCINEX) 600 mg PO Ta12 Comments:  
Reason for Stopping:  
 
lisinopril (PRINIVIL) 20 mg PO TABS Comments:  
Reason for Stopping:  
 
metoprolol (LOPRESSOR) 25 mg PO TABS Comments:  
Reason for Stopping:   
 
 
 
Medication reconciliation was performed with Patient and daughter Mrs Blaine Ervin, who verbalizes understanding of administration of home medications. NN also typed up a current list of medication for patient There were no barriers to obtaining medications identified at this time. Referral to Pharm D needed: no  
 
Current Outpatient Medications Medication Sig  
  warfarin (COUMADIN) 2 mg tablet Take 6 mg by mouth daily.  enoxaparin (LOVENOX) 40 mg/0.4 mL 40 mg by SubCUTAneous route daily. For 10 doses  carvedilol (COREG) 6.25 mg tablet Take 1 Tab by mouth two (2) times daily (with meals).  isosorbide mononitrate ER (IMDUR) 30 mg tablet Take 30 mg by mouth daily.  rOPINIRole (REQUIP) 0.5 mg tablet take 1 tablet by mouth twice a day  esomeprazole (NEXIUM) 40 mg capsule take 1 capsule by mouth once daily  spironolactone (ALDACTONE) 25 mg tablet Take  by mouth daily.  bumetanide (BUMEX) 2 mg tablet Take 1 Tab by mouth daily.  atorvastatin (LIPITOR) 20 mg tablet take 1 tablet by mouth once daily  alendronate (FOSAMAX) 70 mg tablet take 1 tablet by mouth every week WITH 2,000 UNIT VIT D  
 multivitamins-minerals-lutein (CENTRUM SILVER) Tab Take  by mouth.  calcium carbonate (CALTREX) 600 mg (1,500 mg) tablet Take 600 mg by mouth two (2) times a day.  cholecalciferol, VITAMIN D3, (VITAMIN D3) 5,000 unit tab tablet Take  by mouth daily.  mometasone (NASONEX) 50 mcg/actuation nasal spray 2 Sprays by Both Nostrils route daily. No current facility-administered medications for this visit. Medications Discontinued During This Encounter Medication Reason  metoprolol tartrate (LOPRESSOR) 25 mg tablet Discontinued at Discharge  lisinopril (PRINIVIL, ZESTRIL) 20 mg tablet Discontinued at Discharge  aspirin 81 mg tablet Discontinued at Discharge  warfarin (COUMADIN) 1 mg tablet Discontinued at Discharge  warfarin (COUMADIN) 6 mg tablet Discontinued at Discharge [facility] follow up appointment(s):  
Future Appointments Date Time Provider Sachin Cam 5/10/2019  2:00 PM Chato Headley MD [facility] follow up appointment(s): N/A [facility]:  n/a  
 
 
Goals Post Hospitalization  Attends follow-up appointments as directed. Keep ABELARDO f/u appointment 5/10/2019 @ 2PM with Dr Alice Friedman  Knowledge and adherence of prescribed medication (ie. action, side effects, missed dose, etc.). NN spent over 55 minutes doing medication reconciliation and patient called back again x 20 more. NN type up current ,discontinued and new medications for patient laminated copies for her son to  today. 5/6/2019 Newest coumadin dose  is  6 mg daily with INR every Monday and Thursday  Prevent complications post hospitalization. Patient will keep ABELARDO follow up appointment 5/10/2019 @ 2pm 
  
  Understands red flags post discharge. S&S of GIB Tarry black or red stools Weakness or dizziness with light head SOB, blurred vision Abdominal pains

## 2019-05-06 NOTE — TELEPHONE ENCOUNTER
Hira Lawrence from [facility] admitted patient to home care; PT/INR was completed. PT: 13.9  INR: 1.2    Takes 6 mg daily; also takes lovenox 40mg/4ml until she reaches 2.0    Contact #:[phone]    Please advise.

## 2019-05-06 NOTE — Clinical Note
Patient has a ABELARDO at 2 pm on Friday 5./10. D/c from [facility] CARE [place] 5/4 GIB back on coumadin.  HH to draw INR on Mondays and Thursday INR Goal 2.5-3 on 6 mg now

## 2019-05-10 ENCOUNTER — OFFICE VISIT (OUTPATIENT)
Dept: FAMILY MEDICINE CLINIC | Age: 84
End: 2019-05-10

## 2019-05-10 DIAGNOSIS — I10 ESSENTIAL HYPERTENSION, BENIGN: ICD-10-CM

## 2019-05-10 DIAGNOSIS — K92.2 UGIB (UPPER GASTROINTESTINAL BLEED): ICD-10-CM

## 2019-05-10 DIAGNOSIS — I48.20 CHRONIC ATRIAL FIBRILLATION (HCC): ICD-10-CM

## 2019-05-10 DIAGNOSIS — N18.30 CKD (CHRONIC KIDNEY DISEASE) STAGE 3, GFR 30-59 ML/MIN (HCC): ICD-10-CM

## 2019-05-10 DIAGNOSIS — Z95.2 HISTORY OF HEART VALVE REPLACEMENT: ICD-10-CM

## 2019-05-10 DIAGNOSIS — D50.0 BLOOD LOSS ANEMIA: Primary | ICD-10-CM

## 2019-05-10 DIAGNOSIS — N18.30 STAGE 3 CHRONIC KIDNEY DISEASE (HCC): ICD-10-CM

## 2019-05-10 DIAGNOSIS — Z79.01 CHRONIC ANTICOAGULATION: ICD-10-CM

## 2019-05-10 DIAGNOSIS — D64.9 CHRONIC ANEMIA: ICD-10-CM

## 2019-05-10 NOTE — PROGRESS NOTES
HISTORY OF PRESENT ILLNESS  Jeremy Daniels is a 80 y.o. female. Hospital Follow Up   The history is provided by the patient and medical records. Pertinent negatives include no chest pain. Admit Date: 4/25/2019   Discharge Date: 5/4/2019   Attending: Katerina Null MD   Primary Care Provider: Edwar Forbes MD   Patient ID:   All Munoz   80 y.o. female   12/25/1930   Reason for Admission: No admission diagnoses are documented for this encounter. Operative Procedures: None   Consultants:   Consulting Physicians   Consulting Provider: Rita Stallworth MD   Consulting Provider:  Yamilet Walls, [address] [place] [address] Provider: Md, [place]   Consulting Provider: Katerina Null MD   Hospital Course:   Acute presumed UGIB (upper gastrointestinal bleed), melena, in the setting of supratherapeutic INR 3.91   -History of gastric ulcer 2007   -She reports that she had an ulcer ? 2007 in her stomach versus duodenum many years ago and had an endoscopy. She does not remember what year this was. -Patient continues to be on Coumadin for metallic aortic valve and a bioprosthetic mitral valve. -Cardiology had recommended not reversing it due to mechanical AVR. -INR trending for EGD after <1.5, which was completed by Dr. Reza Ludwig on 4/20/19 - Non-bleeding erosive gastropathy. Non-bleeding gastric ulcer with no stigmata of bleeding.   -Per GI, ok to restart coumadin, which has been ordered. INR was 4 on admission, needs closer monitoring outpatient, follows at Dr. Lawrence Barry office. I d/w daughter Ravin Marie regarding INR check twice weekly. -s/p protonix drip. Per GI cont PPI, advance diet, check HP stool Ag which was ordered by Aaliyah Del Cid and will need to treat if positive, no plans for colonoscopy at this time. Needs to follow up with [facility] with bridge to Coumadin, as recommended by cardiologist Dr. Mar Grove on 5/2/19. No evidence of rebleeding.  Medically stable for discharge home with home health for PT, OT, skilled nursing, CHF tele health. Family will need to administer Lovenox.  or daughter can do this, next dose not due until tomorrow afternoon. If daughter is unavailable, she can come back to the nursing station for training in the morning tomorrow.   -needs outpatient follow up with GI and PCP. PT yasmany recommending home health   Nonischemic cardiomyopathy--EF of 88%, chronic systolic CHF without acute exacerbation   Moderate aortic stenosis-status post mechanical AVR 2009   Severe mitral regurgitation-status post bioprosthetic MVR 2009   Symptomatic second-degree AV block status post dual-chamber pacemaker placement, upgraded in December 2016   Paroxysmal atrial fibrillation   -Some mild shortness of breath and CXR with congestion/pleural effusion, received IV Lasix with torsemide and had improvement. Creat worsened s/p additional IV Lasix, improved with small fluid bolus, resume home diuretics upon discharge. Follow-up with PCP this week. -Cardiology consult appreciated   -Continue statin, Bumex, Coreg, Imdur, Aldactone upon discharge, as detailed below. Holding lisinopril due to his old age and elevated creat. Acute kidney injury without ATN -Creat now stable, okay to resume home Bumex, aldactone, but due to renal failure ACEi was not resumed. Chest x-ray showed congestion and pt with trace crackles but generally appearing well balanced. Avoid NSAIDs/ACEi/nephrotoxics/IV contrast, monitor strict in/out. BNP down somewhat. Essential hypertension   -Monitor with the above medication changes   Dyslipidemia   -Continue statin   Restless leg syndrome   -Continue Requip   Medically stable for discharge.    Physical Exam on Discharge:   /60  Pulse 63  Temp 98.6 °F (37 °C)  Resp 18  Ht 59\" (1.499 m)  Wt 46.4 kg (102 lb 3.2 oz)  SpO2 93%  BMI 20.64 kg/m²   Gen: Well-nourished, well-developed, in no apparent distress, elderly -American female, currently not on oxygen, pleasant and smiling, comfortable appearing, upright in bed, not on oxygen   HEENT: Moist mucous membranes, conjunctiva normal, sclera normal, normal dentition   Neck: No JVD, normal appearing   Cardio: Regular rhythm and rate, normal S1-S2, no murmurs rubs or gallops   Pulm: Trace bibasilar crackles, no wheezing, normal work of breathing   GI: Soft, nontender, nondistended, no guarding, no rebound, normoactive bowel sounds   Extremities: No cyanosis or clubbing, +edema   Neuro: No obvious focal neurological deficits. Not appearing confused. Some generalized weakness. Skin: Clear, dry, intact anteriorly, see nursing notes for posterior details   Psych: Not appearing confused, normal affect and mood   Labwork and Ancillary Studies     CBC w/Diff              Recent Labs     05/04/19   0348 05/03/19   1450 05/03/19   0341 05/03/19   0340  05/02/19   0235    WBC 8.3 --  --  8.8 --  7.9    RBC 2.57* --  --  2.72* --  2.49*    HEMOGLOBIN 8.4* 8.4* 8.8* 9.0* < > 8.1*    HCT 25.6* 25.7* 27.0* 27.3* < > 25.4*      Mr#: [id number]      Patient Active Problem List   Diagnosis Code    History of heart valve replacement Z95.2    Essential hypertension, benign I10    Other hyperlipidemia E78.49    Atrial fibrillation (HCC) I48.91    Osteoarthritis of knee M17.10    Osteoporosis M81.0    History of total knee replacement Z96.659    Acute congestive heart failure (HCC) I50.9    Cardiomyopathy (HCC) I42.9    Refractory anemia ([facility]) D46.4    Prediabetes R73.03    CKD (chronic kidney disease) stage 3, GFR 30-59 ml/min ([facility]) N18.3    Chronic anticoagulation Z79.01    Gastroesophageal reflux disease K21.9    UGIB (upper gastrointestinal bleed) K92.2         Current Outpatient Medications:     warfarin (COUMADIN) 2 mg tablet, Take 6 mg by mouth daily. , Disp: , Rfl:     enoxaparin (LOVENOX) 40 mg/0.4 mL, 40 mg by SubCUTAneous route daily.  For 10 doses, Disp: , Rfl:     carvedilol (COREG) 6.25 mg tablet, Take 1 Tab by mouth two (2) times daily (with meals). , Disp: , Rfl: 0    isosorbide mononitrate ER (IMDUR) 30 mg tablet, Take 30 mg by mouth daily. , Disp: , Rfl:     rOPINIRole (REQUIP) 0.5 mg tablet, take 1 tablet by mouth twice a day, Disp: 180 Tab, Rfl: 3    esomeprazole (NEXIUM) 40 mg capsule, take 1 capsule by mouth once daily, Disp: 90 Cap, Rfl: 1    cholecalciferol, VITAMIN D3, (VITAMIN D3) 5,000 unit tab tablet, Take  by mouth daily. , Disp: , Rfl:     spironolactone (ALDACTONE) 25 mg tablet, Take  by mouth daily. , Disp: , Rfl:     bumetanide (BUMEX) 2 mg tablet, Take 1 Tab by mouth daily. , Disp: 90 Tab, Rfl: 1    atorvastatin (LIPITOR) 20 mg tablet, take 1 tablet by mouth once daily, Disp: 90 Tab, Rfl: 3    alendronate (FOSAMAX) 70 mg tablet, take 1 tablet by mouth every week WITH 2,000 UNIT VIT D, Disp: 12 Tab, Rfl: 3    mometasone (NASONEX) 50 mcg/actuation nasal spray, 2 Sprays by Both Nostrils route daily. , Disp: 3 Container, Rfl: 3    multivitamins-minerals-lutein (CENTRUM SILVER) Tab, Take  by mouth.  , Disp: , Rfl:     calcium carbonate (CALTREX) 600 mg (1,500 mg) tablet, Take 600 mg by mouth two (2) times a day.  , Disp: , Rfl:      No Known Allergies      Review of Systems   Constitutional: Negative for fever and weight loss. Cardiovascular: Negative for chest pain and leg swelling. Visit Vitals  /72 (BP 1 Location: Left arm, BP Patient Position: Sitting)   Pulse 67   Temp 98.2 °F (36.8 °C) (Oral)   Resp 14   Ht 4' 10\" (1.473 m)   Wt 100 lb (45.4 kg)   SpO2 97%   BMI 20.90 kg/m²       Physical Exam   Constitutional: She is oriented to person, place, and time. She appears well-developed and well-nourished. HENT:   Head: Normocephalic. Eyes: Conjunctivae and EOM are normal.   Neck: Neck supple. Cardiovascular: Normal rate. Murmur ( Precordial murmur) heard. Irregular rhythm   Pulmonary/Chest: Effort normal and breath sounds normal.   Musculoskeletal: She exhibits no edema. Neurological: She is alert and oriented to person, place, and time. Skin: Skin is warm and dry. Psychiatric: She has a normal mood and affect. Her behavior is normal.   Nursing note and vitals reviewed. Results for Floyd Martinez (MRN [id number]) as of 5/10/2019 18:03   Ref. Range 5/10/2019 14:55   Hemoglobin (POC) Unknown 8.4     Hemoglobin level unchanged since hospital discharge  ASSESSMENT and PLAN    ICD-10-CM ICD-9-CM    1. Blood loss anemia D50.0 280.0 AMB POC HEMOGLOBIN (HGB)   2. UGIB (upper gastrointestinal bleed) K92.2 578.9    3. Acute combined systolic and diastolic congestive heart failure (HCC) I50.41 428.41      428.0    4. Chronic atrial fibrillation (HCC) I48.2 427.31    5. Chronic anticoagulation Z79.01 V58.61    6. History of heart valve replacement Z95.2 V43.3    7. CKD (chronic kidney disease) stage 3, GFR 30-59 ml/min ([facility]) N18.3 585. 3    Begin ferrous sulfate 325 mg twice daily with food  Monitor INR  Return in two weeks, sooner with any problems  Office hemoglobin at follow-up and discussion about follow-up with [facility] gastroenterology

## 2019-05-14 ENCOUNTER — TELEPHONE (OUTPATIENT)
Dept: FAMILY MEDICINE CLINIC | Age: 84
End: 2019-05-14

## 2019-05-14 DIAGNOSIS — I48.20 CHRONIC ATRIAL FIBRILLATION (HCC): Primary | ICD-10-CM

## 2019-05-14 DIAGNOSIS — Z95.2 HISTORY OF HEART VALVE REPLACEMENT: ICD-10-CM

## 2019-05-14 DIAGNOSIS — Z79.01 CHRONIC ANTICOAGULATION: ICD-10-CM

## 2019-05-14 RX ORDER — WARFARIN 2 MG/1
TABLET ORAL
Qty: 100 TAB | Refills: 1 | Status: SHIPPED | OUTPATIENT
Start: 2019-05-14 | End: 2019-06-27

## 2019-05-14 RX ORDER — ENOXAPARIN SODIUM 100 MG/ML
40 INJECTION SUBCUTANEOUS DAILY
Qty: 5 SYRINGE | Refills: 1 | Status: SHIPPED | OUTPATIENT
Start: 2019-05-14 | End: 2019-05-24 | Stop reason: ALTCHOICE

## 2019-05-14 NOTE — TELEPHONE ENCOUNTER
Spoke with Radhames Allison from [facility] with medications changes ordered by PCP; patient's INR will be checked every other day; Rx for warfarin and Lovenox sent to patient's local pharmacy.

## 2019-05-14 NOTE — TELEPHONE ENCOUNTER
Please advised to increase warfarin 2.0 mg to 3-1/2 tablets daily or 7 mg. Continue Lovenox until INR is 2.0 or higher. Check INR every other day. Prescriptions for warfarin and Lovenox have been sent to the patient's pharmacy.

## 2019-05-14 NOTE — TELEPHONE ENCOUNTER
Sanjuanita Arango from [facility] calling in patient's PT/INR results:    PT: 15.5  INR: 1.3    Patient is taking coumadin 6 mg daily (has 6 mg tabs); also taking Lovenox injections 40 mg/4ml until she reaches INR of 2.0; patient will need refill of this if PCP wants her to continue with Lovenox. No bleeding/bruising  No missed doses  Dietary changes: pt is eating more greens    Please advise.

## 2019-05-15 ENCOUNTER — PATIENT OUTREACH (OUTPATIENT)
Dept: FAMILY MEDICINE CLINIC | Age: 84
End: 2019-05-15

## 2019-05-16 ENCOUNTER — TELEPHONE (OUTPATIENT)
Dept: FAMILY MEDICINE CLINIC | Age: 84
End: 2019-05-16

## 2019-05-16 NOTE — TELEPHONE ENCOUNTER
Spoke with Lizabeth [address] [facility] nurse from [facility]; patient's coumadin dose will be alternated with 7 mg and 8 mg; recheck on Monday, 5/20/19; that office does not anyone available to check on the weekend; and then recheck every other day.

## 2019-05-16 NOTE — TELEPHONE ENCOUNTER
Zhen Allen, [address] [facility] nurse called reporting patient's PT/INR as follows:    PT: 19.4  INR: 1.6  No missed doses  No dietary changes  No bruising/bleeding  Dosage: 7 mg daily    Please advise.

## 2019-05-20 ENCOUNTER — TELEPHONE (OUTPATIENT)
Dept: FAMILY MEDICINE CLINIC | Age: 84
End: 2019-05-20

## 2019-05-20 NOTE — TELEPHONE ENCOUNTER
Lizabeth from [facility] calling in patient's PT/INR:    PT: 25.5  INR 2.1  No missed doses  No dietary changes  Current dosage: 7 mg and 8 mg alternating    Please advise.

## 2019-05-20 NOTE — TELEPHONE ENCOUNTER
Please advised to stop lovastatin, decrease warfarin back to 7 mg daily recheck INR on Wednesday, 5/22/2019

## 2019-05-21 ENCOUNTER — TELEPHONE (OUTPATIENT)
Dept: FAMILY MEDICINE CLINIC | Age: 84
End: 2019-05-21

## 2019-05-21 NOTE — TELEPHONE ENCOUNTER
Wolf Llamas from [facility] physical therapy to state that they have discharged the pt early due to the patients request. The pt believes she is back to her \"old self\" and the therapist states she has met all of her physical therapy goals.

## 2019-05-21 NOTE — TELEPHONE ENCOUNTER
Left message for Lizabeth [facility] Nurse; per PCP d/c lovenox, decrease warfarin back to 7 mg daily, recheck INR on 5/22/2019.

## 2019-05-22 ENCOUNTER — TELEPHONE (OUTPATIENT)
Dept: FAMILY MEDICINE CLINIC | Age: 84
End: 2019-05-22

## 2019-05-22 NOTE — TELEPHONE ENCOUNTER
Please advised to change warfarin dose to 6 mg alternating with 7 mg beginning with 6 mg today.   INR on 5/24/2019

## 2019-05-22 NOTE — TELEPHONE ENCOUNTER
Left message for Lizabeth at [facility] alternating 6 mg with 7 mg, starting with 6mg tonight; recheck INR on 5/24/19.

## 2019-05-24 ENCOUNTER — TELEPHONE (OUTPATIENT)
Dept: FAMILY MEDICINE CLINIC | Age: 84
End: 2019-05-24

## 2019-05-24 ENCOUNTER — OFFICE VISIT (OUTPATIENT)
Dept: FAMILY MEDICINE CLINIC | Age: 84
End: 2019-05-24

## 2019-05-24 VITALS
RESPIRATION RATE: 13 BRPM | HEART RATE: 70 BPM | WEIGHT: 98.2 LBS | DIASTOLIC BLOOD PRESSURE: 78 MMHG | SYSTOLIC BLOOD PRESSURE: 136 MMHG | OXYGEN SATURATION: 96 % | BODY MASS INDEX: 20.61 KG/M2 | TEMPERATURE: 97.8 F | HEIGHT: 58 IN

## 2019-05-24 DIAGNOSIS — Z79.01 CHRONIC ANTICOAGULATION: ICD-10-CM

## 2019-05-24 DIAGNOSIS — Z95.2 HISTORY OF HEART VALVE REPLACEMENT: ICD-10-CM

## 2019-05-24 DIAGNOSIS — I48.20 CHRONIC ATRIAL FIBRILLATION (HCC): ICD-10-CM

## 2019-05-24 DIAGNOSIS — D50.0 BLOOD LOSS ANEMIA: Primary | ICD-10-CM

## 2019-05-24 DIAGNOSIS — N18.30 CKD (CHRONIC KIDNEY DISEASE) STAGE 3, GFR 30-59 ML/MIN (HCC): ICD-10-CM

## 2019-05-24 LAB — HGB BLD-MCNC: 9.8 G/DL

## 2019-05-24 RX ORDER — PREDNISONE 5 MG/1
TABLET ORAL SEE ADMIN INSTRUCTIONS
COMMUNITY
End: 2019-12-11

## 2019-05-24 NOTE — PROGRESS NOTES
HISTORY OF PRESENT ILLNESS  Allison Ayers is a 80 y.o. female. Palpitations    The history is provided by the patient, relative and medical records. This is a chronic problem. Episode onset: Several years ago. Pertinent negatives include no fever, no malaise/fatigue ( Reports increased energy, good appetite), no chest pain, no abdominal pain, no nausea, no vomiting, no headaches, no dizziness and no shortness of breath. Mr#: [id number]      Patient Active Problem List   Diagnosis Code    History of heart valve replacement Z95.2    Essential hypertension, benign I10    Other hyperlipidemia E78.49    Atrial fibrillation ([facility] I48.91    Osteoarthritis of knee M17.10    Osteoporosis M81.0    History of total knee replacement Z96.659    Acute congestive heart failure (HCC) I50.9    Cardiomyopathy ([facility] I42.9    Refractory anemia ([facility]) D46.4    Prediabetes R73.03    CKD (chronic kidney disease) stage 3, GFR 30-59 ml/min ([facility]) N18.3    Chronic anticoagulation Z79.01    Gastroesophageal reflux disease K21.9    UGIB (upper gastrointestinal bleed) K92.2         Current Outpatient Medications:     predniSONE (STERAPRED) 5 mg dose pack, Take  by mouth See Admin Instructions. See administration instruction per 5mg dose pack, Disp: , Rfl:     bumetanide (BUMEX) 2 mg tablet, take 1 tablet by mouth once daily, Disp: 90 Tab, Rfl: 1    warfarin (COUMADIN) 2 mg tablet, Take 3-1/2 tablets daily, Disp: 100 Tab, Rfl: 1    ferrous sulfate (IRON) 325 mg (65 mg iron) EC tablet, Take 1 Tab by mouth two (2) times daily (with meals). , Disp: 60 Tab, Rfl: 3    carvedilol (COREG) 6.25 mg tablet, Take 1 Tab by mouth two (2) times daily (with meals). , Disp: , Rfl: 0    isosorbide mononitrate ER (IMDUR) 30 mg tablet, Take 30 mg by mouth daily. , Disp: , Rfl:     rOPINIRole (REQUIP) 0.5 mg tablet, take 1 tablet by mouth twice a day, Disp: 180 Tab, Rfl: 3    esomeprazole (NEXIUM) 40 mg capsule, take 1 capsule by mouth once daily, Disp: 90 Cap, Rfl: 1    cholecalciferol, VITAMIN D3, (VITAMIN D3) 5,000 unit tab tablet, Take  by mouth daily. , Disp: , Rfl:     spironolactone (ALDACTONE) 25 mg tablet, Take  by mouth daily. , Disp: , Rfl:     atorvastatin (LIPITOR) 20 mg tablet, take 1 tablet by mouth once daily, Disp: 90 Tab, Rfl: 3    alendronate (FOSAMAX) 70 mg tablet, take 1 tablet by mouth every week WITH 2,000 UNIT VIT D, Disp: 12 Tab, Rfl: 3    multivitamins-minerals-lutein (CENTRUM SILVER) Tab, Take  by mouth.  , Disp: , Rfl:     calcium carbonate (CALTREX) 600 mg (1,500 mg) tablet, Take 600 mg by mouth two (2) times a day.  , Disp: , Rfl:      No Known Allergies      Review of Systems   Constitutional: Negative for fever, malaise/fatigue ( Reports increased energy, good appetite) and weight loss. Respiratory: Negative for shortness of breath. Cardiovascular: Negative for chest pain, palpitations and leg swelling. Gastrointestinal: Negative for abdominal pain, constipation, diarrhea, heartburn, nausea and vomiting. Genitourinary: Negative for dysuria and urgency. Neurological: Negative for dizziness and headaches. Physical Exam   Constitutional: She is oriented to person, place, and time. She appears well-developed and well-nourished. HENT:   Head: Normocephalic. Eyes: Conjunctivae and EOM are normal.   Neck: Neck supple. Cardiovascular: Normal rate, regular rhythm and normal heart sounds. Now back in sinus rhythm   Pulmonary/Chest: Effort normal and breath sounds normal.   Musculoskeletal: She exhibits no edema. Neurological: She is alert and oriented to person, place, and time. Skin: Skin is warm and dry. Psychiatric: She has a normal mood and affect. Her behavior is normal.   Nursing note and vitals reviewed. Results for Della Nick (MRN [id number]) as of 5/24/2019 10:15   Ref.  Range 5/10/2019 14:55   Hemoglobin (POC) Unknown 8.4     INR per home health today 4.8 on warfarin 6 mg alternating with 7 mg daily    ASSESSMENT and PLAN    ICD-10-CM ICD-9-CM    1. Blood loss anemia D50.0 280.0 AMB POC HEMOGLOBIN (HGB)   2. Chronic atrial fibrillation (HCC) I48.2 427.31    3. Chronic anticoagulation Z79.01 V58.61    4. History of heart valve replacement Z95.2 V43.3    5. CKD (chronic kidney disease) stage 3, GFR 30-59 ml/min ([facility]) N18.3 585. 3    Do not take warfarin today or tomorrow. Start back taking warfarin 6 mg daily on Sunday, 5/26/2019. Home health has been asked to recheck your INR on 5/29/2019  Otherwise continue current medications.   Return here for follow-up in 1 month, sooner with any problems, BMP, CBC at follow-up

## 2019-05-24 NOTE — TELEPHONE ENCOUNTER
Please advise home health that patient will be advised to hold warfarin today and tomorrow, then resume 6 mg daily on 5/26/2019, repeat INR on 5/29/2019

## 2019-05-24 NOTE — PROGRESS NOTES
Kaylin Camarena is a 80 y.o. female (: 1930) presenting to address:    Chief Complaint   Patient presents with    Medication Evaluation       Vitals:    19 0953   Weight: 98 lb 3.2 oz (44.5 kg)   Height: 4' 10\" (1.473 m)   PainSc:   0 - No pain       Hearing/Vision:   No exam data present    Learning Assessment:     Learning Assessment 3/6/2015   PRIMARY LEARNER Patient   HIGHEST LEVEL OF EDUCATION - PRIMARY LEARNER  DID NOT GRADUATE HIGH SCHOOL   BARRIERS PRIMARY LEARNER NONE   CO-LEARNER CAREGIVER No   PRIMARY LANGUAGE ENGLISH    NEED No   LEARNER PREFERENCE PRIMARY DEMONSTRATION   ANSWERED BY Patient   RELATIONSHIP SELF     Depression Screening:     3 most recent PHQ Screens 2019   Little interest or pleasure in doing things Not at all   Feeling down, depressed, irritable, or hopeless Not at all   Total Score PHQ 2 0     Fall Risk Assessment:     Fall Risk Assessment, last 12 mths 2019   Able to walk? Yes   Fall in past 12 months? No   Fall with injury? -   Number of falls in past 12 months -   Fall Risk Score -     Abuse Screening:     Abuse Screening Questionnaire 5/10/2019   Do you ever feel afraid of your partner? N   Are you in a relationship with someone who physically or mentally threatens you? N   Is it safe for you to go home? Y     Coordination of Care Questionaire:   1. Have you been to the ER, urgent care clinic since your last visit? Hospitalized since your last visit? NO    2. Have you seen or consulted any other health care providers outside of the [facility] since your last visit? Include any pap smears or colon screening. YES; ophthamology-Dr. Jeronimo Madrigal    Advanced Directive:   1. Do you have an Advanced Directive? NO    2. Would you like information on Advanced Directives?  NO

## 2019-05-24 NOTE — PATIENT INSTRUCTIONS
Do not take warfarin today or tomorrow. Start back taking warfarin 6 mg daily on Sunday, 5/26/2019. Home health has been asked to recheck your INR on 5/29/2019 Otherwise continue current medications. Return here for follow-up in 1 month, sooner with any problems Taking Warfarin Safely: Care Instructions Your Care Instructions Warfarin is a medicine that you take to prevent blood clots. It is often called a blood thinner. Doctors give warfarin (such as Coumadin) to reduce the risk of blood clots. You may be at risk for blood clots if you have atrial fibrillation or deep vein thrombosis. Some other health problems may also put you at risk. Warfarin slows the amount of time it takes for your blood to clot. It can cause bleeding problems. Even if you've been taking warfarin for a while, it's important to know how to take it safely. Foods and other medicines can affect the way warfarin works. Some can make warfarin work too well. This can cause bleeding problems. And some can make it work poorly, so that it does not prevent blood clots very well. You will need regular blood tests to check how long it takes for your blood to form a clot. This test is called a PT or prothrombin time test. The result of the test is called an INR level. Depending on the test results, your doctor or anticoagulation clinic may adjust your dose of warfarin. Follow-up care is a key part of your treatment and safety. Be sure to make and go to all appointments, and call your doctor if you are having problems. It's also a good idea to know your test results and keep a list of the medicines you take. How can you care for yourself at home? Take warfarin safely · Take your warfarin at the same time each day. · If you miss a dose of warfarin, don't take an extra dose to make up for it. Your doctor can tell you exactly what to do so you don't take too much or too little. · Wear medical alert jewelry that lets others know that you take warfarin. You can buy this at most drugstores. · Don't take warfarin if you are pregnant or planning to get pregnant. Talk to your doctor about how you can prevent getting pregnant while you are taking it. · Don't change your dose or stop taking warfarin unless your doctor tells you to. Effects of medicines and food on warfarin · Don't start or stop taking any medicines, vitamins, or natural remedies unless you first talk to your doctor. Many medicines can affect how warfarin works. These include aspirin and other pain relievers, over-the-counter medicines, multivitamins, dietary supplements, and herbal products. · Tell all of your doctors and pharmacists that you take warfarin. Some prescription medicines can affect how warfarin works. · Keep the amount of vitamin K in your diet about the same from day to day. Do not suddenly eat a lot more or a lot less food that is rich in vitamin K than you usually do. Vitamin K affects how warfarin works and how your blood clots. Talk with your doctor before making big changes in your diet. Foods that have a lot of vitamin K include cooked green vegetables, such as: 
? Kale, spinach, turnip greens, kevin greens, Swiss chard, and mustard greens. ? [place] sprouts, broccoli, and asparagus. · Limit your use of alcohol. Avoid bleeding by preventing falls and injuries · Wear slippers or shoes with nonskid soles. · Remove throw rugs and clutter. · Rearrange furniture and electrical cords to keep them out of walking paths. · Keep stairways, porches, and outside walkways well lit. Use night-lights in hallways and bathrooms. · Be extra careful when you work with sharp tools or knives. When should you call for help? Call 911 anytime you think you may need emergency care. For example, call if: 
  · You have a sudden, severe headache that is different from past headaches.  Call your doctor now or seek immediate medical care if: 
  · You have any abnormal bleeding, such as: 
? Nosebleeds. ? Vaginal bleeding that is different (heavier, more frequent, at a different time of the month) than what you are used to. 
? Bloody or black stools, or rectal bleeding. ? Bloody or pink urine.  
 Watch closely for changes in your health, and be sure to contact your doctor if you have any problems. Where can you learn more? Go to http://navin-isidro.info/. Enter H416 in the search box to learn more about \"Taking Warfarin Safely: Care Instructions. \" Current as of: July 22, 2018 Content Version: 11.9 © 3384-0499 [employer], [employer]. Care instructions adapted under license by [employer] (which disclaims liability or warranty for this information). If you have questions about a medical condition or this instruction, always ask your healthcare professional. [address] any warranty or liability for your use of this information.

## 2019-05-24 NOTE — TELEPHONE ENCOUNTER
Lizabeth from [facility] calling in patient's INR:    INR 4.5  No missed doses  No dietary changes  No bruising/bleeding    Patient taking 6mg, alternating with 7mg; please advise.  Patient is coming in today for an appointment at 1000 am.

## 2019-05-29 ENCOUNTER — CLINICAL SUPPORT (OUTPATIENT)
Dept: FAMILY MEDICINE CLINIC | Age: 84
End: 2019-05-29

## 2019-05-29 DIAGNOSIS — Z79.01 CHRONIC ANTICOAGULATION: ICD-10-CM

## 2019-05-29 DIAGNOSIS — I48.20 CHRONIC ATRIAL FIBRILLATION (HCC): Primary | ICD-10-CM

## 2019-05-29 LAB
INR BLD: 2.3
PT POC: 28 SECONDS
VALID INTERNAL CONTROL?: YES

## 2019-05-29 RX ORDER — ATORVASTATIN CALCIUM 20 MG/1
20 TABLET, FILM COATED ORAL DAILY
Qty: 90 TAB | Refills: 3 | Status: SHIPPED | OUTPATIENT
Start: 2019-05-29 | End: 2020-02-14

## 2019-05-29 NOTE — TELEPHONE ENCOUNTER
Atorvastatin last ordered 06/11/18 with a quantity of 90 and 3 refills.     Patients last appointment May 24, 2019   Next scheduled June 27, 2019

## 2019-05-29 NOTE — PROGRESS NOTES
Catina Peacock is a 80 y.o. female who presents today for Anticoagulation monitoring. Indication: Atrial Fibrillation  INR Goal: 2.0-3.0. Current dose:  Coumadin 6mg daily. Missed Coumadin Doses:  None  Medication Changes:  no  Dietary Changes:  no    Symptoms: taking coumadin appropriately without any bleeding. Latest INRs:  Lab Results   Component Value Date/Time    INR 1.7 (H) 09/30/2014 09:47 AM    INR 3.1 (H) 01/03/2014 12:00 AM    INR POC 2.3 05/29/2019 10:59 AM    INR POC 3.5 04/05/2019 11:17 AM    INR POC 2.5 03/06/2019 11:10 AM    Prothrombin time 17.6 (H) 09/30/2014 09:47 AM    Prothrombin time 32.8 (H) 01/03/2014 12:00 AM        New Coumadin dose: Per Dr. Baylee Lockett, current treatment plan is effective, no change in therapy. Next check to be scheduled for 4 weeks, per Dr. Baylee Lockett.

## 2019-05-30 ENCOUNTER — PATIENT OUTREACH (OUTPATIENT)
Dept: FAMILY MEDICINE CLINIC | Age: 84
End: 2019-05-30

## 2019-05-30 RX ORDER — METOPROLOL TARTRATE 25 MG/1
1 TABLET, FILM COATED ORAL DAILY
COMMUNITY
Start: 2019-05-13 | End: 2020-03-02

## 2019-05-30 RX ORDER — LISINOPRIL 20 MG/1
5 TABLET ORAL DAILY
Refills: 0 | COMMUNITY
Start: 2019-05-06 | End: 2020-07-20

## 2019-05-30 RX ORDER — WARFARIN 1 MG/1
1 TABLET ORAL AS DIRECTED
Refills: 0 | COMMUNITY
Start: 2019-05-04 | End: 2019-12-11

## 2019-05-30 NOTE — PROGRESS NOTES
.  Patient outreach call for Isabella Sanchez :  Admission 4/25/19 discharge 5/4/2019 S/P PAULA     Spoke with :  Patient    Problems/ concerns /discussion: None at this time    Provider notified of problems / concerns: N/A    Did patient come to [facility] f/u : YES date : 5/10/2019 and again 5/24/2019    Did patient have specialist f/u: None date N/A    Any new orders/ changes at office visit : No    Next PCP Appointment date:  6/27/2019  Next outreach anticipation date :  6/10/19 to close out episode

## 2019-05-30 NOTE — PROGRESS NOTES
.  Patient outreach call for [facility] :  Admission 4/25/19 discharge 5/4/2019 S/P APULA     Spoke with :  Patient    Problems/ concerns /discussion: None at this time    Provider notified of problems / concerns: N/A    Did patient come to [facility] f/u : YES date : 5/10/2019    Did patient have specialist f/u: None date N/A    Any new orders/ changes at office visit : No    Next PCP Appointment date:  5/24/2019    Next outreach anticipation date :  5/30/2019

## 2019-06-07 ENCOUNTER — TELEPHONE (OUTPATIENT)
Dept: FAMILY MEDICINE CLINIC | Age: 84
End: 2019-06-07

## 2019-06-07 LAB
INR, EXTERNAL: 8
PT, EXTERNAL: 0

## 2019-06-07 NOTE — TELEPHONE ENCOUNTER
Please advised that she should remain off warfarin until she returns to have an INR checked next week. Most likely she will need to stay off warfarin if her family is unable to supervise the medication.

## 2019-06-07 NOTE — TELEPHONE ENCOUNTER
Received call from [facility] stating that she is at patients home today and going over medications before discharging her and she noticed in her pill box there were 3 tablets of the 6 mg comadin in each box. So patient has been taking a total of 18 mg daily of coumadin she thinks and not sure for how long. She did check her INR and it was 8.0 patient is not having any abnormal bleeding. She also notes that per huong patient is not taking the Spironolactone 25 mg nor her Imdur 30 mg even though [facility] orders state she is suppose to be taking them still. The daughter states the hospital discontinued them however when I looked in chart at her discharge summary from the hospital as well as our medication list it states patient should be taking these.

## 2019-06-07 NOTE — TELEPHONE ENCOUNTER
Informed home health and appointment scheduled for nurse visit she will contact and inform patient and daughter.      Future Appointments   Date Time Provider Sachin Dorina   6/14/2019  1:45 PM MD KIMBERLEY Bishop   6/27/2019 10:30 AM Rey Jimenez MD [facility]

## 2019-06-14 ENCOUNTER — CLINICAL SUPPORT (OUTPATIENT)
Dept: FAMILY MEDICINE CLINIC | Age: 84
End: 2019-06-14

## 2019-06-14 DIAGNOSIS — I48.20 CHRONIC ATRIAL FIBRILLATION (HCC): Primary | ICD-10-CM

## 2019-06-14 DIAGNOSIS — Z79.01 CHRONIC ANTICOAGULATION: ICD-10-CM

## 2019-06-14 NOTE — PROGRESS NOTES
Patient came in today for PT/INR check; last check was 8.0; pt is not taking coumadin therapy; after consulting with PCP, pt will take Aspirin 81 mg daily; patient has f/u appt on 6/27/19.

## 2019-06-21 ENCOUNTER — PATIENT OUTREACH (OUTPATIENT)
Dept: FAMILY MEDICINE CLINIC | Age: 84
End: 2019-06-21

## 2019-06-21 ENCOUNTER — CLINICAL SUPPORT (OUTPATIENT)
Dept: FAMILY MEDICINE CLINIC | Age: 84
End: 2019-06-21

## 2019-06-21 DIAGNOSIS — Z79.01 CHRONIC ANTICOAGULATION: ICD-10-CM

## 2019-06-21 DIAGNOSIS — I48.20 CHRONIC ATRIAL FIBRILLATION (HCC): Primary | ICD-10-CM

## 2019-06-21 NOTE — PROGRESS NOTES
Patient outreach call for [facility] :  Admission 4/25/19 discharge 5/4/2019 S/P PAULA     Spoke with :  Left message for return call    Problems/ concerns /discussion: N/A    Provider notified of problems / concerns: N/A    Did patient come to [facility] f/u : YES date : 5/10/2019 and again 5/24/2019    Did patient have specialist f/u: None date N/A    Any new orders/ changes at office visit : No    Next PCP Appointment date:  6/27/2019  Next outreach anticipation date :  7/2/2019 to close out episode

## 2019-06-21 NOTE — PROGRESS NOTES
Patient was d/c from coumadin therapy; pt started aspirin therapy 81 mg daily; rechecked PT/INR; 12.9 sec, 1.1 INR; per PCP, no more PT/INR checks since patient is not on coumadin therapy.

## 2019-06-27 ENCOUNTER — OFFICE VISIT (OUTPATIENT)
Dept: FAMILY MEDICINE CLINIC | Age: 84
End: 2019-06-27

## 2019-06-27 VITALS
DIASTOLIC BLOOD PRESSURE: 52 MMHG | RESPIRATION RATE: 16 BRPM | BODY MASS INDEX: 19.65 KG/M2 | HEIGHT: 58 IN | TEMPERATURE: 97.7 F | WEIGHT: 93.6 LBS | HEART RATE: 64 BPM | SYSTOLIC BLOOD PRESSURE: 118 MMHG | OXYGEN SATURATION: 95 %

## 2019-06-27 DIAGNOSIS — I48.20 CHRONIC ATRIAL FIBRILLATION (HCC): ICD-10-CM

## 2019-06-27 DIAGNOSIS — D50.0 BLOOD LOSS ANEMIA: ICD-10-CM

## 2019-06-27 DIAGNOSIS — Z12.39 BREAST CANCER SCREENING: ICD-10-CM

## 2019-06-27 DIAGNOSIS — N18.30 CKD (CHRONIC KIDNEY DISEASE) STAGE 3, GFR 30-59 ML/MIN (HCC): ICD-10-CM

## 2019-06-27 DIAGNOSIS — Z00.00 ENCOUNTER FOR MEDICARE ANNUAL WELLNESS EXAM: Primary | ICD-10-CM

## 2019-06-27 DIAGNOSIS — I10 ESSENTIAL HYPERTENSION, BENIGN: ICD-10-CM

## 2019-06-27 NOTE — PROGRESS NOTES
HISTORY OF PRESENT ILLNESS  Sai Dee is a 80 y.o. female. Presents for follow-up of blood loss anemia, recently warfarin was discontinued because of this episode of bleeding and severe anemia requiring hospitalization and subsequent unsafe use of warfarin with an INR of 8.0. Also with hypertension, stage III chronic kidney disease and for annual Medicare wellness evaluation . Interval BMP and CBC today. Chronic Kidney Disease   The history is provided by the patient, relative, medical records and spouse. This is a chronic problem. Pertinent negatives include no chest pain, no abdominal pain, no headaches and no shortness of breath. Physical   The history is provided by the patient, spouse and medical records. Pertinent negatives include no chest pain, no abdominal pain, no headaches and no shortness of breath. Mr#: [id number]      Patient Active Problem List   Diagnosis Code    History of heart valve replacement Z95.2    Essential hypertension, benign I10    Other hyperlipidemia E78.49    Atrial fibrillation ([facility] [address] I48.91    Osteoarthritis of knee M17.10    Osteoporosis M81.0    History of total knee replacement Z96.659    Acute congestive heart failure (HCC) I50.9    Cardiomyopathy ([facility] [address] I42.9    Refractory anemia ([facility]) D46.4    Prediabetes R73.03    CKD (chronic kidney disease) stage 3, GFR 30-59 ml/min ([facility]) N18.3    Chronic anticoagulation Z79.01    Gastroesophageal reflux disease K21.9    UGIB (upper gastrointestinal bleed) K92.2         Current Outpatient Medications:     lisinopril (PRINIVIL, ZESTRIL) 20 mg tablet, Take 1 Tab by mouth daily. , Disp: , Rfl: 0    metoprolol tartrate (LOPRESSOR) 25 mg tablet, Take 1 Tab by mouth daily. , Disp: , Rfl:     atorvastatin (LIPITOR) 20 mg tablet, Take 1 Tab by mouth daily. , Disp: 90 Tab, Rfl: 3    predniSONE (STERAPRED) 5 mg dose pack, Take  by mouth See Admin Instructions.  See administration instruction per 5mg dose pack, Disp: , Rfl:     bumetanide (BUMEX) 2 mg tablet, take 1 tablet by mouth once daily, Disp: 90 Tab, Rfl: 1    carvedilol (COREG) 6.25 mg tablet, Take 1 Tab by mouth two (2) times daily (with meals). , Disp: , Rfl: 0    isosorbide mononitrate ER (IMDUR) 30 mg tablet, Take 30 mg by mouth daily. , Disp: , Rfl:     rOPINIRole (REQUIP) 0.5 mg tablet, take 1 tablet by mouth twice a day, Disp: 180 Tab, Rfl: 3    esomeprazole (NEXIUM) 40 mg capsule, take 1 capsule by mouth once daily, Disp: 90 Cap, Rfl: 1    cholecalciferol, VITAMIN D3, (VITAMIN D3) 5,000 unit tab tablet, Take  by mouth daily. , Disp: , Rfl:     spironolactone (ALDACTONE) 25 mg tablet, Take  by mouth daily. , Disp: , Rfl:     alendronate (FOSAMAX) 70 mg tablet, take 1 tablet by mouth every week WITH 2,000 UNIT VIT D, Disp: 12 Tab, Rfl: 3    multivitamins-minerals-lutein (CENTRUM SILVER) Tab, Take  by mouth.  , Disp: , Rfl:     warfarin (COUMADIN) 1 mg tablet, Take 1 mg by mouth as directed., Disp: , Rfl: 0    calcium carbonate (CALTREX) 600 mg (1,500 mg) tablet, Take 600 mg by mouth two (2) times a day.  , Disp: , Rfl:      No Known Allergies      Review of Systems   Constitutional: Positive for malaise/fatigue (just a little). Negative for chills, fever and weight loss. HENT: Negative for hearing loss. Eyes: Negative for blurred vision and double vision. Eye exam tomorrow   Respiratory: Negative for cough, shortness of breath and wheezing. Cardiovascular: Negative for chest pain, palpitations and leg swelling. Gastrointestinal: Negative for abdominal pain, constipation, diarrhea, heartburn, nausea and vomiting. Genitourinary: Negative for dysuria and urgency. Musculoskeletal: Negative for joint pain and myalgias. Skin: Negative for itching and rash. Neurological: Positive for tingling (fingers). Negative for dizziness, sensory change, focal weakness and headaches. Psychiatric/Behavioral: Negative for depression.  The patient is not nervous/anxious and does not have insomnia. Physical Exam   Constitutional: She is oriented to person, place, and time. She appears well-developed and well-nourished. HENT:   Head: Normocephalic. Eyes: Conjunctivae and EOM are normal.   Neck: Neck supple. Cardiovascular: Normal rate. Murmur ( Systolic murmur left proximal sternal border unchanged from previous) heard. Irregular rhythm consistent with chronic atrial fibrillation   Pulmonary/Chest: Effort normal and breath sounds normal.   Musculoskeletal: She exhibits no edema. Neurological: She is alert and oriented to person, place, and time. Skin: Skin is warm and dry. Psychiatric: She has a normal mood and affect. Her behavior is normal.   Nursing note and vitals reviewed. ASSESSMENT and PLAN    ICD-10-CM ICD-9-CM    1. Chronic atrial fibrillation (HCC) I48.2 427.31    2. Blood loss anemia D50.0 280.0 CBC WITH AUTOMATED DIFF   3. CKD (chronic kidney disease) stage 3, GFR 30-59 ml/min (HCC) N18.3 585.3    4. Essential hypertension, benign W56 832.9 METABOLIC PANEL, BASIC   5. Encounter for Medicare annual wellness exam Z00.00 V70.0    6. Breast cancer screening Z12.31 V76.10 CYN MAMMO BI SCREENING INCL CAD     Date of Service:  2019   Patient Name:  Sacha Rasmussen   Patient :  1930     This is a Subsequent Medicare Annual Wellness Visit providing Personalized Prevention Plan Services (PPPS) (Performed 12 months after initial AWV and PPPS )     I have reviewed the patient's medical history in detail and updated the computerized patient record.      History     Past Medical History:   Diagnosis Date    Congestive heart failure ([address]     Heart valve replaced     Hypercholesterolemia     Hypertension       Past Surgical History:   Procedure Laterality Date    CARDIAC SURG PROCEDURE UNLIST      heart valve    HX ORTHOPAEDIC      total knee    HX PACEMAKER       Current Outpatient Medications   Medication Sig Dispense Refill    lisinopril (PRINIVIL, ZESTRIL) 20 mg tablet Take 1 Tab by mouth daily. 0    metoprolol tartrate (LOPRESSOR) 25 mg tablet Take 1 Tab by mouth daily.  atorvastatin (LIPITOR) 20 mg tablet Take 1 Tab by mouth daily. 90 Tab 3    predniSONE (STERAPRED) 5 mg dose pack Take  by mouth See Admin Instructions. See administration instruction per 5mg dose pack      bumetanide (BUMEX) 2 mg tablet take 1 tablet by mouth once daily 90 Tab 1    carvedilol (COREG) 6.25 mg tablet Take 1 Tab by mouth two (2) times daily (with meals). 0    isosorbide mononitrate ER (IMDUR) 30 mg tablet Take 30 mg by mouth daily.  rOPINIRole (REQUIP) 0.5 mg tablet take 1 tablet by mouth twice a day 180 Tab 3    esomeprazole (NEXIUM) 40 mg capsule take 1 capsule by mouth once daily 90 Cap 1    cholecalciferol, VITAMIN D3, (VITAMIN D3) 5,000 unit tab tablet Take  by mouth daily.  spironolactone (ALDACTONE) 25 mg tablet Take  by mouth daily.  alendronate (FOSAMAX) 70 mg tablet take 1 tablet by mouth every week WITH 2,000 UNIT VIT D 12 Tab 3    multivitamins-minerals-lutein (CENTRUM SILVER) Tab Take  by mouth.  warfarin (COUMADIN) 1 mg tablet Take 1 mg by mouth as directed. 0    calcium carbonate (CALTREX) 600 mg (1,500 mg) tablet Take 600 mg by mouth two (2) times a day.          No Known Allergies  Family History   Problem Relation Age of Onset    Heart Disease Mother     Diabetes Father     Cancer Sister     Heart Attack Brother     Heart Attack Brother      Social History     Tobacco Use    Smoking status: Former Smoker    Smokeless tobacco: Never Used   Substance Use Topics    Alcohol use: No     Patient Active Problem List   Diagnosis Code    History of heart valve replacement Z95.2    Essential hypertension, benign I10    Other hyperlipidemia E78.49    Atrial fibrillation ([facility] [address] I48.91    Osteoarthritis of knee M17.10    Osteoporosis M81.0    History of total knee replacement I88.305    Acute congestive heart failure (HCC) I50.9    Cardiomyopathy ([facility]) I42.9    Refractory anemia ([facility]) D46.4    Prediabetes R73.03    CKD (chronic kidney disease) stage 3, GFR 30-59 ml/min ([facility]) N18.3    Chronic anticoagulation Z79.01    Gastroesophageal reflux disease K21.9    UGIB (upper gastrointestinal bleed) K92.2       Living situation:   -- Lives with family  -- Stairs in home no    Diet, Lifestyle: no specific diet    Exercise level: not active    Depression Risk Factor Screening:     3 most recent PHQ Screens 6/27/2019   Little interest or pleasure in doing things Not at all   Feeling down, depressed, irritable, or hopeless Not at all   Total Score PHQ 2 0     Alcohol Risk Factor Screening: You do not drink alcohol or very rarely. Functional Ability and Level of Safety:     Hearing Loss   Hearing is good. Activities of Daily Living   Self-care. Requires assistance with: no ADLs    Fall Risk     Fall Risk Assessment, last 12 mths 6/27/2019   Able to walk? Yes   Fall in past 12 months? No   Fall with injury? -   Number of falls in past 12 months -   Fall Risk Score -     Abuse Screen   Patient is not abused    Review of Systems   Review of systems reported in the separate problem-oriented documentation. Physical Examination   Physical exam reported in the separate problem-oriented documentation. VS:   Visit Vitals  /52 (BP 1 Location: Left arm, BP Patient Position: Sitting)   Pulse 64   Temp 97.7 °F (36.5 °C) (Oral)   Resp 16   Ht 4' 10\" (1.473 m)   Wt 93 lb 9.6 oz (42.5 kg)   SpO2 95%   BMI 19.56 kg/m²        Hearing Screening    125Hz 250Hz 500Hz 1000Hz 2000Hz 3000Hz 4000Hz 6000Hz 8000Hz   Right ear: Fail Fail 40  Fail     Left ear:    Fail 40 40  40        Visual Acuity Screening    Right eye Left eye Both eyes   Without correction:      With correction: 20/20 20/25 20/20        Evaluation of Cognitive Function:  Mood/affect:  happy  Appearance: age appropriate  Family member/caregiver input:     Patient Care Team:  Keaton Mendoza MD as PCP - General (Family Practice)  John Salazar, RN as Ambulatory Care Navigator    Advice/Counseling   Education and counseling provided:  Advance directives counseling/discussion      Assessment/Plan       ICD-10-CM ICD-9-CM    1. Chronic atrial fibrillation (HCC) I48.2 427.31    2. Blood loss anemia D50.0 280.0 CBC WITH AUTOMATED DIFF   3. CKD (chronic kidney disease) stage 3, GFR 30-59 ml/min (HCC) N18.3 585.3    4. Essential hypertension, benign W24 897.1 METABOLIC PANEL, BASIC   5. Encounter for Medicare annual wellness exam Z00.00 V70.0    6. Breast cancer screening Z12.31 V76.10 CYN MAMMO BI SCREENING INCL CAD     Immunization History   Administered Date(s) Administered    Influenza High Dose Vaccine PF 09/19/2016, 09/20/2017    Influenza Vaccine 07/01/2008, 11/12/2013, 10/24/2014, 09/05/2016    Influenza Vaccine (Tri) Adjuvanted 09/25/2018    Influenza Vaccine PF 09/05/2016    Pneumococcal Conjugate (PCV-13) 09/19/2016    Pneumococcal Polysaccharide (PPSV-23) 07/01/2008, 11/26/2014, 09/05/2016    Tdap 08/12/2015, 09/19/2016   Shingrix -discussed   Due for screening mammogranm  Glaucoma check-scheduled tmorrow    5-year personal health care plan:  Continue current medications, take one 81 mg aspirin tablet daily in the morning  Lab today   Consider Shingix immunization at the pharmacy to decrease the likelihood of a shingles attack. Influenza immunization fall 2019. Mammogram ordered  Office follow-up in 6 months. Glaucoma screening tomorrow and at least every 2 years. Annual Medicare wellness evaluation in July 2020  Bring in completed advanced directive form.       Cristhian Brady was provided a written 5-year personalized preventive services plan, which was included in her Lelamiguel Ferguson MD

## 2019-06-27 NOTE — PATIENT INSTRUCTIONS
5-year personal health care plan: 
Continue current medications, take one 81 mg aspirin tablet daily in the morning Lab today Consider Shingix immunization at the pharmacy to decrease the likelihood of a shingles attack. Influenza immunization fall 2020. Mammogram ordered Office follow-up in 6 months. Glaucoma screening tomorrow and at least every 2 years. Annual Medicare wellness evaluation in July 2020 Bring in completed advanced directive form. Well Visit, Over 72: Care Instructions Your Care Instructions Physical exams can help you stay healthy. Your doctor has checked your overall health and may have suggested ways to take good care of yourself. He or she also may have recommended tests. At home, you can help prevent illness with healthy eating, regular exercise, and other steps. Follow-up care is a key part of your treatment and safety. Be sure to make and go to all appointments, and call your doctor if you are having problems. It's also a good idea to know your test results and keep a list of the medicines you take. How can you care for yourself at home? · Reach and stay at a healthy weight. This will lower your risk for many problems, such as obesity, diabetes, heart disease, and high blood pressure. · Get at least 30 minutes of exercise on most days of the week. Walking is a good choice. You also may want to do other activities, such as running, swimming, cycling, or playing tennis or team sports. · Do not smoke. Smoking can make health problems worse. If you need help quitting, talk to your doctor about stop-smoking programs and medicines. These can increase your chances of quitting for good. · Protect your skin from too much sun. When you're outdoors from 10 a.m. to 4 p.m., stay in the shade or cover up with clothing and a hat with a wide brim. Wear sunglasses that block UV rays. Even when it's cloudy, put broad-spectrum sunscreen (SPF 30 or higher) on any exposed skin. · See a dentist one or two times a year for checkups and to have your teeth cleaned. · Wear a seat belt in the car. · Limit alcohol to 2 drinks a day for men and 1 drink a day for women. Too much alcohol can cause health problems. Follow your doctor's advice about when to have certain tests. These tests can spot problems early. For men and women · Cholesterol. Your doctor will tell you how often to have this done based on your overall health and other things that can increase your risk for heart attack and stroke. · Blood pressure. Have your blood pressure checked during a routine doctor visit. Your doctor will tell you how often to check your blood pressure based on your age, your blood pressure results, and other factors. · Diabetes. Ask your doctor whether you should have tests for diabetes. · Vision. Experts recommend that you have yearly exams for glaucoma and other age-related eye problems. · Hearing. Tell your doctor if you notice any change in your hearing. You can have tests to find out how well you hear. · Colon cancer tests. Keep having colon cancer tests as your doctor recommends. You can have one of several types of tests. · Heart attack and stroke risk. At least every 4 to 6 years, you should have your risk for heart attack and stroke assessed. Your doctor uses factors such as your age, blood pressure, cholesterol, and whether you smoke or have diabetes to show what your risk for a heart attack or stroke is over the next 10 years. · Osteoporosis. Talk to your doctor about whether you should have a bone density test to find out whether you have thinning bones. Also ask your doctor about whether you should take calcium and vitamin D supplements. For women · Pap test and pelvic exam. You may no longer need a Pap test. Talk with your doctor about whether to stop or continue to have Pap tests.  
· Breast exam and mammogram. Ask how often you should have a mammogram, which is an X-ray of your breasts. A mammogram can spot breast cancer before it can be felt and when it is easiest to treat. · Thyroid disease. Talk to your doctor about whether to have your thyroid checked as part of a regular physical exam. Women have an increased chance of a thyroid problem. For men · Prostate exam. Talk to your doctor about whether you should have a blood test (called a PSA test) for prostate cancer. Experts disagree on whether men should have this test. Some experts recommend that you discuss the benefits and risks of the test with your doctor. · Abdominal aortic aneurysm. Ask your doctor whether you should have a test to check for an aneurysm. You may need a test if you ever smoked or if your parent, brother, sister, or child has had an aneurysm. When should you call for help? Watch closely for changes in your health, and be sure to contact your doctor if you have any problems or symptoms that concern you. Where can you learn more? Go to http://navin-isidro.info/. Enter R902 in the search box to learn more about \"Well Visit, Over 65: Care Instructions. \" Current as of: March 28, 2018 Content Version: 11.9 © 9694-6239 [employer]. Care instructions adapted under license by [employer] (which disclaims liability or warranty for this information). If you have questions about a medical condition or this instruction, always ask your healthcare professional. [address] any warranty or liability for your use of this information.

## 2019-06-27 NOTE — PROGRESS NOTES
1. Have you been to the ER, urgent care clinic since your last visit? Hospitalized since your last visit? No    2. Have you seen or consulted any other health care providers outside of the [address] [place] [address] [place] since your last visit? Include any pap smears or colon screening. Eye Dr Rene Boyce, Dr. Jasper Pollack 6/27/2019   Do you ever feel afraid of your partner? N   Are you in a relationship with someone who physically or mentally threatens you? N   Is it safe for you to go home? Y     ADL Assessment 6/27/2019   Feeding yourself No Help Needed   Getting from bed to chair No Help Needed   Getting dressed No Help Needed   Bathing or showering No Help Needed   Walk across the room (includes cane/walker) No Help Needed   Using the telphone No Help Needed   Taking your medications No Help Needed   Preparing meals No Help Needed   Managing money (expenses/bills) No Help Needed   Moderately strenuous housework (laundry) No Help Needed   Shopping for personal items (toiletries/medicines) No Help Needed   Shopping for groceries No Help Needed   Driving No Help Needed   Climbing a flight of stairs No Help Needed   Getting to places beyond walking distances No Help Needed     Fall Risk Assessment, last 12 mths 6/27/2019   Able to walk? Yes   Fall in past 12 months?  No   Fall with injury? -   Number of falls in past 12 months -   Fall Risk Score -             Chief Complaint   Patient presents with   Job Hiko Annual Wellness Visit

## 2019-06-28 LAB
BASOPHILS # BLD AUTO: 0 X10E3/UL (ref 0–0.2)
BASOPHILS NFR BLD AUTO: 0 %
BUN SERPL-MCNC: 30 MG/DL (ref 8–27)
BUN/CREAT SERPL: 27 (ref 12–28)
CALCIUM SERPL-MCNC: 10.5 MG/DL (ref 8.7–10.3)
CHLORIDE SERPL-SCNC: 99 MMOL/L (ref 96–106)
CO2 SERPL-SCNC: 32 MMOL/L (ref 20–29)
CREAT SERPL-MCNC: 1.11 MG/DL (ref 0.57–1)
EOSINOPHIL # BLD AUTO: 0.2 X10E3/UL (ref 0–0.4)
EOSINOPHIL NFR BLD AUTO: 3 %
ERYTHROCYTE [DISTWIDTH] IN BLOOD BY AUTOMATED COUNT: 15.8 % (ref 12.3–15.4)
GLUCOSE SERPL-MCNC: 109 MG/DL (ref 65–99)
HCT VFR BLD AUTO: 33.4 % (ref 34–46.6)
HGB BLD-MCNC: 10.9 G/DL (ref 11.1–15.9)
IMM GRANULOCYTES # BLD AUTO: 0 X10E3/UL (ref 0–0.1)
IMM GRANULOCYTES NFR BLD AUTO: 0 %
INTERPRETATION: NORMAL
LYMPHOCYTES # BLD AUTO: 0.6 X10E3/UL (ref 0.7–3.1)
LYMPHOCYTES NFR BLD AUTO: 11 %
MCH RBC QN AUTO: 32.6 PG (ref 26.6–33)
MCHC RBC AUTO-ENTMCNC: 32.6 G/DL (ref 31.5–35.7)
MCV RBC AUTO: 100 FL (ref 79–97)
MONOCYTES # BLD AUTO: 0.4 X10E3/UL (ref 0.1–0.9)
MONOCYTES NFR BLD AUTO: 6 %
NEUTROPHILS # BLD AUTO: 4.5 X10E3/UL (ref 1.4–7)
NEUTROPHILS NFR BLD AUTO: 80 %
PLATELET # BLD AUTO: 204 X10E3/UL (ref 150–450)
POTASSIUM SERPL-SCNC: 3.8 MMOL/L (ref 3.5–5.2)
RBC # BLD AUTO: 3.34 X10E6/UL (ref 3.77–5.28)
SODIUM SERPL-SCNC: 144 MMOL/L (ref 134–144)
WBC # BLD AUTO: 5.7 X10E3/UL (ref 3.4–10.8)

## 2019-07-01 RX ORDER — ROPINIROLE 0.5 MG/1
TABLET, FILM COATED ORAL
Qty: 180 TAB | Refills: 3 | Status: SHIPPED | OUTPATIENT
Start: 2019-07-01 | End: 2019-07-02 | Stop reason: SDUPTHER

## 2019-07-01 NOTE — TELEPHONE ENCOUNTER
Requested Prescriptions     Pending Prescriptions Disp Refills    rOPINIRole (REQUIP) 0.5 mg tablet 180 Tab 3     Sig: take 1 tablet by mouth twice a day     Patient calling to request refill on:      Remaining pills:N/A  Last appt:06/27/2019  Next appt:12/11/2019    Pharmacy:  [address] [place] [address] pkwy. - [id number] [facility],[address]. Patient aware of 72 hour time frame. Pt says that since twice a day wasn't working, Lindsay Henderson recommended that she take it three times a day. She would like that written on the bottle.

## 2019-07-01 NOTE — TELEPHONE ENCOUNTER
Pt's daughter called back to check on the status of her request. She states that the pt is completely out because Dr Merritt Manual increased her dose and she is in need of more medication because her leg is bothering her. Please advise.

## 2019-07-02 ENCOUNTER — PATIENT OUTREACH (OUTPATIENT)
Dept: FAMILY MEDICINE CLINIC | Age: 84
End: 2019-07-02

## 2019-07-02 ENCOUNTER — TELEPHONE (OUTPATIENT)
Dept: FAMILY MEDICINE CLINIC | Age: 84
End: 2019-07-02

## 2019-07-02 DIAGNOSIS — G25.81 RESTLESS LEGS SYNDROME: Primary | ICD-10-CM

## 2019-07-02 RX ORDER — ROPINIROLE 0.5 MG/1
TABLET, FILM COATED ORAL
Qty: 360 TAB | Refills: 5 | Status: SHIPPED | OUTPATIENT
Start: 2019-07-02 | End: 2020-08-25

## 2019-07-02 NOTE — TELEPHONE ENCOUNTER
Discussed med change with patient, patient can take up to 4 times daily (requip); Rx was sent to pharmacy.

## 2019-07-02 NOTE — TELEPHONE ENCOUNTER
Please advise patient/daughter that prescription for Requip has been increased to 0.5 mg 4 times daily and a prescription has been sent to her pharmacy

## 2019-07-02 NOTE — PROGRESS NOTES
.  Patient has graduated from the Transitions of Care Coordination  program on 7/2/2019. Patient's symptoms are stable at this time. Patient/family has the ability to self-manage. Care management goals have been completed at this time. No further nurse navigator follow up scheduled. Goals Addressed                 This Visit's Progress       Post Hospitalization     COMPLETED: Attends follow-up appointments as directed. Keep ABELARDO f/u appointment 5/10/2019 @ 2PM with Dr Shi Minus: Knowledge and adherence of prescribed medication (ie. action, side effects, missed dose, etc.). NN spent over 55 minutes doing medication reconciliation and patient called back again x 20 more. NN type up current ,discontinued and new medications for patient laminated copies for her son to  today. 5/6/2019   Newest coumadin dose  is  6 mg daily with INR every Monday and Thursday       COMPLETED: Prevent complications post hospitalization. Patient will keep ABELARDO follow up appointment 5/10/2019 @ 2pm       COMPLETED: Understands red flags post discharge. S&S of GIB   Tarry black or red stools  Weakness or dizziness with light head  SOB, blurred vision  Abdominal pains            Pt has nurse navigator's contact information for any further questions, concerns, or needs.   Patients upcoming visits:    Future Appointments   Date Time Provider Sachin Cam   12/11/2019 11:00 AM Fidel Masterson MD [facility]

## 2019-07-11 RX ORDER — ISOSORBIDE MONONITRATE 30 MG/1
30 TABLET, EXTENDED RELEASE ORAL DAILY
OUTPATIENT
Start: 2019-07-11

## 2019-07-11 NOTE — TELEPHONE ENCOUNTER
Discussed with patient to get refill of medication from cardiologist who prescribed it; patient stated she understood.

## 2019-07-11 NOTE — TELEPHONE ENCOUNTER
Last seen: 6/27/19  Last filled: 5/5/19  Medication: isosorbide mononitrate ER 30 mg; qty 30 w/no refills

## 2019-07-11 NOTE — TELEPHONE ENCOUNTER
I believe this is prescribed and refilled by the patient's cardiologist, I have not prescribed it before

## 2019-07-12 DIAGNOSIS — Z12.39 BREAST CANCER SCREENING: ICD-10-CM

## 2019-07-19 ENCOUNTER — TELEPHONE (OUTPATIENT)
Dept: FAMILY MEDICINE CLINIC | Age: 84
End: 2019-07-19

## 2019-07-19 NOTE — TELEPHONE ENCOUNTER
Patient is already scheduled today for US biopsy w/ Mary and has an appt to review as well on 7/25/19 per [facility] notes.

## 2019-07-25 ENCOUNTER — TELEPHONE (OUTPATIENT)
Dept: FAMILY MEDICINE CLINIC | Age: 84
End: 2019-07-25

## 2019-07-25 DIAGNOSIS — D05.82 OTHER TYPE OF CARCINOMA IN SITU OF LEFT BREAST: Primary | ICD-10-CM

## 2019-07-25 NOTE — TELEPHONE ENCOUNTER
[facility] called to inform doctor that patient was diagnosed with cancer and that she does have a f/u appointment  Scheduled with Dr. Denzel Vicente and may need a referral.

## 2019-07-29 RX ORDER — ALENDRONATE SODIUM 70 MG/1
TABLET ORAL
Qty: 12 TAB | Refills: 3 | Status: SHIPPED | OUTPATIENT
Start: 2019-07-29 | End: 2020-04-07

## 2019-09-16 ENCOUNTER — PATIENT OUTREACH (OUTPATIENT)
Dept: FAMILY MEDICINE CLINIC | Age: 84
End: 2019-09-16

## 2019-09-16 NOTE — PROGRESS NOTES
.  Hospital Discharge Follow-Up      Date/Time:  9/16/2019 2:59 PM    Patient was admitted to THE [facility] on 9/02/19 and discharged on 9/11 2019 for S/P evacuation of hematoma 09/02/2019 . The physician discharge summary was available at the time of outreach. Ambulatory  attempted to contact patient had to leave a message. ACM noted patient has Dr Kely Damon as her PCP. ACM called his office at [phone] Got clarification that she is a current patient of His. Method of communication with provider :phone    Inpatient RRAT score: . High Risk            21       Total Score        21 Charlson Comorbidity Score (Age + Comorbid Conditions)        Criteria that do not apply:    Has Seen PCP in Last 6 Months (Yes=3, No=0)    . Living with Significant Other. Assisted Living. LTAC. SNF. or   Rehab    Patient Length of Stay (>5 days = 3)    IP Visits Last 12 Months (1-3=4, 4=9, >4=11)    Pt. Coverage (Medicare=5 , Medicaid, or Self-Pay=4)        Was this a readmission?  no   Patient stated reason for the readmission: n/a

## 2019-12-03 RX ORDER — WARFARIN 1 MG/1
1 TABLET ORAL
Refills: 0 | Status: CANCELLED | OUTPATIENT
Start: 2019-12-03

## 2019-12-03 NOTE — TELEPHONE ENCOUNTER
Requested Prescriptions     Pending Prescriptions Disp Refills    warfarin (COUMADIN) 1 mg tablet  0     Sig: Take 1 Tab by mouth. Pharmacy called to request a refill on this med.      Future Appointments   Date Time Provider Sachin Cam   12/11/2019 11:00 AM Yosef Zapata MD [facility]

## 2019-12-03 NOTE — TELEPHONE ENCOUNTER
Left message for pt to rtn call; pt requesting a refill and has an appt w/Dr. Thakkar Antes this month; pt stated previously that she was seeing Dr. Charles Pérez as her PCP; our office needs clarification.

## 2019-12-11 ENCOUNTER — OFFICE VISIT (OUTPATIENT)
Dept: FAMILY MEDICINE CLINIC | Age: 84
End: 2019-12-11

## 2019-12-11 ENCOUNTER — HOSPITAL ENCOUNTER (OUTPATIENT)
Dept: LAB | Age: 84
Discharge: HOME | End: 2019-12-11
Payer: MEDICARE

## 2019-12-11 VITALS
TEMPERATURE: 97.6 F | BODY MASS INDEX: 20.74 KG/M2 | OXYGEN SATURATION: 94 % | HEART RATE: 67 BPM | RESPIRATION RATE: 12 BRPM | WEIGHT: 98.8 LBS | HEIGHT: 58 IN | DIASTOLIC BLOOD PRESSURE: 70 MMHG | SYSTOLIC BLOOD PRESSURE: 116 MMHG

## 2019-12-11 DIAGNOSIS — I10 ESSENTIAL HYPERTENSION, BENIGN: ICD-10-CM

## 2019-12-11 DIAGNOSIS — N18.30 CKD (CHRONIC KIDNEY DISEASE) STAGE 3, GFR 30-59 ML/MIN (HCC): ICD-10-CM

## 2019-12-11 DIAGNOSIS — Z95.2 HISTORY OF HEART VALVE REPLACEMENT: ICD-10-CM

## 2019-12-11 DIAGNOSIS — I48.20 CHRONIC ATRIAL FIBRILLATION (HCC): ICD-10-CM

## 2019-12-11 DIAGNOSIS — R73.02 IMPAIRED GLUCOSE TOLERANCE: ICD-10-CM

## 2019-12-11 DIAGNOSIS — D05.92 CARCINOMA IN SITU OF LEFT BREAST, UNSPECIFIED TYPE: ICD-10-CM

## 2019-12-11 DIAGNOSIS — G47.00 INSOMNIA, UNSPECIFIED TYPE: Primary | ICD-10-CM

## 2019-12-11 PROBLEM — Z79.01 CHRONIC ANTICOAGULATION: Status: RESOLVED | Noted: 2018-08-10 | Resolved: 2019-12-11

## 2019-12-11 LAB
ANION GAP SERPL CALC-SCNC: 5 MMOL/L (ref 3–18)
BUN SERPL-MCNC: 34 MG/DL (ref 7–18)
BUN/CREAT SERPL: 31 (ref 12–20)
CALCIUM SERPL-MCNC: 9.5 MG/DL (ref 8.5–10.1)
CHLORIDE SERPL-SCNC: 102 MMOL/L (ref 100–111)
CO2 SERPL-SCNC: 33 MMOL/L (ref 21–32)
CREAT SERPL-MCNC: 1.08 MG/DL (ref 0.6–1.3)
GLUCOSE SERPL-MCNC: 117 MG/DL (ref 74–99)
POTASSIUM SERPL-SCNC: 3.5 MMOL/L (ref 3.5–5.5)
SODIUM SERPL-SCNC: 140 MMOL/L (ref 136–145)

## 2019-12-11 PROCEDURE — 36415 COLL VENOUS BLD VENIPUNCTURE: CPT

## 2019-12-11 PROCEDURE — 80048 BASIC METABOLIC PNL TOTAL CA: CPT

## 2019-12-11 RX ORDER — TRAZODONE HYDROCHLORIDE 50 MG/1
50 TABLET ORAL
Qty: 90 TAB | Refills: 1 | Status: SHIPPED | OUTPATIENT
Start: 2019-12-11 | End: 2020-03-02

## 2019-12-11 RX ORDER — ANASTROZOLE 1 MG/1
1 TABLET ORAL DAILY
COMMUNITY

## 2019-12-11 NOTE — PROGRESS NOTES
HISTORY OF PRESENT ILLNESS  Star Melton is a 80 y.o. female. Ms. Epifanio Christensen resents for follow-up of multiple chronic health problems including hypertension with a history of cardiomyopathy, congestive heart failure and status post valve replacement as well as a history of atrial fibrillation previously chronically anticoagulated however warfarin was discontinued because of difficulty maintaining safe levels of anticoagulation. She is recently status post surgery for breast cancer. Problems also include chronic kidney disease and impaired glucose tolerance    Follow Up Chronic Condition   The history is provided by the patient and medical records. Pertinent negatives include no chest pain, no abdominal pain, no headaches and no shortness of breath. Mr#: [id number]      Patient Active Problem List   Diagnosis Code    History of heart valve replacement Z95.2    Essential hypertension, benign I10    Other hyperlipidemia E78.49    Atrial fibrillation ([facility] I48.91    Osteoarthritis of knee M17.10    Osteoporosis M81.0    History of total knee replacement Z96.659    Acute congestive heart failure (HCC) I50.9    Cardiomyopathy ([facility] I42.9    Refractory anemia ([facility]) D46.4    Prediabetes R73.03    CKD (chronic kidney disease) stage 3, GFR 30-59 ml/min ([facility]) N18.3    Chronic anticoagulation Z79.01    Gastroesophageal reflux disease K21.9    UGIB (upper gastrointestinal bleed) K92.2         Current Outpatient Medications:     alendronate (FOSAMAX) 70 mg tablet, take 1 tablet by mouth every week WITH 2,000 UNIT VIT D, Disp: 12 Tab, Rfl: 3    rOPINIRole (REQUIP) 0.5 mg tablet, take 1 tablet by mouth 4 times a day, Disp: 360 Tab, Rfl: 5    lisinopril (PRINIVIL, ZESTRIL) 20 mg tablet, Take 1 Tab by mouth daily. , Disp: , Rfl: 0    metoprolol tartrate (LOPRESSOR) 25 mg tablet, Take 1 Tab by mouth daily. , Disp: , Rfl:     atorvastatin (LIPITOR) 20 mg tablet, Take 1 Tab by mouth daily. , Disp: 90 Tab, Rfl: 3    predniSONE (STERAPRED) 5 mg dose pack, Take  by mouth See Admin Instructions. See administration instruction per 5mg dose pack, Disp: , Rfl:     bumetanide (BUMEX) 2 mg tablet, take 1 tablet by mouth once daily, Disp: 90 Tab, Rfl: 1    carvedilol (COREG) 6.25 mg tablet, Take 1 Tab by mouth two (2) times daily (with meals). , Disp: , Rfl: 0    isosorbide mononitrate ER (IMDUR) 30 mg tablet, Take 30 mg by mouth daily. , Disp: , Rfl:     esomeprazole (NEXIUM) 40 mg capsule, take 1 capsule by mouth once daily, Disp: 90 Cap, Rfl: 1    cholecalciferol, VITAMIN D3, (VITAMIN D3) 5,000 unit tab tablet, Take  by mouth daily. , Disp: , Rfl:     spironolactone (ALDACTONE) 25 mg tablet, Take  by mouth daily. , Disp: , Rfl:     multivitamins-minerals-lutein (CENTRUM SILVER) Tab, Take  by mouth.  , Disp: , Rfl:     calcium carbonate (CALTREX) 600 mg (1,500 mg) tablet, Take 600 mg by mouth two (2) times a day.  , Disp: , Rfl:      No Known Allergies    Review of Systems   Constitutional: Negative for fever and weight loss. Respiratory: Positive for sputum production (sometimes at night). Negative for shortness of breath. Cardiovascular: Negative for chest pain, palpitations, orthopnea, leg swelling and PND. Gastrointestinal: Negative for abdominal pain. Musculoskeletal: Positive for joint pain ( Chronic right hip pain, has been to see about arthroplasty and was told she is not a satisfactory surgical candidate because of her age and health problems). Neurological: Negative for dizziness, speech change, focal weakness and headaches. Psychiatric/Behavioral: Negative for depression. The patient has insomnia (takes trazodone). Visit Vitals  /70 (BP 1 Location: Left arm, BP Patient Position: Sitting)   Pulse 67   Temp 97.6 °F (36.4 °C) (Oral)   Resp 12   Ht 4' 10\" (1.473 m)   Wt 98 lb 12.8 oz (44.8 kg)   SpO2 94%   BMI 20.65 kg/m²       Physical Exam  Vitals signs and nursing note reviewed. Constitutional:       Appearance: She is well-developed. HENT:      Head: Normocephalic. Eyes:      Conjunctiva/sclera: Conjunctivae normal.   Neck:      Musculoskeletal: Neck supple. Cardiovascular:      Rate and Rhythm: Normal rate and regular rhythm. Heart sounds: Normal heart sounds. Pulmonary:      Effort: Pulmonary effort is normal.      Breath sounds: Rales ( Bibasilar (chronic)) present. Musculoskeletal:      Right lower leg: No edema. Left lower leg: No edema. Skin:     General: Skin is warm and dry. Neurological:      Mental Status: She is alert and oriented to person, place, and time. Psychiatric:         Behavior: Behavior normal.         ASSESSMENT and PLAN    ICD-10-CM ICD-9-CM    1. Insomnia, unspecified type G47.00 780.52 traZODone (DESYREL) 50 mg tablet   2. CKD (chronic kidney disease) stage 3, GFR 30-59 ml/min ([facility]) G67.1 094.8 METABOLIC PANEL, BASIC   3. Essential hypertension, benign T42 091.6 METABOLIC PANEL, BASIC   4. Chronic atrial fibrillation I48.20 427.31    5. History of heart valve replacement Z95.2 V43.3    6. Carcinoma in situ of left breast, unspecified type D05.92 233.0    7.  Impaired glucose tolerance R73.02 790.22    Lab today, further disposition pending lab results  Continue current medications  Return for follow-up and Medicare wellness evaluation in 6 months

## 2019-12-11 NOTE — PROGRESS NOTES
Is advised that lab results are stable with no evidence of new problems or worsening of chronic problems

## 2019-12-11 NOTE — PATIENT INSTRUCTIONS
Lab today, further disposition pending lab results Continue current medications Return for follow-up and Medicare wellness evaluation in 6 months Medicines to Avoid With Kidney Disease: Care Instructions Your Care Instructions Kidney disease means that your kidneys are not able to get rid of waste from the blood. So they can't keep your body's fluids and chemicals in balance. Usually, the kidneys get rid of waste from the blood through the urine. And they balance the fluids in the body. When your kidneys don't work as they should, you have to be careful about some medicines. They may harm your kidneys. Your doctor may tell you not to take them. Or he or she may change the dose. Medicines for pain and swelling, such as ibuprofen (Advil or Motrin) or naproxen (Aleve), can cause harm. So can some antibiotics and antacids. And you need to be careful about some drugs that treat cancer, lower blood pressure, or get rid of water from the body. Some herbal products could cause harm too. Follow-up care is a key part of your treatment and safety. Be sure to make and go to all appointments, and call your doctor if you are having problems. It's also a good idea to know your test results and keep a list of the medicines you take. How can you care for yourself at home? · Tell your doctor all the prescription, herbal, or over-the-counter medicines you take. Do not take any new ones unless you talk to your doctor first. 
· Do not take anti-inflammatory medicines. These include ibuprofen (Advil, Motrin) and naproxen (Aleve). You can use acetaminophen (Tylenol) for pain. · Do not take two or more pain medicines at the same time unless the doctor told you to. Many pain medicines have acetaminophen, which is Tylenol. Too much acetaminophen (Tylenol) can be harmful. · Tell all doctors and others who work with your health care that you have kidney disease. · Wear medical alert jewelry that lists your health problem. You can buy this at most drugstores. Where can you learn more? Go to http://navin-isidro.info/. Enter S010 in the search box to learn more about \"Medicines to Avoid With Kidney Disease: Care Instructions. \" Current as of: October 31, 2018 Content Version: 12.2 © 5710-5364 [employer]. Care instructions adapted under license by [employer] (which disclaims liability or warranty for this information). If you have questions about a medical condition or this instruction, always ask your healthcare professional. [place] [address] any warranty or liability for your use of this information.

## 2019-12-11 NOTE — PROGRESS NOTES
Belkys Lerner is a 80 y.o. female (: 1930) presenting to address:    Chief Complaint   Patient presents with    Medication Refill       Vitals:    19 1039   BP: 116/70   Pulse: 67   Resp: 12   Temp: 97.6 °F (36.4 °C)   TempSrc: Oral   SpO2: 94%   Weight: 98 lb 12.8 oz (44.8 kg)   Height: 4' 10\" (1.473 m)   PainSc:   0 - No pain       Hearing/Vision:   No exam data present    Learning Assessment:     Learning Assessment 3/6/2015   PRIMARY LEARNER Patient   HIGHEST LEVEL OF EDUCATION - PRIMARY LEARNER  DID NOT GRADUATE HIGH SCHOOL   BARRIERS PRIMARY LEARNER NONE   CO-LEARNER CAREGIVER No   PRIMARY LANGUAGE ENGLISH    NEED No   LEARNER PREFERENCE PRIMARY DEMONSTRATION   ANSWERED BY Patient   RELATIONSHIP SELF     Depression Screening:     3 most recent PHQ Screens 2019   Little interest or pleasure in doing things Not at all   Feeling down, depressed, irritable, or hopeless Not at all   Total Score PHQ 2 0     Fall Risk Assessment:     Fall Risk Assessment, last 12 mths 2019   Able to walk? Yes   Fall in past 12 months? Yes   Fall with injury? Yes   Number of falls in past 12 months 1   Fall Risk Score 2     Abuse Screening:     Abuse Screening Questionnaire 2019   Do you ever feel afraid of your partner? N   Are you in a relationship with someone who physically or mentally threatens you? N   Is it safe for you to go home? Y     Coordination of Care Questionaire:   1. Have you been to the ER, urgent care clinic since your last visit? Hospitalized since your last visit? YES, [facility] for fall    2. Have you seen or consulted any other health care providers outside of the [address] [place] [address] since your last visit? Include any pap smears or colon screening. YES, breast surgery    Advanced Directive:   1. Do you have an Advanced Directive? NO    2. Would you like information on Advanced Directives?  NO

## 2019-12-12 NOTE — TELEPHONE ENCOUNTER
Patient had appt w/PCP on 12/11/2019; meds were discussed at this appt. Patient is no longer on coumadin regimen.

## 2019-12-17 ENCOUNTER — TELEPHONE (OUTPATIENT)
Dept: FAMILY MEDICINE CLINIC | Age: 84
End: 2019-12-17

## 2019-12-17 NOTE — TELEPHONE ENCOUNTER
Patient was admitted to THE [facility] on 12/16/19 and discharged on 12/17/19 for CHF. Patient needs to schedule a hospital f/u but Dr. Aram So doesn't have an available slot until 1/16/20.  Please advise

## 2019-12-18 ENCOUNTER — PATIENT OUTREACH (OUTPATIENT)
Dept: FAMILY MEDICINE CLINIC | Age: 84
End: 2019-12-18

## 2019-12-18 NOTE — PROGRESS NOTES
Hospital Discharge Follow-Up      Date/Time:  2019 1:49 PM    Patient was admitted to THE [facility] on 19 and discharged on 19 for Acute on chronic congested heart failure. . The physician discharge summary was not available at the time of outreach. Patient was contacted within 1 business day of discharge. Care Transition Nurse (CTN) contacted the patient by telephone to perform post hospital discharge assessment. Verified name and  with patient as identifiers. Provided introduction to self, and explanation of the CTN role. Patient states that she is feeling better. Patient denied chest pain, shortness of breath, fever and chills. Patient is aware that she needs to follow up with PCP and Cardiology. D/C AVS reviewed with Pt. Cardiac diet, Less than 2 Gram sodium /day importance  reviewed with Pt. Patient reminded that there is a physician on call 24 hours a day / 7 days a week (M-F 5pm to 8am and from Friday 5pm until Monday 8a for the weekend) should the patient have questions or concerns. Patient reminded to call 911 if situation is emergent ( such as chest pain, shortness of breath, unstoppable bleeding, feeling of passing out,  worsening of symptoms)or patient feels the situation is emergent. Pt verbalizes understanding. Patient received hospital discharge instructions. CTN reviewed discharge instructions and red flags with patient who verbalized understanding. Patient given an opportunity to ask questions and does not have any further questions or concerns at this time. The patient agrees to contact the PCP office for questions related to their healthcare. CTN provided contact information for future reference. [facility] follow up appointment(s):   Future Appointments   Date Time Provider Sachin Cam   6/10/2020 11:00 AM Sarah Poe MD [facility] Understands red flags post discharge.       Patient will call her Cardiologist for weight gain of 3 lbs in 1 day and 5 lbs in a week, feeling uneasy and feeling of something is not right, harder to breath when lying down, more swelling of feet, abdomen, ankles and legs , feeling more tired , no energy , dizziness and dry hacking cough. Patient will go to the nearest emergency room for chest pain, shortness of breath, returning of symptoms that brought her to the emergency room and/or worsening of symptoms. Patient will contact PCP office for any questions or concerns related to healthcare           Patient kept the conversation short as she states that she is in the middle of fixing a meal. Patient ended the call.

## 2019-12-19 ENCOUNTER — TELEPHONE (OUTPATIENT)
Dept: FAMILY MEDICINE CLINIC | Age: 84
End: 2019-12-19

## 2020-01-13 ENCOUNTER — PATIENT OUTREACH (OUTPATIENT)
Dept: FAMILY MEDICINE CLINIC | Facility: CLINIC | Age: 85
End: 2020-01-13

## 2020-01-13 NOTE — PROGRESS NOTES
Transition Of Care Follow Up. Care Coordinator(CC) contacted the patient by telephone to perform post hospital discharge assessment. Verified name and  with patient as identifiers. Provided introduction to self, and explanation of the Care Coordinator role. Patient states he is doing well. She denies any chest pain, sob or swelling. No assistance is needed at this time. Patient reminded that there are physicians on call 24 hours a day / 7 days a week (M-F 5pm to 8am and from Friday 5pm until Monday 8a for the weekend) should the patient have questions or concerns. Patient reminded to call 911 if situation is emergent or patient feels the situation is emergent. Pt verbalizes understanding. Goals      Understands red flags post discharge. Patient will call her Cardiologist for weight gain of 3 lbs in 1 day and 5 lbs in a week, feeling uneasy and feeling of something is not right, harder to breath when lying down, more swelling of feet, abdomen, ankles and legs , feeling more tired , no energy , dizziness and dry hacking cough. Patient will go to the nearest emergency room for chest pain, shortness of breath, returning of symptoms that brought her to the emergency room and/or worsening of symptoms.    Patient will contact PCP office for any questions or concerns related to healthcare          Future Appointments   Date Time Provider Sachin Cam   6/10/2020 11:00 AM Brittany Berger MD [facility]

## 2020-01-17 RX ORDER — ESOMEPRAZOLE MAGNESIUM 40 MG/1
CAPSULE, DELAYED RELEASE ORAL
Qty: 90 CAP | Refills: 1 | Status: SHIPPED | OUTPATIENT
Start: 2020-01-17 | End: 2020-04-17

## 2020-01-20 ENCOUNTER — PATIENT OUTREACH (OUTPATIENT)
Dept: FAMILY MEDICINE CLINIC | Facility: CLINIC | Age: 85
End: 2020-01-20

## 2020-01-20 NOTE — PROGRESS NOTES
Patient has graduated from the Transitions of Care Coordination  program on 1/20/2020. Patient's symptoms are stable at this time. Patient/family has the ability to self-manage. Care management goals have been completed at this time. No further care coordinator follow up scheduled. Goals Addressed                 This Visit's Progress     COMPLETED: Understands red flags post discharge. Patient will call her Cardiologist for weight gain of 3 lbs in 1 day and 5 lbs in a week, feeling uneasy and feeling of something is not right, harder to breath when lying down, more swelling of feet, abdomen, ankles and legs , feeling more tired , no energy , dizziness and dry hacking cough. Patient will go to the nearest emergency room for chest pain, shortness of breath, returning of symptoms that brought her to the emergency room and/or worsening of symptoms.    Patient will contact PCP office for any questions or concerns related to healthcare          Patients upcoming visits:    Future Appointments   Date Time Provider Sachin Cam   6/10/2020 11:00 AM Karolina Ibrahim MD [facility]

## 2020-01-24 ENCOUNTER — TELEPHONE (OUTPATIENT)
Dept: FAMILY MEDICINE CLINIC | Age: 85
End: 2020-01-24

## 2020-01-24 NOTE — TELEPHONE ENCOUNTER
Spoke w/Mariaa from [employer] about fax received requesting DME for pt; the order was not generated from Dr. Tyrone Friedman; pt was seen at [facility]; Arnoldo Martinez stated she will investigate to see where the actual order came from.

## 2020-02-14 RX ORDER — ATORVASTATIN CALCIUM 20 MG/1
TABLET, FILM COATED ORAL
Qty: 90 TAB | Refills: 3 | Status: SHIPPED | OUTPATIENT
Start: 2020-02-14

## 2020-03-01 NOTE — PROGRESS NOTES
HISTORY OF PRESENT ILLNESS  Patt Watkins is a 80 y.o. female. She presents for follow-up of multiple chronic health problems including hypertension with a history of cardiomyopathy, congestive heart failure and status post valve replacement as well as a history of atrial fibrillation previously chronically anticoagulated however warfarin was discontinued because of difficulty maintaining safe levels of anticoagulation. She is recently status post surgery for breast cancer. Problems also include chronic kidney disease and impaired glucose tolerance. She was admitted to [facility] in December. Admit Date: 12/16/2019    Per admitting physician, Almaz Gautam MD  \" Allison Love is a 80 y.o. female with a history of chronic afib primary cardiologist is Dr. Mirian Ring, RLS , HTN, CHF with last known EF 20% 2016, prediabetes prior h/o AI and MR with both atrial and mitral valve replacement valve replacement on chronic coumadin, hyperlipidemia, HTN, prior history of peptic ulcer disease, recent left breast CA with mastectomy 8/26/2019 and hematoma evaluation 9/2/2019, Bi ventricular pacemaker 12/2016 for AV block, chronic systolic HF, GERD, prediabetes, CKD stage 3 who is being admitted for 2-day history of increasing shortness of breath. Patient states that she has been having some palpitations and shortness of breath. She states that she weighs herself on a daily basis and weighs anywhere from 93 to 95 pounds. Today she states that she waited not only 94 pounds. She said that she has some shortness of breath. But she is minimally ambulatory due to left and hip pain and normally ambulates with assistance with a walker outside the hospital outside the house. She presented with her  when I evaluate the patient she was by her bedside with her daughter. In the emergency room her vitals were unremarkable.  Patient did report that she was recently eating beans with galvan and also eating greens which may explain her lower INR.     Her vital signs showed regular temperature 98.7, blood pressure 135/74, heart rate 72, respirations 20, and O2 sats are 97 to 98% on room air. She weighed 96 pounds.     Her CBC was unremarkable with an H&H of 12.2 and 37.6. With normal white cell count. With neutrophils of 83%. Her INR is 1.84. Her chemistries were normal. Her proBNP was 14,124, and her troponin is less than 0.01. Her chest x-ray PA and lateral showed bilateral pulmonary edema with left pleural effusion per my read.     Patient had a CT angiogram of the chest which showed no evidence of PE, aortic aneurysm or dissection. Patient had moderate to severe cardiomegaly with valve replacement. And mild pleural effusions with groundglass interstitial lung process but favoring edema. In the emergency room patient received 80 mg of IV Lasix with diuresis of at least half a liter immediately. She did have an episode of incontinence. She denies any recent other problems. Her EKG showed dual paced pacemaker with pacemaker spikes. \"     Hospital Course:     Acute on chronic systolic heart failure, EF 29%  - received iv diuresis with improvement in her symptoms, patient reports no more SOB, no CP , walked 150 ft with PT   - reports that recently had some dietary indiscretion and suspect this is what triggered her exacerbation. Educated on 2 gm Na restriction   - c/w home po diuretic regimen, Lisinopril, Coreg, Imdur, ASA and Lipitor     History of aortic valve replacement and mitral valve replacement with chronic atrial fibrillation on chronic Coumadin therapy  - c/w chronic Coumadin  - INR is subtherapeutic, 1.7, reports that she had some greens; she reports that she takes 6 mg qd chronically and her INR have been ok; she has 2 mg tabs at home and I asked her to take 8 mg x 2 days, the Go back to 6 mg qd.  She has appt with Coumadin clinic on 12/23     Hypokalemia due to lasix iv, replaced    CKD stage 3- creatinine stable     Hyperlipidemia- c/w statin     History of left breast mastectomy -Continue Arimidex        It is felt that the patient has achieved maximal medical benefit from stay in the hospital .  She will be discharged with the above mentioned instructions and medications     Discharge Date: 12/17/2019         Today she reports episodes of feeling unsteady, seems to deny vertigo type symptoms. It is noted that she is back on warfarin apparently prescribed by her cardiology consultant. Mr#: [id number]      Patient Active Problem List   Diagnosis Code    History of heart valve replacement Z95.2    Essential hypertension, benign I10    Other hyperlipidemia E78.49    Atrial fibrillation ([facility] I48.91    Osteoarthritis of knee M17.10    Osteoporosis M81.0    History of total knee replacement Z96.659    Acute congestive heart failure (HCC) I50.9    Cardiomyopathy ([facility] I42.9    Refractory anemia ([facility]) D46.4    Prediabetes R73.03    CKD (chronic kidney disease) stage 3, GFR 30-59 ml/min ([facility]) N18.3    Gastroesophageal reflux disease K21.9    UGIB (upper gastrointestinal bleed) K92.2    Insomnia G47.00    Carcinoma in situ of left breast D05.92    Impaired glucose tolerance R73.02         Current Outpatient Medications:     atorvastatin (LIPITOR) 20 mg tablet, take 1 tablet by mouth once daily, Disp: 90 Tab, Rfl: 3    esomeprazole (NEXIUM) 40 mg capsule, take 1 capsule by mouth once daily, Disp: 90 Cap, Rfl: 1    anastrozole (ARIMIDEX) 1 mg tablet, Take 1 mg by mouth daily. , Disp: , Rfl:     traZODone (DESYREL) 50 mg tablet, Take 1 Tab by mouth nightly., Disp: 90 Tab, Rfl: 1    alendronate (FOSAMAX) 70 mg tablet, take 1 tablet by mouth every week WITH 2,000 UNIT VIT D, Disp: 12 Tab, Rfl: 3    rOPINIRole (REQUIP) 0.5 mg tablet, take 1 tablet by mouth 4 times a day, Disp: 360 Tab, Rfl: 5    lisinopril (PRINIVIL, ZESTRIL) 20 mg tablet, Take 1 Tab by mouth daily. , Disp: , Rfl: 0    metoprolol tartrate (LOPRESSOR) 25 mg tablet, Take 1 Tab by mouth daily. , Disp: , Rfl:     bumetanide (BUMEX) 2 mg tablet, take 1 tablet by mouth once daily, Disp: 90 Tab, Rfl: 1    carvedilol (COREG) 6.25 mg tablet, Take 1 Tab by mouth two (2) times daily (with meals). , Disp: , Rfl: 0    isosorbide mononitrate ER (IMDUR) 30 mg tablet, Take 30 mg by mouth daily. , Disp: , Rfl:     cholecalciferol, VITAMIN D3, (VITAMIN D3) 5,000 unit tab tablet, Take  by mouth daily. , Disp: , Rfl:     spironolactone (ALDACTONE) 25 mg tablet, Take  by mouth daily. , Disp: , Rfl:     multivitamins-minerals-lutein (CENTRUM SILVER) Tab, Take  by mouth.  , Disp: , Rfl:     calcium carbonate (CALTREX) 600 mg (1,500 mg) tablet, Take 600 mg by mouth two (2) times a day.  , Disp: , Rfl:      No Known Allergies     Review of Systems   Constitutional: Negative for fever and weight loss. Eyes: Negative for blurred vision and double vision. Respiratory: Negative for shortness of breath. Cardiovascular: Negative for chest pain, palpitations, orthopnea, leg swelling and PND. Gastrointestinal: Negative for abdominal pain. Genitourinary: Negative for dysuria, frequency and urgency. Neurological: Positive for dizziness. Negative for speech change, focal weakness and headaches. Visit Vitals  /72 (BP 1 Location: Right arm, BP Patient Position: Sitting) Comment: manual   Pulse 64   Temp 97.4 °F (36.3 °C) (Oral)   Resp 18   Ht 4' 10\" (1.473 m)   Wt 97 lb (44 kg)   SpO2 94%   BMI 20.27 kg/m²       Physical Exam  Vitals signs and nursing note reviewed. Constitutional:       Appearance: She is well-developed. HENT:      Head: Normocephalic. Right Ear: Tympanic membrane and ear canal normal.      Left Ear: Tympanic membrane and ear canal normal.      Mouth/Throat:      Mouth: Mucous membranes are moist.      Pharynx: Oropharynx is clear. Eyes:      Extraocular Movements: Extraocular movements intact.       Conjunctiva/sclera: Conjunctivae normal.   Neck:      Musculoskeletal: Neck supple. Vascular: No carotid bruit. Cardiovascular:      Rate and Rhythm: Normal rate and regular rhythm. Heart sounds: Normal heart sounds. Comments: Heart sounds consistent with mechanical valve  Pulmonary:      Effort: Pulmonary effort is normal.      Breath sounds: Rales ( Bibasilar crackles) present. Musculoskeletal:      Right lower leg: No edema. Left lower leg: No edema. Lymphadenopathy:      Cervical: No cervical adenopathy. Skin:     General: Skin is warm and dry. Neurological:      General: No focal deficit present. Mental Status: She is alert and oriented to person, place, and time. Comments: No nystagmus   Psychiatric:         Mood and Affect: Mood normal.         Behavior: Behavior normal.         Thought Content: Thought content normal.         ASSESSMENT and PLAN    ICD-10-CM ICD-9-CM    1. Essential hypertension, benign I10 749.6 METABOLIC PANEL, BASIC      carvediloL (COREG) 12.5 mg tablet   2. Chronic atrial fibrillation I48.20 427.31    3. Chronic systolic congestive heart failure (HCC) X27.16 943.31 METABOLIC PANEL, BASIC     428.0 NT-PRO BNP      carvediloL (COREG) 12.5 mg tablet   4. History of heart valve replacement Z95.2 V43.3    5. CKD (chronic kidney disease) stage 3, GFR 30-59 ml/min ([facility]) N18.3 585.3 CBC WITH AUTOMATED DIFF      METABOLIC PANEL, BASIC   6. Impaired glucose tolerance R73.02 790.22    7. Infiltrating ductal carcinoma of left breast ([facility]) C50.912 174.9    Lab today, further disposition pending lab results of indicated  Increase carvedilol to 12.5 mg twice daily  Continue other medications as before  Return for follow-up in 3 weeks, sooner with any problems      PLEASE NOTE:   This document has been produced using voice recognition software. Unrecognized errors in transcription may be present.

## 2020-03-02 ENCOUNTER — OFFICE VISIT (OUTPATIENT)
Dept: FAMILY MEDICINE CLINIC | Age: 85
End: 2020-03-02

## 2020-03-02 VITALS
DIASTOLIC BLOOD PRESSURE: 72 MMHG | HEIGHT: 58 IN | TEMPERATURE: 97.4 F | SYSTOLIC BLOOD PRESSURE: 162 MMHG | RESPIRATION RATE: 18 BRPM | BODY MASS INDEX: 20.36 KG/M2 | HEART RATE: 64 BPM | OXYGEN SATURATION: 94 % | WEIGHT: 97 LBS

## 2020-03-02 DIAGNOSIS — I10 ESSENTIAL HYPERTENSION, BENIGN: Primary | ICD-10-CM

## 2020-03-02 DIAGNOSIS — N18.30 CKD (CHRONIC KIDNEY DISEASE) STAGE 3, GFR 30-59 ML/MIN (HCC): ICD-10-CM

## 2020-03-02 DIAGNOSIS — I50.22 CHRONIC SYSTOLIC CONGESTIVE HEART FAILURE (HCC): ICD-10-CM

## 2020-03-02 DIAGNOSIS — Z95.2 HISTORY OF HEART VALVE REPLACEMENT: ICD-10-CM

## 2020-03-02 DIAGNOSIS — R73.02 IMPAIRED GLUCOSE TOLERANCE: ICD-10-CM

## 2020-03-02 DIAGNOSIS — I48.20 CHRONIC ATRIAL FIBRILLATION (HCC): ICD-10-CM

## 2020-03-02 DIAGNOSIS — C50.912 INFILTRATING DUCTAL CARCINOMA OF LEFT BREAST (HCC): ICD-10-CM

## 2020-03-02 PROBLEM — D46.4 REFRACTORY ANEMIA (HCC): Status: RESOLVED | Noted: 2018-04-03 | Resolved: 2020-03-02

## 2020-03-02 RX ORDER — ASPIRIN 81 MG/1
TABLET ORAL DAILY
COMMUNITY

## 2020-03-02 RX ORDER — WARFARIN 6 MG/1
6 TABLET ORAL
COMMUNITY
End: 2020-12-31 | Stop reason: DRUGHIGH

## 2020-03-02 RX ORDER — CARVEDILOL 12.5 MG/1
12.5 TABLET ORAL 2 TIMES DAILY WITH MEALS
Qty: 60 TAB | Refills: 0 | Status: SHIPPED | OUTPATIENT
Start: 2020-03-02 | End: 2020-04-08

## 2020-03-02 RX ORDER — WARFARIN 2 MG/1
2 TABLET ORAL
Refills: 0 | COMMUNITY
Start: 2019-12-03 | End: 2020-12-31 | Stop reason: DRUGHIGH

## 2020-03-02 NOTE — PROGRESS NOTES
Sai Dee is a 80 y.o. female (: 1930) presenting to address:    Chief Complaint   Patient presents with    Dizziness     x three weeks       Vitals:    20 1416 20 1426   BP: 174/72 160/70   Pulse: 64    Resp: 18    Temp: 97.4 °F (36.3 °C)    TempSrc: Oral    SpO2: 94%    Weight: 97 lb (44 kg)    Height: 4' 10\" (1.473 m)    PainSc:   0 - No pain        Hearing/Vision:   No exam data present    Learning Assessment:     Learning Assessment 3/6/2015   PRIMARY LEARNER Patient   HIGHEST LEVEL OF EDUCATION - PRIMARY LEARNER  DID NOT GRADUATE HIGH SCHOOL   BARRIERS PRIMARY LEARNER NONE   CO-LEARNER CAREGIVER No   PRIMARY LANGUAGE ENGLISH    NEED No   LEARNER PREFERENCE PRIMARY DEMONSTRATION   ANSWERED BY Patient   RELATIONSHIP SELF     Depression Screening:     3 most recent PHQ Screens 3/2/2020   Little interest or pleasure in doing things Not at all   Feeling down, depressed, irritable, or hopeless Not at all   Total Score PHQ 2 0     Fall Risk Assessment:     Fall Risk Assessment, last 12 mths 3/2/2020   Able to walk? Yes   Fall in past 12 months? Yes   Fall with injury? No   Number of falls in past 12 months 1   Fall Risk Score 1     Abuse Screening:     Abuse Screening Questionnaire 2019   Do you ever feel afraid of your partner? N   Are you in a relationship with someone who physically or mentally threatens you? N   Is it safe for you to go home? Y     Coordination of Care Questionaire:   1. Have you been to the ER, urgent care clinic since your last visit? Hospitalized since your last visit? NO    2. Have you seen or consulted any other health care providers outside of the [address] [place] [address] since your last visit? Include any pap smears or colon screening. YES Dr. Marleni Smith    Advanced Directive:   1. Do you have an Advanced Directive? NO    2. Would you like information on Advanced Directives?  NO

## 2020-03-02 NOTE — PATIENT INSTRUCTIONS
Lab today, further disposition pending lab results of indicated Increase carvedilol to 12.5 mg twice daily Continue other medications as before Return for follow-up in 3 weeks, sooner with any problems

## 2020-03-11 DIAGNOSIS — G47.00 INSOMNIA, UNSPECIFIED TYPE: ICD-10-CM

## 2020-03-11 RX ORDER — TRAZODONE HYDROCHLORIDE 50 MG/1
TABLET ORAL
Qty: 90 TAB | Refills: 1 | Status: SHIPPED | OUTPATIENT
Start: 2020-03-11 | End: 2020-09-10

## 2020-03-20 ENCOUNTER — TELEPHONE (OUTPATIENT)
Dept: FAMILY MEDICINE CLINIC | Age: 85
End: 2020-03-20

## 2020-03-20 NOTE — TELEPHONE ENCOUNTER
Please advise the patient that I am concerned about the risk associated with her coming to the office for her appointment on 2/23/2020 in view of the coronavirus pandemic. She is at especially high risk because of of her age and chronic health problems and should avoid places like this where there is an increased likelihood of being exposed to the virus. Also she did not have the lab studies drawn recommended at her most recent visit that I need to assess her situation.   If she is feeling reasonably well it might be best to postpone her office follow-up

## 2020-03-23 NOTE — TELEPHONE ENCOUNTER
Spoke w/pt and advised her of not coming to appt today w/the pandemic, her age and health issues; patient states she did complete her labs at her last visit and will call back later if she needs refills.

## 2020-03-23 NOTE — TELEPHONE ENCOUNTER
Please advised that I was able to find her lab results in the [facility] system and the results appear to be stable

## 2020-04-06 ENCOUNTER — TELEPHONE (OUTPATIENT)
Dept: FAMILY MEDICINE CLINIC | Age: 85
End: 2020-04-06

## 2020-04-06 NOTE — TELEPHONE ENCOUNTER
Pharmacy calling for clarification of ropinirole 0.5 mg tab; if the sig is for pt to take 4 times a day, it will require a prior auth; please clarify.

## 2020-04-07 RX ORDER — ALENDRONATE SODIUM 70 MG/1
TABLET ORAL
Qty: 12 TAB | Refills: 3 | Status: SHIPPED | OUTPATIENT
Start: 2020-04-07 | End: 2020-07-20

## 2020-04-08 DIAGNOSIS — I50.22 CHRONIC SYSTOLIC CONGESTIVE HEART FAILURE (HCC): ICD-10-CM

## 2020-04-08 DIAGNOSIS — I10 ESSENTIAL HYPERTENSION, BENIGN: ICD-10-CM

## 2020-04-08 RX ORDER — CARVEDILOL 12.5 MG/1
TABLET ORAL
Qty: 60 TAB | Refills: 2 | Status: CANCELLED | OUTPATIENT
Start: 2020-04-08

## 2020-04-08 RX ORDER — CARVEDILOL 12.5 MG/1
TABLET ORAL
Qty: 60 TAB | Refills: 2 | Status: SHIPPED | OUTPATIENT
Start: 2020-04-08 | End: 2020-07-07 | Stop reason: SDUPTHER

## 2020-04-17 RX ORDER — ESOMEPRAZOLE MAGNESIUM 40 MG/1
CAPSULE, DELAYED RELEASE ORAL
Qty: 90 CAP | Refills: 1 | Status: SHIPPED | OUTPATIENT
Start: 2020-04-17 | End: 2020-07-12

## 2020-06-10 ENCOUNTER — VIRTUAL VISIT (OUTPATIENT)
Dept: FAMILY MEDICINE CLINIC | Age: 85
End: 2020-06-10

## 2020-06-10 DIAGNOSIS — N18.30 CKD (CHRONIC KIDNEY DISEASE) STAGE 3, GFR 30-59 ML/MIN (HCC): ICD-10-CM

## 2020-06-10 DIAGNOSIS — R73.02 IMPAIRED GLUCOSE TOLERANCE: ICD-10-CM

## 2020-06-10 DIAGNOSIS — E78.49 OTHER HYPERLIPIDEMIA: ICD-10-CM

## 2020-06-10 DIAGNOSIS — I10 ESSENTIAL HYPERTENSION, BENIGN: Primary | ICD-10-CM

## 2020-06-10 DIAGNOSIS — Z95.2 HISTORY OF HEART VALVE REPLACEMENT: ICD-10-CM

## 2020-06-10 DIAGNOSIS — G47.00 INSOMNIA, UNSPECIFIED TYPE: ICD-10-CM

## 2020-06-10 NOTE — PROGRESS NOTES
Kathy George is a 80 y.o. female who was seen by synchronous telephone technology  6/10/2020. Mr#: [id number]    Consent:  She and/or her healthcare decision maker is aware that this patient-initiated Telehealth encounter is a billable service, with coverage as determined by her insurance carrier. She is aware that she may receive a bill and has provided verbal consent to proceed: YES    I was in the office while conducting this encounter. 712  HPI/Subjective:   She presents for follow-up with a history of hypertension, hyperlipidemia, valvular heart disease status post valve replacement, stage III chronic kidney disease and for Medicare wellness evaluation. She is followed by cardiology. She continues to report insomnia but has been afraid to take the prescribed trazodone after her daughter told her it would not be safe with her history of cardiac issues.           Patient Active Problem List   Diagnosis Code    History of heart valve replacement Z95.2    Essential hypertension, benign I10    Other hyperlipidemia E78.49    Atrial fibrillation ([facility] [address] I48.91    Osteoarthritis of knee M17.10    Osteoporosis M81.0    History of total knee replacement Z96.659    Acute congestive heart failure (HCC) I50.9    Cardiomyopathy ([facility] [address] I42.9    Prediabetes R73.03    CKD (chronic kidney disease) stage 3, GFR 30-59 ml/min ([facility]) N18.3    Gastroesophageal reflux disease K21.9    UGIB (upper gastrointestinal bleed) K92.2    Insomnia G47.00    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 , Disp: , Rfl:     warfarin (COUMADIN) 6 mg tablet, Take 6 mg by mouth., Disp: , Rfl:     warfarin (COUMADIN) 2 mg tablet, 2 mg., Disp: , Rfl: 0    atorvastatin (LIPITOR) 20 mg tablet, take 1 tablet by mouth once daily, Disp: 90 Tab, Rfl: 3    anastrozole (ARIMIDEX) 1 mg tablet, Take 1 mg by mouth daily. , Disp: , Rfl:     rOPINIRole (REQUIP) 0.5 mg tablet, take 1 tablet by mouth 4 times a day, Disp: 360 Tab, Rfl: 5    lisinopril (PRINIVIL, ZESTRIL) 20 mg tablet, Take 5 mg by mouth daily. , Disp: , Rfl: 0    bumetanide (BUMEX) 2 mg tablet, take 1 tablet by mouth once daily, Disp: 90 Tab, Rfl: 1    isosorbide mononitrate ER (IMDUR) 30 mg tablet, Take 30 mg by mouth daily. , Disp: , Rfl:     cholecalciferol, VITAMIN D3, (VITAMIN D3) 5,000 unit tab tablet, Take  by mouth daily. , Disp: , Rfl:     multivitamins-minerals-lutein (CENTRUM SILVER) Tab, Take  by mouth.  , Disp: , Rfl:     calcium carbonate (CALTREX) 600 mg (1,500 mg) tablet, Take 600 mg by mouth two (2) times a day.  , Disp: , Rfl:      No Known Allergies      Review of Systems   Constitutional: Positive for malaise/fatigue ( Reports lack of energy, does not have the energy for projects that she used to have, sometimes feels tired and would prefer not to have to cook) and weight loss ( Patient consistently reports weight loss but has not actually lost a significant amount of weight in the past 2 years). Negative for chills and fever. HENT: Negative for congestion, hearing loss and sore throat. Eyes: Negative for blurred vision and double vision. Respiratory: Negative for cough, shortness of breath and wheezing. Cardiovascular: Positive for leg swelling ( Ankle swell if she has been on her feet for long periods of time). Negative for chest pain, palpitations and orthopnea.    Gastrointestinal: Negative for abdominal pain, blood in stool, constipation, diarrhea, heartburn, melena, nausea and vomiting. Genitourinary: Negative for dysuria and urgency. Musculoskeletal: Negative for joint pain and myalgias. Skin: Negative for itching and rash. Neurological: Positive for dizziness (with bending down) and tingling (fingers). Negative for sensory change, focal weakness and headaches. Endo/Heme/Allergies: Positive for environmental allergies. Psychiatric/Behavioral: Negative for depression. The patient has insomnia ( Has not yet tried taking trazodone previously prescribed for this problem because she was told by her daughter it was not safe if she had a history of cardiac problems). The patient is not nervous/anxious. PHYSICAL EXAMINATION:    Constitutional: [x] Appears well-developed and well-nourished [x] No apparent distress        Mental status: [x] Alert and awake  [x] Oriented t [x] Able to follow commands      Eyes:   EOM    [x]  Normal        HENT: [x] Normocephalic,      Pulmonary/Chest: [x] Respiratory effort normal   [x] No visualized signs of difficulty breathing or respiratory distress           Musculoskeletal:   [x] No obvious deformities noted with regard to areas viewed           Neurological:        [x] No apparent neurologic deficits noted in areas viewed                 Skin:        [x] No apparent dermatologic abnormalities noted in areas viewed               Psychiatric:       [x] Normal Affect      Other pertinent observable physical exam findings:-None noted    Assessment & Plan:   Diagnoses and all orders for this visit:    1. Essential hypertension, benign  -     METABOLIC PANEL, BASIC; Future    2. Other hyperlipidemia  -     LIPID PANEL; Future  -     TSH 3RD GENERATION; Future    3. CKD (chronic kidney disease) stage 3, GFR 30-59 ml/min ([facility])  -     METABOLIC PANEL, BASIC; Future    4. Impaired glucose tolerance  -     HEMOGLOBIN A1C WITH EAG; Future    5.  History of heart valve replacement  -     CBC WITH AUTOMATED DIFF; Future    6. Insomnia, unspecified type    Advised that taking trazodone would not, in my opinion, represent a substantial risk because of her cardiac history  Schedule lab appointment with further disposition pending lab results of indicated  Continue current medications pending lab results  Avoid dietary salt, starch and sugar and do not take NSAIDs  Office follow-up in 6 months, sooner with any problems         I advised her to contact the office if her conditions worsen, change and with any new problems. She expressed understanding with the diagnosis(es) and plan. Completion of the problem-oriented portion of the telephone encounter required 12 minutes. Date of Service:  6/10/2020   Patient Name:  Betito Leyva   Patient :  1930     This is a Subsequent Medicare Annual Wellness Visit providing Personalized Prevention Plan Services (PPPS) (Performed 12 months after initial AWV and PPPS )     I have reviewed the patient's medical history in detail and updated the computerized patient record. History     Past Medical History:   Diagnosis Date    Congestive heart failure ([address]     Heart valve replaced     Hypercholesterolemia     Hypertension       Past Surgical History:   Procedure Laterality Date    CARDIAC SURG PROCEDURE UNLIST      heart valve    HX ORTHOPAEDIC      total knee    HX PACEMAKER       Current Outpatient Medications   Medication Sig Dispense Refill    esomeprazole (NEXIUM) 40 mg capsule take 1 capsule by mouth once daily 90 Cap 1    carvediloL (COREG) 12.5 mg tablet take 1 tablet by mouth twice a day with food 60 Tab 2    alendronate (FOSAMAX) 70 mg tablet take 1 tablet by mouth EVERY WEEK WITH 2000 UNIT VIT D 12 Tab 3    traZODone (DESYREL) 50 mg tablet take 1 tablet by mouth nightly 90 Tab 1    aspirin delayed-release 81 mg tablet Take  by mouth daily.  warfarin (COUMADIN) 6 mg tablet Take 6 mg by mouth.  warfarin (COUMADIN) 2 mg tablet 2 mg.  0    atorvastatin (LIPITOR) 20 mg tablet take 1 tablet by mouth once daily 90 Tab 3    anastrozole (ARIMIDEX) 1 mg tablet Take 1 mg by mouth daily.  rOPINIRole (REQUIP) 0.5 mg tablet take 1 tablet by mouth 4 times a day 360 Tab 5    lisinopril (PRINIVIL, ZESTRIL) 20 mg tablet Take 5 mg by mouth daily. 0    bumetanide (BUMEX) 2 mg tablet take 1 tablet by mouth once daily 90 Tab 1    isosorbide mononitrate ER (IMDUR) 30 mg tablet Take 30 mg by mouth daily.  cholecalciferol, VITAMIN D3, (VITAMIN D3) 5,000 unit tab tablet Take  by mouth daily.  multivitamins-minerals-lutein (CENTRUM SILVER) Tab Take  by mouth.  calcium carbonate (CALTREX) 600 mg (1,500 mg) tablet Take 600 mg by mouth two (2) times a day.          No Known Allergies  Family History   Problem Relation Age of Onset    Heart Disease Mother     Diabetes Father     Cancer Sister     Heart Attack Brother     Heart Attack Brother      Social History     Tobacco Use    Smoking status: Former Smoker    Smokeless tobacco: Never Used   Substance Use Topics    Alcohol use: No     Patient Active Problem List   Diagnosis Code    History of heart valve replacement Z95.2    Essential hypertension, benign I10    Other hyperlipidemia E78.49    Atrial fibrillation ([address] I48.91    Osteoarthritis of knee M17.10    Osteoporosis M81.0    History of total knee replacement Z96.659    Acute congestive heart failure (HCC) I50.9    Cardiomyopathy ([facility] [address] I42.9    Prediabetes R73.03    CKD (chronic kidney disease) stage 3, GFR 30-59 ml/min (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    Impaired glucose tolerance R73.02       Living situation:   -- Lives with family  -- Stairs in home no    Diet, Lifestyle: nonsmoker    Exercise level: moderately active    Depression Risk Factor Screening:     3 most recent PHQ Screens 3/2/2020   Little interest or pleasure in doing things Not at all   Feeling down, depressed, irritable, or hopeless Not at all   Total Score PHQ 2 0     Alcohol Risk Factor Screening:     Alcohol Risk Factor Screening:   Do you average 1 drink per night or more than 7 drinks a week:  No    On any one occasion in the past three months have you have had more than 3 drinks containing alcohol:  No    Functional Ability and Level of Safety:     Hearing Loss   Hearing is good. Activities of Daily Living   Self-care. Requires assistance with: no ADLs    Fall Risk     Fall Risk Assessment, last 12 mths 3/2/2020   Able to walk? Yes   Fall in past 12 months? Yes   Fall with injury? No   Number of falls in past 12 months 1   Fall Risk Score 1     Abuse Screen   Patient is not abused    Review of Systems   Review of systems reported in the separate problem-oriented documentation. Physical Examination   Physical exam reported in the separate problem-oriented documentation. VS: There were no vitals taken for this visit. Evaluation of Cognitive Function:  Mood/affect:  happy  Appearance: age appropriate  Family member/caregiver input: No    Patient Care Team:  Vandana Moeller MD as PCP - General (Family Practice)  Vandana Moeller MD as PCP - REHABILITATION [facility] Empaneled Provider    Advice/Counseling   Education and counseling provided:  Advanced directives counseling/discussion      Assessment/Plan       ICD-10-CM ICD-9-CM    1. Essential hypertension, benign E76 426.6 METABOLIC PANEL, BASIC   2. Other hyperlipidemia E78.49 272.4 LIPID PANEL      TSH 3RD GENERATION   3. CKD (chronic kidney disease) stage 3, GFR 30-59 ml/min ([facility]) H93.9 516.1 METABOLIC PANEL, BASIC   4. Impaired glucose tolerance R73.02 790.22 HEMOGLOBIN A1C WITH EAG   5. History of heart valve replacement Z95.2 V43.3 CBC WITH AUTOMATED DIFF   6. Insomnia, unspecified type G47.00 780.52    .     Health Maintenance:            5-year personal health care plan:  Complete and return advance directives form  Bone density screening not current  Shingrix immunization available at the pharmacy to decrease the risk of shingles discussed  Glaucoma screening recommended every 2 years  Influenza immunization due fall 2020  Medicare wellness evaluation and follow-up June 2021        Marshall Eckert MD                    This service was provided through synchronous telephone communication the patient is at home and the provider is at [address] [facility] and Swetha Gomez LPN assisted with the telehealth visit. Pursuant to the emergency declaration under the [facility], [facility] waiver authority and the [employer] Act, this Virtual  Visit was conducted, with patient's consent, to reduce the patient's risk of exposure to COVID-19 and provide continuity of care for an established patient. Services were provided through a video synchronous discussion virtually to substitute for in-person clinic visit. Marielena Nice MD         PLEASE NOTE:   This document has been produced using voice recognition software. Unrecognized errors in transcription may be present.

## 2020-06-10 NOTE — PROGRESS NOTES
Keegan Ramos is a 80 y.o. female (: 1930) presenting to address:    Chief Complaint   Patient presents with   Mechelle Batista Annual Wellness Visit       There were no vitals filed for this visit. Hearing/Vision:   No exam data present    Learning Assessment:     Learning Assessment 3/6/2015   PRIMARY LEARNER Patient   HIGHEST LEVEL OF EDUCATION - PRIMARY LEARNER  DID NOT GRADUATE HIGH SCHOOL   BARRIERS PRIMARY LEARNER NONE   CO-LEARNER CAREGIVER No   PRIMARY LANGUAGE ENGLISH    NEED No   LEARNER PREFERENCE PRIMARY DEMONSTRATION   ANSWERED BY Patient   RELATIONSHIP SELF     Depression Screening:     3 most recent PHQ Screens 3/2/2020   Little interest or pleasure in doing things Not at all   Feeling down, depressed, irritable, or hopeless Not at all   Total Score PHQ 2 0     Fall Risk Assessment:     Fall Risk Assessment, last 12 mths 3/2/2020   Able to walk? Yes   Fall in past 12 months? Yes   Fall with injury? No   Number of falls in past 12 months 1   Fall Risk Score 1     Abuse Screening:     Abuse Screening Questionnaire 2019   Do you ever feel afraid of your partner? N   Are you in a relationship with someone who physically or mentally threatens you? N   Is it safe for you to go home? Y     Coordination of Care Questionaire:   1. Have you been to the ER, urgent care clinic since your last visit? Hospitalized since your last visit? NO    2. Have you seen or consulted any other health care providers outside of the [address] [place] [address] since your last visit? Include any pap smears or colon screening. NO    Advanced Directive:   1. Do you have an Advanced Directive? NO    2. Would you like information on Advanced Directives?  YES

## 2020-06-10 NOTE — PATIENT INSTRUCTIONS
5-year personal health care plan: 
Complete and return advance directives form Bone density screening not current Shingrix immunization available at the pharmacy to decrease the risk of shingles discussed Glaucoma screening recommended every 2 years Influenza immunization due fall 2020 Medicare wellness evaluation and follow-up June 2021 Advised that taking trazodone would not, in my opinion, represent a substantial risk because of her cardiac history Schedule lab appointment with further disposition pending lab results of indicated Continue current medications pending lab results Avoid dietary salt, starch and sugar and do not take NSAIDs Office follow-up in 6 months, sooner with any problems

## 2020-06-24 LAB
BASOPHILS # BLD AUTO: 0 X10E3/UL (ref 0–0.2)
BASOPHILS NFR BLD AUTO: 1 %
BUN SERPL-MCNC: 33 MG/DL (ref 8–27)
BUN/CREAT SERPL: 28 (ref 12–28)
CALCIUM SERPL-MCNC: 9.7 MG/DL (ref 8.7–10.3)
CHLORIDE SERPL-SCNC: 99 MMOL/L (ref 96–106)
CHOLEST SERPL-MCNC: 150 MG/DL (ref 100–199)
CO2 SERPL-SCNC: 27 MMOL/L (ref 20–29)
CREAT SERPL-MCNC: 1.17 MG/DL (ref 0.57–1)
EOSINOPHIL # BLD AUTO: 0.1 X10E3/UL (ref 0–0.4)
EOSINOPHIL NFR BLD AUTO: 2 %
ERYTHROCYTE [DISTWIDTH] IN BLOOD BY AUTOMATED COUNT: 13.3 % (ref 11.7–15.4)
EST. AVERAGE GLUCOSE BLD GHB EST-MCNC: 126 MG/DL
GLUCOSE SERPL-MCNC: 149 MG/DL (ref 65–99)
HBA1C MFR BLD: 6 % (ref 4.8–5.6)
HCT VFR BLD AUTO: 34.4 % (ref 34–46.6)
HDLC SERPL-MCNC: 72 MG/DL
HGB BLD-MCNC: 11.2 G/DL (ref 11.1–15.9)
IMM GRANULOCYTES # BLD AUTO: 0 X10E3/UL (ref 0–0.1)
IMM GRANULOCYTES NFR BLD AUTO: 0 %
INTERPRETATION, 910389: NORMAL
INTERPRETATION: NORMAL
LDLC SERPL CALC-MCNC: 69 MG/DL (ref 0–99)
LYMPHOCYTES # BLD AUTO: 0.4 X10E3/UL (ref 0.7–3.1)
LYMPHOCYTES NFR BLD AUTO: 7 %
MCH RBC QN AUTO: 32.7 PG (ref 26.6–33)
MCHC RBC AUTO-ENTMCNC: 32.6 G/DL (ref 31.5–35.7)
MCV RBC AUTO: 100 FL (ref 79–97)
MONOCYTES # BLD AUTO: 0.6 X10E3/UL (ref 0.1–0.9)
MONOCYTES NFR BLD AUTO: 11 %
NEUTROPHILS # BLD AUTO: 4.6 X10E3/UL (ref 1.4–7)
NEUTROPHILS NFR BLD AUTO: 79 %
NT-PROBNP SERPL-MCNC: ABNORMAL PG/ML (ref 0–738)
PDF IMAGE, 910387: NORMAL
PLATELET # BLD AUTO: 167 X10E3/UL (ref 150–450)
POTASSIUM SERPL-SCNC: 4 MMOL/L (ref 3.5–5.2)
RBC # BLD AUTO: 3.43 X10E6/UL (ref 3.77–5.28)
SODIUM SERPL-SCNC: 140 MMOL/L (ref 134–144)
TRIGL SERPL-MCNC: 45 MG/DL (ref 0–149)
TSH SERPL DL<=0.005 MIU/L-ACNC: 2.61 UIU/ML (ref 0.45–4.5)
VLDLC SERPL CALC-MCNC: 9 MG/DL (ref 5–40)
WBC # BLD AUTO: 5.8 X10E3/UL (ref 3.4–10.8)

## 2020-06-24 NOTE — PROGRESS NOTES
Please advise that lab results show her complete blood count is stable, kidney function is also stable with her potassium in the normal range. Her hemoglobin A1c indicates that her glucose levels are in a safe range. Her screening test for any thyroid abnormalities is also normal.  The NT proBNP which is a measure of heart function has always been quite elevated and is especially elevated when she has heart failure symptoms. This had shown improvement back in March and is still better than it has been in the past but has risen some. It might be a good idea for her to follow-up with her cardiologist.  As best I can tell from the record she has seen a Dr. Malgorzata Langley and it would probably be helpful for her to schedule an appointment to follow-up there. We can help with a referral if that is needed.

## 2020-06-25 RX ORDER — ALENDRONATE SODIUM 70 MG/1
TABLET ORAL
Qty: 12 TAB | Refills: 3 | OUTPATIENT
Start: 2020-06-25

## 2020-07-07 ENCOUNTER — TELEPHONE (OUTPATIENT)
Dept: FAMILY MEDICINE CLINIC | Age: 85
End: 2020-07-07

## 2020-07-07 DIAGNOSIS — I10 ESSENTIAL HYPERTENSION, BENIGN: ICD-10-CM

## 2020-07-07 DIAGNOSIS — I50.22 CHRONIC SYSTOLIC CONGESTIVE HEART FAILURE (HCC): ICD-10-CM

## 2020-07-07 RX ORDER — CARVEDILOL 12.5 MG/1
TABLET ORAL
Qty: 60 TAB | Refills: 5 | Status: SHIPPED | OUTPATIENT
Start: 2020-07-07

## 2020-07-07 NOTE — TELEPHONE ENCOUNTER
Last visit: 6/10/2020  Next visit: not scheduled  Last filled: 4/8/2020; coreg 12.5 mg tab; qty 60 w/2 refills

## 2020-07-07 NOTE — TELEPHONE ENCOUNTER
Patient called stating that she is almost out of medication and needs a refill, please advise.      Requested Prescriptions     Pending Prescriptions Disp Refills    carvediloL (COREG) 12.5 mg tablet 60 Tab 2

## 2020-07-12 RX ORDER — ESOMEPRAZOLE MAGNESIUM 40 MG/1
CAPSULE, DELAYED RELEASE ORAL
Qty: 90 CAP | Refills: 1 | Status: SHIPPED | OUTPATIENT
Start: 2020-07-12

## 2020-07-20 ENCOUNTER — OFFICE VISIT (OUTPATIENT)
Dept: FAMILY MEDICINE CLINIC | Age: 85
End: 2020-07-20

## 2020-07-20 VITALS
RESPIRATION RATE: 15 BRPM | DIASTOLIC BLOOD PRESSURE: 60 MMHG | WEIGHT: 93.2 LBS | HEIGHT: 58 IN | BODY MASS INDEX: 19.56 KG/M2 | SYSTOLIC BLOOD PRESSURE: 120 MMHG | TEMPERATURE: 98.3 F | OXYGEN SATURATION: 95 % | HEART RATE: 68 BPM

## 2020-07-20 DIAGNOSIS — I48.0 PAROXYSMAL ATRIAL FIBRILLATION (HCC): Primary | ICD-10-CM

## 2020-07-20 DIAGNOSIS — R73.02 IMPAIRED GLUCOSE TOLERANCE: ICD-10-CM

## 2020-07-20 DIAGNOSIS — N18.30 CKD (CHRONIC KIDNEY DISEASE) STAGE 3, GFR 30-59 ML/MIN (HCC): ICD-10-CM

## 2020-07-20 DIAGNOSIS — Z09 HOSPITAL DISCHARGE FOLLOW-UP: ICD-10-CM

## 2020-07-20 DIAGNOSIS — Z95.2 HISTORY OF HEART VALVE REPLACEMENT: ICD-10-CM

## 2020-07-20 DIAGNOSIS — Z87.01 HISTORY OF PNEUMONIA: ICD-10-CM

## 2020-07-20 DIAGNOSIS — I10 ESSENTIAL HYPERTENSION, BENIGN: ICD-10-CM

## 2020-07-20 DIAGNOSIS — I42.9 CARDIOMYOPATHY, UNSPECIFIED TYPE (HCC): ICD-10-CM

## 2020-07-20 RX ORDER — SACUBITRIL AND VALSARTAN 24; 26 MG/1; MG/1
1 TABLET, FILM COATED ORAL 2 TIMES DAILY
COMMUNITY
End: 2020-12-31 | Stop reason: DRUGHIGH

## 2020-07-20 NOTE — PROGRESS NOTES
Lillian Giron is a 80 y.o. female (: 1930) presenting to address:    No chief complaint on file. Vitals:    20 1258   BP: 120/60   Pulse: 68   Resp: 15   Temp: 98.3 °F (36.8 °C)   TempSrc: Temporal   SpO2: 95%   Weight: 93 lb 3.2 oz (42.3 kg)   Height: 4' 10\" (1.473 m)   PainSc:   0 - No pain       Hearing/Vision:   No exam data present    Learning Assessment:     Learning Assessment 6/10/2020   PRIMARY LEARNER Patient   HIGHEST LEVEL OF EDUCATION - PRIMARY LEARNER  DID NOT GRADUATE HIGH SCHOOL   BARRIERS PRIMARY LEARNER NONE   CO-LEARNER CAREGIVER No   PRIMARY LANGUAGE ENGLISH    NEED No   LEARNER PREFERENCE PRIMARY VIDEOS   ANSWERED BY patient   RELATIONSHIP SELF     Depression Screening:     3 most recent PHQ Screens 2020   Little interest or pleasure in doing things Not at all   Feeling down, depressed, irritable, or hopeless Not at all   Total Score PHQ 2 0     Fall Risk Assessment:     Fall Risk Assessment, last 12 mths 2020   Able to walk? Yes   Fall in past 12 months? No   Fall with injury? -   Number of falls in past 12 months -   Fall Risk Score -     Abuse Screening:     Abuse Screening Questionnaire 6/10/2020   Do you ever feel afraid of your partner? N   Are you in a relationship with someone who physically or mentally threatens you? N   Is it safe for you to go home? Y     Coordination of Care Questionaire:   1. Have you been to the ER, urgent care clinic since your last visit? Hospitalized since your last visit? YES, [address] for pneumonia    2. Have you seen or consulted any other health care providers outside of the [address] [place] [address] since your last visit? Include any pap smears or colon screening. NO    Advanced Directive:   1. Do you have an Advanced Directive? NO    2. Would you like information on Advanced Directives?  NO

## 2020-07-20 NOTE — PATIENT INSTRUCTIONS
Lab today, further disposition pending lab results of indicated  Continue current medications  Cardiology follow-up as scheduled  Follow-up in 3 months, sooner with any problems

## 2020-07-20 NOTE — PROGRESS NOTES
HISTORY OF PRESENT ILLNESS  Abbie Dougherty is a 80 y.o. female. She presents for follow-up of her recent hospitalization at [facility].    Discharge summary:    Admit Date: 7/9/2020  Discharge Date: 7/12/2020    Patient ID:  Chay Hogan  80 y.o.  12/25/1930    Chief Complaint   Patient presents with   Caity Muckle OF BREATH     Discharge Diagnosis:     Acute bacterial versus viral pneumonia with transient acute hypoxic> hypercapnic respiratory failure : JOCELYN-CoV-2 PCR negative, urine antigens pending  Mild hypokalemia on Bumex: Repleted recommend follow-up recheck clarify if potassium supplements indicated. Severe unspecified cardiomyopathy EF 29% with chronic systolic CHF : Compensated without acute heart failure on medical therapy  Essential hypertension: Lability BP and elevated blood pressures in hospital, reevaluate in office  S/P Mechanical AVR 2009 with moderate aortic stenosis : On chronic Coumadin   S/P Bioprosthetic MVR 2009   H/O Paroxysmal atrial fibrillation 2009: Paced rhythm on chronic Coumadin  Permanent biventricular pacemaker   Hyperlipidemia on statin therapy  Restless leg syndrome on Requip  Chronic macrocytosis without anemia: B12 > 1000  H/O Bleeding Gastric ulcer 2007, upper GI bleed May 2019 patient counseled to discuss stopping aspirin with her primary care team   H/O Left mastectomy for breast cancer August 2019 on Arimidex. No evidence of recurrent disease  FULL CODE with limits: Patient states she is a full code but would not want to be supported in a permanent coma or vegetative state. Tests pending at discharge: Legionella and streptococcal urinary antigens    Current Discharge Medication List     START taking these medications   Details   amoxicillin-clavulanate (AUGMENTIN) 500-125 mg PO TABS Take 1 Tab by Mouth Every 12 hours for 4 days. Take with food or milk.   Qty: 8 Tab, Refills: 0       CONTINUE these medications which have NOT CHANGED   Details anastrozole (ARIMIDEX) 1 mg PO TABS Take 1 mg by Mouth Once a Day. aspirin 81 mg PO CHEW Take 81 mg by Mouth Once a Day. atorvastatin (LIPITOR) 20 mg PO TABS Take 20 mg by Mouth Once a Day. bumetanide (BUMEX) 2 mg PO TABS Take 1 Tab by Mouth Once a Day. Qty: 14 Tab, Refills: 0     CALTRATE 600+D PLUS MINERALS 600-400 mg (1,500 mg) PO CHEW Take 1 Tab by Mouth Twice Daily. carvediloL (COREG) 12.5 mg PO TABS Take 12.5 mg by Mouth 2 Times Daily with Meals. CENTRUM SILVER PO Take 1 Tab by Mouth Once a Day. ESomeprazole (NEXIUM) 40 mg PO CPDR Take 40 mg by Mouth Once a Day. isosorbide mononitrate CR (IMDUR) 30 mg PO TB24 Take 1 Tab by Mouth Once a Day. Qty: 30 Tab, Refills: 1     lisinopril (PRINIVIL) 5 mg PO TABS Take 5 mg by Mouth Once a Day. rOPINIRole (REQUIP) 0.5 mg PO TABS Take 0.5 mg by Mouth Twice Daily. traZODone (DESYREL) 50 mg PO TABS Take 50 mg by Mouth nightly as needed. warfarin (COUMADIN) 6 mg PO TABS Take 6-9 mg by Mouth Once a Day. Take 1 x 6mg tablet (6mg) by mouth in the evening on Saturday, Sunday, Tuesday, Thursday and take 1.5 x 6mg tablet (9mg) all other days (Monday, Wednesday, Friday). Followed by [facility]. STOP taking these medications     zolpidem (AMBIEN) 5 mg PO TABS Comments:   Reason for Stopping:         Operative Procedures: None    Consultants: None    HPI  80 y.o. female with HTN, severe cardiomyopathy 29% with biventricular pacemaker, mechanical AVR, bioprosthetic MVR on chronic Coumadin therapy, breast cancer on a REM index being admitted to the hospital with acute dyspnea with cough blood-tinged sputum diminished oral intake with acute hypoxic > hypercapnic respiratory failure leukocytosis and elevated BNP. Awoke with new MEDEROS walking today, preceded by off an on mild cough and minimal wheezes for 2-3 days, productive of red blood tinged sputum. No fever, rigors, night sweats, CP, leg swelling, orthopnea or PND.  No Headache, sore throat, no myalgias / or new arhtralgias or new fatigue. No change sense of smell. Lives with  who is not ill and they social isolate and have no known direct COVID 19 contacts or travel risk. Patient presented to the emergency room, where room air sat was 84% and she was placed on 3 L, she was afebrile, respiratory rate in the 20-25 range without distress blood pressure 144/63 with a pulse of 108, chest x-ray suggested cardiomegaly with some mild interstitial edema some atelectasis and possible small pleural effusion of right lung base. WBC 12,800, INR 2.99, lactate is normal. Calcium 3.4, BNP 7885, troponin 25 , ABG on 3 L 7.36, PO2 106, PCO2 of 54. EKG had paced rhythm.      Emergency room ordered blood cultures and urine cultures, placed the patient on ceftriaxone and Zithromax, requested Covid-19 testing. She states she feels better on oxygen. She is admitted to hospital for further evaluation and treatment is placed in contact and droplet precautions pending COVID testing.       Hospital Course:     Patient was admitted to the hospital with shortness of breath productive cough leukocytosis and suspected right lower lobe pneumonia with acute hypoxic respiratory failure saturation 84% on room air. She was placed on oxygen, ceftriaxone and doxycycline and continued on her chronic cardiac medicines occluding chronic Coumadin. She improved was weaned off oxygen blood cultures had no growth and she remained afebrile with normalization of leukocytosis. JOCELYN-CoV-2 PCR negative x2. Urine streptococcal and Legionella antigens pending at discharge. She was diagnosed with bacterial versus viral pneumonia, felt not to have Covid-19 illness at this time, sepsis is not evident, pulmonary embolism unlikely on chronic anticoagulation. . Less likely acute congestive heart failure or aspiration event, although she is not having any PND orthopnea or leg swelling and creatinine briefly jonas to 1.3 admit hospitalization normalized by discharge. Mild hypokalemia was treated. Asymptomatic pyuria noted on urinalysis: Urine culture is contaminated. Urinary infection is not suspected. She is ambulating in room off oxygen, tolerating diet. Oxygen saturation is 9192% on discharge on room air. She has to stay another day in the hospital to ensure that she was stable did well over the ensuing 24 hours and is being transitioned from IV antibiotic therapy to Augmentin for 7-day course of treatment total duration antibiotic therapy. She is discharged home where she lives with her  and has a supportive son to follow-up with her primary care physician and cardiologist after discharge. She is counseled to take her next dose of Coumadin tomorrow and discuss with her cardiologist whether to discontinue aspirin on chronic Coumadin therapy at age 80 with history of previous upper GI bleeding. She is counseled that INR is need to be monitored frequently over the next 2 weeks after treatment of pneumonia with antibiotic therapy. She does not smoke. Physical Exam on Discharge:  /81  Pulse 75  Temp 98.6 °F (37 °C)  Resp 18  Ht 59\" (1.499 m)  Wt 43.6 kg (96 lb 1.6 oz)  SpO2 93%  BMI 19.41 kg/m²   General Appearance: Well-appearing female sitting up in her bed had no cough on my visit., Lungs: Few squeaks and rales at left base more than right no egophony or dullness. There is no wheezing, Heart: Regular rate and rhythm, No gallop, No jugular vein distension, Grade II systolic ejection murmur, Abdomen: Soft , Non-distended and Non-tender, Extremities: No edema of legs, Lower extremities are warm and appear well perfused, Neuro: alert, oriented, affect appropriate, speech fluent and no focal neurological deficits     Findings: Assessment of left lower lung zone is limited by soft tissue artifact, superimposed left heart.  There are increased interstitial markings, which may relate to chronic interstitial thickening/senescent change versus interstitial edema. There is mild blunting of the right costophrenic sulcus and hazy opacity at the right lower lung zone. The pericardial cardiac silhouette is enlarged. Left-sided intracardiac biventricular pacemaker device is in place. There has been previous aortic valve replacement.        Impression       Findings suggest pleural fluid and/or atelectasis, at least at the right lung base. Mild interstitial edema versus chronic interstitial thickening. Cardiomegaly.         EKG: AV paced official report pending. I have review study myself    Condition at discharge: Afebrile, Ambulating off oxygen, Eating, Drinking, Voiding, Improved and Stable    Disposition: Home    Discharge Procedure Orders   Diet as Follows: No added salt diet, low-cholesterol diet     No Activity Restrictions     Follow Up with PCP within 7 days   Follow up with Carol Cabrera MD within 7 days of hospitalization for pneumonia and low potassium level. Call for appointment to monitor recovery, recheck potassium and make sure Coumadin is being properly adjusted. Additional Instructions as Follows: Take next dose of Coumadin on Monday, July 13. Coumadin was given in the hospital on Sunday. Need another Coumadin INR check within the next 3 to 5 days after discharge from hospital. Pneumonia and medications for pneumonia can change Coumadin dosing over the next 2 weeks. Have cardiologist check Coumadin at next office visit     Additional Instructions as Follows:   Cough shortness of breath should steadily improve over the next week with treatment of pneumonia. If recurring fever greater than 100.5 degrees or worsening shortness of breath or worsening sputum recommend reevaluation with office physician or emergency room. Follow Up with Specialist   Follow up with cardiology appointment as scheduled next week at which time repeat Coumadin INR test can be done.  Coumadin may need extra adjustment in the next week after discharge from the hospital on antibiotic therapy for pneumonia. Review of Systems   Constitutional: Positive for weight loss ( 4 pound weight loss compared with 3/2020). Negative for fever. Respiratory: Negative for cough, sputum production, shortness of breath and wheezing. Cardiovascular: Negative for chest pain, palpitations, orthopnea, leg swelling and PND. Gastrointestinal: Negative for abdominal pain, diarrhea, nausea and vomiting. Genitourinary: Negative for dysuria, frequency and urgency. Neurological: Negative for dizziness, tingling and headaches. Endo/Heme/Allergies: Willstart Prolia next [place] per rheumatology     Visit Vitals  /60 (BP 1 Location: Right arm, BP Patient Position: Sitting)   Pulse 68   Temp 98.3 °F (36.8 °C) (Temporal)   Resp 15   Ht 4' 10\" (1.473 m)   Wt 93 lb 3.2 oz (42.3 kg)   SpO2 95%   BMI 19.48 kg/m²       Physical Exam  Constitutional:       Appearance: Normal appearance. HENT:      Head: Normocephalic. Eyes:      Extraocular Movements: Extraocular movements intact. Cardiovascular:      Rate and Rhythm: Normal rate and regular rhythm. Pulmonary:      Effort: Pulmonary effort is normal.      Breath sounds: Rales ( Left base) present. Musculoskeletal:      Right lower leg: No edema. Left lower leg: No edema. Neurological:      General: No focal deficit present. Mental Status: She is alert and oriented to person, place, and time. Psychiatric:         Mood and Affect: Mood normal.         Behavior: Behavior normal.         ASSESSMENT and PLAN    ICD-10-CM ICD-9-CM    1. Hospital discharge follow-up  Z09 V67.59    2. History of pneumonia  Z87.01 V12.61    3. Paroxysmal atrial fibrillation (HCC)  I48.0 427.31    4. History of heart valve replacement  Z95.2 V43.3    5. Essential hypertension, benign  H97 864.6 METABOLIC PANEL, BASIC   6. Cardiomyopathy, unspecified type ([facility] [address]  I42.9 425.4    7.  CKD (chronic kidney disease) stage 3, GFR 30-59 ml/min ([facility])  Z17.9 152.5 METABOLIC PANEL, BASIC   8.  Impaired glucose tolerance  A79.56 780.19 METABOLIC PANEL, BASIC   Lab today, further disposition pending lab results of indicated  Continue current medications  Cardiology follow-up as scheduled  Follow-up in 3 months, sooner with any problems

## 2020-07-21 LAB
BUN SERPL-MCNC: 34 MG/DL (ref 8–27)
BUN/CREAT SERPL: 26 (ref 12–28)
CALCIUM SERPL-MCNC: 10.3 MG/DL (ref 8.7–10.3)
CHLORIDE SERPL-SCNC: 101 MMOL/L (ref 96–106)
CO2 SERPL-SCNC: 22 MMOL/L (ref 20–29)
CREAT SERPL-MCNC: 1.29 MG/DL (ref 0.57–1)
GLUCOSE SERPL-MCNC: 142 MG/DL (ref 65–99)
INTERPRETATION: NORMAL
POTASSIUM SERPL-SCNC: 4.3 MMOL/L (ref 3.5–5.2)
SODIUM SERPL-SCNC: 142 MMOL/L (ref 134–144)

## 2020-08-31 ENCOUNTER — PATIENT OUTREACH (OUTPATIENT)
Dept: CASE MANAGEMENT | Age: 85
End: 2020-08-31

## 2020-08-31 NOTE — PROGRESS NOTES
Patient was admitted to [facility] on 20 and discharged on 20 for Resp failure/CHF/CAP/AVR/MVR. Patient was contacted within 1 business day of discharge. Top Discharge Challenges to be reviewed by the provider   Additional needs identified to be addressed with provider no  home health care- [facility]    Method of communication with provider : none       Advance Care Planning:   Does patient have an Advance Directive:  not on file     Inpatient Readmission Risk score: not available at this time. Was this a readmission? yes   Patient stated reason for the admission: SOB  Patients top risk factors for readmission: medical condition  Interventions to address risk factors: closely follow up with PCP, Pulmonology and Surgery    Care Transition Nurse (CTN) contacted the patient by telephone to perform post hospital discharge assessment. Verified name and  with patient as identifiers. Provided introduction to self, and explanation of the CTN role. Patient reported that she is doing well. Pt. lives with  and two sons. Pt.  sons or daughter will drive Pt. to her appt. Pt. Reported that she has all her medications but declined to review her medications  at this time. No questions, concerns and/or needs at this time as per Pt. CDC guidelines, and Red flags on when to go back to ED (Chest pain, difficulty breathing, shortness of breath, high fevers and/or worsening of symptoms) were reviewed and discuss with Pt. Pt. Verbalized and repeated back understanding    CTN reviewed discharge instructions, medical action plan and red flags with patient who verbalized understanding. Patient given an opportunity to ask questions and does not have any further questions or concerns at this time. The patient agrees to contact the PCP office for questions related to their healthcare. Medication reconciliation was not performed with patient, Pt. Declined.      Referral to Pharm D needed: no     Home Health/Outpatient orders at discharge: home health care  [address] [place] [address] Way: [facility]   Date of initial visit: 8/31/20     Durable Medical Equipment ordered at discharge: none as per Pt. Covid Risk Education    Patient has following risk factors of: heart failure and age. Education provided regarding infection prevention, and signs and symptoms of COVID-19 and when to seek medical attention with patient who verbalized understanding. Discussed exposure protocols and quarantine From CDC: Are you at higher risk for severe illness?  and given an opportunity for questions and concerns. The patient agrees to contact the COVID-19 hotline [phone] or PCP office for questions related to COVID-19. For more information on steps you can take to protect yourself, see CDC's How to Protect Yourself     Patient/family/caregiver given information for [employer] and agrees to enroll no    Discussed follow-up appointments. If no appointment was previously scheduled, appointment scheduling offered: no already have appt with PCP. [facility] follow up appointment(s):   Future Appointments   Date Time Provider Sachin Cam   9/10/2020 11:00 AM Jemima Villar MD [facility]   10/12/2020  1:00 PM Jemima Villar MD [facility]     [facility] follow up appointment(s): taken from [facility] MD office epic. Tuesday Sep 01, 2020    Office Visit with Marilyn Meza MD at 10:00 AM    Where: [facility] )           Monday Oct 05, 2020    Follow Up with Rahul Gonzalez MD at 3:40 PM    Where: [address] [facility] )      [address] [facility]   [place]   [place] [address] [address] [facility]   [phone]     Plan for follow-up call in 10-14 days based on severity of symptoms and risk factors. CTN provided contact information for future needs.     Goals Addressed                 This Visit's Progress     Prevent complications post hospitalization. Pt. Will attend appt with PCP. Pt. Will take all medications as prescribed. Pt. Will call CTN or PCP office for any questions, concerns and/or needs.  Understands red flags post discharge. Patient will go to the nearest emergency room for chest pain, shortness of breath, returning of symptoms that brought her to the emergency room and/or worsening of symptoms. Patient will contact PCP office for any questions or concerns related to healthcare.

## 2020-09-10 ENCOUNTER — OFFICE VISIT (OUTPATIENT)
Dept: FAMILY MEDICINE CLINIC | Age: 85
End: 2020-09-10

## 2020-09-10 VITALS
OXYGEN SATURATION: 96 % | HEART RATE: 61 BPM | WEIGHT: 95.6 LBS | RESPIRATION RATE: 13 BRPM | TEMPERATURE: 98.6 F | DIASTOLIC BLOOD PRESSURE: 72 MMHG | SYSTOLIC BLOOD PRESSURE: 130 MMHG | BODY MASS INDEX: 20.07 KG/M2 | HEIGHT: 58 IN

## 2020-09-10 DIAGNOSIS — I50.22 CHRONIC SYSTOLIC CONGESTIVE HEART FAILURE (HCC): ICD-10-CM

## 2020-09-10 DIAGNOSIS — Z23 NEEDS FLU SHOT: ICD-10-CM

## 2020-09-10 DIAGNOSIS — I42.9 CARDIOMYOPATHY, UNSPECIFIED TYPE (HCC): ICD-10-CM

## 2020-09-10 DIAGNOSIS — I48.0 PAROXYSMAL ATRIAL FIBRILLATION (HCC): ICD-10-CM

## 2020-09-10 DIAGNOSIS — Z09 HOSPITAL DISCHARGE FOLLOW-UP: Primary | ICD-10-CM

## 2020-09-10 DIAGNOSIS — G25.81 RESTLESS LEGS SYNDROME: ICD-10-CM

## 2020-09-10 DIAGNOSIS — N18.30 CKD (CHRONIC KIDNEY DISEASE) STAGE 3, GFR 30-59 ML/MIN (HCC): ICD-10-CM

## 2020-09-10 DIAGNOSIS — I10 ESSENTIAL HYPERTENSION, BENIGN: ICD-10-CM

## 2020-09-10 RX ORDER — ROPINIROLE 0.5 MG/1
TABLET, FILM COATED ORAL
Qty: 90 TAB | Refills: 2 | Status: SHIPPED | OUTPATIENT
Start: 2020-09-10 | End: 2020-10-30 | Stop reason: DRUGHIGH

## 2020-09-10 RX ORDER — BUMETANIDE 1 MG/1
TABLET ORAL DAILY
COMMUNITY
End: 2020-12-31 | Stop reason: DRUGHIGH

## 2020-09-10 NOTE — PATIENT INSTRUCTIONS
Increase ropinirole to 0.5 mg, 3 tablets daily at bedtime Continue with current medications pending subspecialty follow-up Schedule Medicare wellness evaluation in June, follow-up sooner with any problems

## 2020-09-10 NOTE — PROGRESS NOTES
Jo Ann Marte is a 80 y.o. female (: 1930) presenting to address:    Chief Complaint   Patient presents with   [facility] Follow Up       Vitals:    09/10/20 1101   BP: 130/72   Pulse: 61   Resp: 13   Temp: 98.6 °F (37 °C)   TempSrc: Temporal   SpO2: 96%   Weight: 95 lb 9.6 oz (43.4 kg)   Height: 4' 10\" (1.473 m)   PainSc:   0 - No pain       Hearing/Vision:   No exam data present    Learning Assessment:     Learning Assessment 6/10/2020   PRIMARY LEARNER Patient   HIGHEST LEVEL OF EDUCATION - PRIMARY LEARNER  DID NOT GRADUATE HIGH SCHOOL   BARRIERS PRIMARY LEARNER NONE   CO-LEARNER CAREGIVER No   PRIMARY LANGUAGE ENGLISH    NEED No   LEARNER PREFERENCE PRIMARY VIDEOS   ANSWERED BY patient   RELATIONSHIP SELF     Depression Screening:     3 most recent PHQ Screens 9/10/2020   Little interest or pleasure in doing things Not at all   Feeling down, depressed, irritable, or hopeless Not at all   Total Score PHQ 2 0     Fall Risk Assessment:     Fall Risk Assessment, last 12 mths 9/10/2020   Able to walk? Yes   Fall in past 12 months? No   Fall with injury? -   Number of falls in past 12 months -   Fall Risk Score -     Abuse Screening:     Abuse Screening Questionnaire 6/10/2020   Do you ever feel afraid of your partner? N   Are you in a relationship with someone who physically or mentally threatens you? N   Is it safe for you to go home? Y     Coordination of Care Questionaire:   1. Have you been to the ER, urgent care clinic since your last visit? Hospitalized since your last visit? YES, [facility]    2. Have you seen or consulted any other health care providers outside of the [address] [place] [address] since your last visit? Include any pap smears or colon screening. YES; oncology (breast cancer specialist)    Advanced Directive:   1. Do you have an Advanced Directive? NO    2. Would you like information on Advanced Directives?  NO      Immunization of the Flu vaccine was administered 9/10/2020 by Cas Cespedes LPN. Patient tolerated procedure well. No reactions noted.

## 2020-09-10 NOTE — PROGRESS NOTES
HISTORY OF PRESENT ILLNESS  Antonio Lay is a 80 y.o. female. She presents for follow-up of recent hospital admission. Discharge Summary - [facility]     Thomas Marte is a 80 y.o. female. : 1930   MRN: [id number]    Attending Physician: Yousif Genao MD  PCP: Carol Cabrera MD    Transition of Care   Admit Date: 2020 D/C Date: 2020 Patient Class: Inpatient     Primary Discharge Diagnosis:   Resp failure/CHF/CAP/AVR/MVR    Hospital Course   Chief Complaint/History of Present Illness:   Chief Complaint: SOB     81 y/o F with hx of CHF CMO MVR Chronic anemia who was recently at [facility] for lower GI bleed and received transfusions now brought to ED for SOB. Limited historian and on BiPAP in ED. Per report she was feeling SOB and coughing pink mucus on waking early this morning. EMS found Sats in 70s and needed 12 L O2 to get above 90s. She was brought to ED where found hypoxic and was started on BiPAP and dosed with IV lasix. She feels somewhat better now. She denies any chest pain abdominal pain fever chills nausea vomiting or diarrhea. Admission is needed for CHF exacerbation and respiratory failure. Problems Managed During Hospitalization:  1) Acute Hypoxic Respiratory Failure - Multifactorial from CHF exacerbation Pulm Edema and CAP RLL  Sats 78% on RA per EMS. Given 12 L NC in ED and then BiPAP for few hours. Now on 2 L NC. ABG with PO2 65 --> 91 while was on BIPAP   No Prior home O2 per patient. ProbNP 7K -> 12 K  COVID 19- Neg. low suspicion. Desiolated   Temp 99 - 100 F. Afebrile now  Mild hemoptysis now resolved     CXR on admit - Increasing nonspecific right lower lobe airspace disease. Stable opacities in the left lung base. Leading differential considerations would include atypical infectious process versus pulmonary edema.     CT chest  - Corresponding to the recent chest x-ray finding is a moderate sized region of atelectasis at right lower lobe. Small right pleural effusion. Trace left pleural effusion with adjacent groundglass opacity which may reflect infiltrate versus atelectasis. Stable cardiomegaly and postoperative changes.     S/P Doxy/Rocephinn. Keflex/doxy on dc to complete course  Resp CX - Normal gaye only     Appreciate Pulmonary for Input  CXR today -Stable ill-defined opacity in the right base in keeping with aspiration/pneumonia. S/P IV lasix 40 mg BID  Bumex 1 mg daily on dc   Strict I/0s. Recent 3 PRBCs on 8/9 8/11 for Lower GI bleed at [facility]     2) Acute on chronic combined CHF   Prior ECHO 12/17/19 with EF 29%. Repeat ECHO with EF 30%  S/P IV diuresis. PO Bumex started as above. Trop 27 34. On Coreg Entresto imdur ASA   Appreciate Cardiology consult     3) Mechanical AVR/ Bioprosthetic MVR 2009/ Hx of Parox Afib  INR 2.5--> 1.9. Continue Coumadin. Goal INR 2.5 -3.5     4) History of breast cancer, s/p left mastectomy 2019 - On Arimidex  5) Hyperlipidemia - On Lipitor  6) Restless leg syndrome - On Requip. 7) Recent lower GI bleed - needed transfusions at [facility]. Hb > 7 Monitor.     8) Acute on Chronic Anemia due to blood loss -   Hb drop 10.6 --> 7.9-> 8 and stable. No transfusion needed here  On coumadin and ASA - may need to hold if Hb dropping  Recently given PRBCs for Lower GI bleed  earlier this month at [facility]          Review of Systems   Constitutional: Positive for malaise/fatigue. Negative for fever and weight loss. Respiratory: Negative for cough and shortness of breath. Cardiovascular: Negative for chest pain, palpitations, orthopnea and leg swelling. Gastrointestinal: Negative for abdominal pain, blood in stool and melena. Genitourinary: Negative for dysuria and urgency. Neurological: Negative for dizziness, focal weakness and headaches. Most bothered by symptoms associated with restless leg syndrome which make it difficult for her to sleep.   She has been taking Requip 0.5 mg twice daily without adequate relief. Psychiatric/Behavioral: Negative for depression. Visit Vitals  /72 (BP 1 Location: Left arm, BP Patient Position: Sitting)   Pulse 61   Temp 98.6 °F (37 °C) (Temporal)   Resp 13   Ht 4' 10\" (1.473 m)   Wt 95 lb 9.6 oz (43.4 kg)   SpO2 96%   BMI 19.98 kg/m²       Physical Exam  Vitals signs and nursing note reviewed. Constitutional:       Appearance: She is well-developed. HENT:      Head: Normocephalic. Eyes:      Conjunctiva/sclera: Conjunctivae normal.   Neck:      Musculoskeletal: Neck supple. Vascular: No carotid bruit. Cardiovascular:      Rate and Rhythm: Normal rate and regular rhythm. Heart sounds: Murmur ( Blowing systolic murmur, proximal left sternal border without radiation to the left axilla or carotids) present. Pulmonary:      Effort: Pulmonary effort is normal.      Breath sounds: Rales ( Bibasilar crackles right greater than left consistent with previous exams) present. Skin:     General: Skin is warm and dry. Neurological:      Mental Status: She is alert and oriented to person, place, and time. Psychiatric:         Mood and Affect: Mood normal.         Behavior: Behavior normal.         Thought Content: Thought content normal.                     ASSESSMENT and PLAN    ICD-10-CM ICD-9-CM    1. Hospital discharge follow-up  Z09 V67.59    2. Chronic systolic congestive heart failure (HCC)  I50.22 428.22      428.0    3. Paroxysmal atrial fibrillation (HCC)  I48.0 427.31    4. Cardiomyopathy, unspecified type ([facility]  I42.9 425.4    5. Essential hypertension, benign  I10 401.1    6. CKD (chronic kidney disease) stage 3, GFR 30-59 ml/min (HCC)  N18.3 585.3    7. Needs flu shot  Z23 V04.81 FLU (FLUAD QUAD INFLUENZA VACCINE,QUAD,ADJUVANTED)   8.  Restless legs syndrome  G25.81 333.94 rOPINIRole (REQUIP) 0.5 mg tablet     Increase ropinirole to 0.5 mg, 3 tablets daily at bedtime  Continue with current medications pending subspecialty follow-up  Schedule Medicare wellness evaluation in June, follow-up sooner with any problems ambulatory

## 2020-09-17 ENCOUNTER — PATIENT OUTREACH (OUTPATIENT)
Dept: CASE MANAGEMENT | Age: 85
End: 2020-09-17

## 2020-09-17 NOTE — PROGRESS NOTES
Patient was admitted to [facility] on 8/24/20 and discharged on 8/28/20 for Resp failure/CHF/CAP/AVR/MVR. CTN Attempt to contact patient via telephone on 9/17/20 for  follow up. Unable to reach. Left message on voicemail with office contact information. No Patient medical information left on message.

## 2020-09-29 ENCOUNTER — PATIENT OUTREACH (OUTPATIENT)
Dept: CASE MANAGEMENT | Age: 85
End: 2020-09-29

## 2020-09-29 NOTE — PROGRESS NOTES
Patient was admitted to [facility] ([place], [place]) on 8/24/20 and discharged on 8/28/20 for Resp failure/CHF/CAP/AVR/MVR. Attempt to contact patient via telephone on 9/29/20 for follow up, Unable to reach. Left message on voicemail with office contact information. No Patient medical information left on message. Patient has graduated from the Transitions of Care Coordination  program on 9/29/20. No further Care Transition Nurse follow up scheduled. Noted no hospitalization admission post 30 days from discharge date 8/28/20. This episode is closed. Post Hospitalization Encounter Resolved.

## 2020-10-01 ENCOUNTER — PATIENT OUTREACH (OUTPATIENT)
Dept: CASE MANAGEMENT | Age: 85
End: 2020-10-01

## 2020-10-01 NOTE — PROGRESS NOTES
Complex Case Management      Date/Time:  10/1/2020 10:47 AM    Method of communication with patient:phone    [facility] ([facility]) contacted the patient by telephone to perform Ambulatory Care Coordination. Verified name and  (PHI) with patient as identifiers. Provided introduction to self, and explanation of the Ambulatory Care Manager's role. Reviewed most recent clinic visit w/ patient who verbalized understanding. Patient given an opportunity to ask questions. Top Challenges reviewed with the patient   1. Assumed care from [facility], RN who was following as a transition of care. Initiated Complex Care Management episode. 2. Pt w/ hx of CHF. Recent hosp admission for resp failure. 3. Pt states doing well, denies issues/questions, at this time. 4. Of note, pt w/ post [address] admission f/u w/ PCP on 9/10/20.  5. Upcoming appts sched w/ Pulm and PCP, Pt aware. The patient agrees to contact the PCP office or the [facility] for questions related to their healthcare. Provided contact information for future reference. Disease Specific:   N/A    Home Health Active: No    DME Active: No    Barriers to care? none    Advance Care Planning:   Does patient have an Advance Directive:  not on file; education provided     Medication(s):   Medication reconciliation was not performed with patient, who verbalizes understanding of administration of home medications. There were no barriers to obtaining medications identified at this time. Referral to Pharm D needed: no     Current Outpatient Medications   Medication Sig    bumetanide (BUMEX) 1 mg tablet Take  by mouth daily.  rOPINIRole (REQUIP) 0.5 mg tablet Take 3 tablets daily at bedtime    sacubitriL-valsartan (Entresto) 24-26 mg tablet Take 1 Tab by mouth two (2) times a day.     esomeprazole (NEXIUM) 40 mg capsule take 1 capsule by mouth once daily    carvediloL (COREG) 12.5 mg tablet take 1 tablet by mouth twice a day with food    aspirin delayed-release 81 mg tablet Take  by mouth daily.  warfarin (COUMADIN) 6 mg tablet Take 6 mg by mouth.  warfarin (COUMADIN) 2 mg tablet 2 mg.  atorvastatin (LIPITOR) 20 mg tablet take 1 tablet by mouth once daily    anastrozole (ARIMIDEX) 1 mg tablet Take 1 mg by mouth daily.  isosorbide mononitrate ER (IMDUR) 30 mg tablet Take 30 mg by mouth daily.  multivitamins-minerals-lutein (CENTRUM SILVER) Tab Take  by mouth.  calcium carbonate (CALTREX) 600 mg (1,500 mg) tablet Take 600 mg by mouth two (2) times a day. No current facility-administered medications for this visit. [facility] follow up appointment(s):   Future Appointments   Date Time Provider Sachin Dorina   10/12/2020  1:00 PM MD KIMBERLEY Chen BS AMB        Non-[facility] follow up appointment(s):   Yuliet benito w/ Dr. Jasper Pandya. On 10/5/20.     Goals Addressed                 This Visit's Progress     Attends follow up appointments on schedule        10/1/20 Patient will attend all scheduled appointments through 12/31/20       Knowledge and adherence of prescribed medication (ie. action, side effects, missed dose, etc.).        10/1/20 Pt will take all medications prescribed to be evaluated on each outreach through  12/31/20       Prepare patients and caregivers for end of life decisions (ie. need for hospice, pain management, symptom relief, advance directives etc.)        10/1/20 Pt will complete an ACP and have it scanned into their EMR by 12/31/20

## 2020-10-12 ENCOUNTER — OFFICE VISIT (OUTPATIENT)
Dept: FAMILY MEDICINE CLINIC | Age: 85
End: 2020-10-12
Payer: MEDICARE

## 2020-10-12 ENCOUNTER — APPOINTMENT (OUTPATIENT)
Dept: FAMILY MEDICINE CLINIC | Age: 85
End: 2020-10-12

## 2020-10-12 VITALS
DIASTOLIC BLOOD PRESSURE: 68 MMHG | OXYGEN SATURATION: 96 % | TEMPERATURE: 97.7 F | SYSTOLIC BLOOD PRESSURE: 128 MMHG | RESPIRATION RATE: 13 BRPM | HEIGHT: 58 IN | WEIGHT: 98.6 LBS | BODY MASS INDEX: 20.7 KG/M2 | HEART RATE: 60 BPM

## 2020-10-12 DIAGNOSIS — I10 ESSENTIAL HYPERTENSION, BENIGN: ICD-10-CM

## 2020-10-12 DIAGNOSIS — I48.0 PAROXYSMAL ATRIAL FIBRILLATION (HCC): ICD-10-CM

## 2020-10-12 DIAGNOSIS — I42.9 CARDIOMYOPATHY, UNSPECIFIED TYPE (HCC): ICD-10-CM

## 2020-10-12 DIAGNOSIS — I50.22 CHRONIC SYSTOLIC CONGESTIVE HEART FAILURE (HCC): ICD-10-CM

## 2020-10-12 DIAGNOSIS — R73.02 IMPAIRED GLUCOSE TOLERANCE: ICD-10-CM

## 2020-10-12 DIAGNOSIS — N18.30 STAGE 3 CHRONIC KIDNEY DISEASE, UNSPECIFIED WHETHER STAGE 3A OR 3B CKD (HCC): ICD-10-CM

## 2020-10-12 DIAGNOSIS — R53.83 FATIGUE, UNSPECIFIED TYPE: Primary | ICD-10-CM

## 2020-10-12 PROCEDURE — G8510 SCR DEP NEG, NO PLAN REQD: HCPCS | Performed by: FAMILY MEDICINE

## 2020-10-12 PROCEDURE — G8536 NO DOC ELDER MAL SCRN: HCPCS | Performed by: FAMILY MEDICINE

## 2020-10-12 PROCEDURE — 1090F PRES/ABSN URINE INCON ASSESS: CPT | Performed by: FAMILY MEDICINE

## 2020-10-12 PROCEDURE — G8420 CALC BMI NORM PARAMETERS: HCPCS | Performed by: FAMILY MEDICINE

## 2020-10-12 PROCEDURE — 99214 OFFICE O/P EST MOD 30 MIN: CPT | Performed by: FAMILY MEDICINE

## 2020-10-12 PROCEDURE — G8427 DOCREV CUR MEDS BY ELIG CLIN: HCPCS | Performed by: FAMILY MEDICINE

## 2020-10-12 PROCEDURE — 1101F PT FALLS ASSESS-DOCD LE1/YR: CPT | Performed by: FAMILY MEDICINE

## 2020-10-12 NOTE — PROGRESS NOTES
HISTORY OF PRESENT ILLNESS  Delilah Pichardo is a 80 y.o. female. She presents with a history of systolic congestive heart failure, previous placement of a [place] mitral valve, admission with severe GI bleed 2 months ago-anticoagulated on warfarin, today reporting persistent and worsening fatigue  Most recent echocardiogram in August showed decreased left ventricular function with an estimated ejection fraction of 70%, [place] valve appeared well-seated, mild to moderate tricuspid regurgitation was noted. NT pro BNP had risen from 7000 to 10,000 when last checked in August.  Review of Systems   Constitutional: Positive for malaise/fatigue. Negative for fever and weight loss. HENT: Negative for congestion. Respiratory: Negative for cough, shortness of breath ( Upon awakening) and wheezing. Cardiovascular: Positive for leg swelling ( Episodic lower extremity edema, resolves when legs are elevated). Negative for chest pain, palpitations, orthopnea, claudication and PND. Gastrointestinal: Negative for abdominal pain, blood in stool, constipation, diarrhea, heartburn, melena, nausea and vomiting. Genitourinary: Negative for dysuria and urgency. Neurological: Negative for dizziness and headaches. Visit Vitals  /68 (BP 1 Location: Right arm, BP Patient Position: Sitting)   Pulse 60   Temp 97.7 °F (36.5 °C) (Temporal)   Resp 13   Ht 4' 10\" (1.473 m)   Wt 98 lb 9.6 oz (44.7 kg)   SpO2 96%   BMI 20.61 kg/m²       Physical Exam  Vitals signs and nursing note reviewed. Constitutional:       Appearance: She is well-developed. HENT:      Head: Normocephalic. Eyes:      Conjunctiva/sclera: Conjunctivae normal.   Neck:      Musculoskeletal: Neck supple. Cardiovascular:      Rate and Rhythm: Normal rate and regular rhythm. Heart sounds: Murmur ( Diffuse precordial murmur) present.    Pulmonary:      Effort: Pulmonary effort is normal.      Breath sounds: Rales ( Bibasilar crackles unchanged from previous exams) present. Musculoskeletal:      Right lower leg: No edema. Left lower leg: No edema. Skin:     General: Skin is warm and dry. Neurological:      Mental Status: She is alert and oriented to person, place, and time. Psychiatric:         Mood and Affect: Mood normal.         Behavior: Behavior normal.         Thought Content: Thought content normal.         ASSESSMENT and PLAN    ICD-10-CM ICD-9-CM    1. Fatigue, unspecified type  R53.83 780.79 CBC WITH AUTOMATED DIFF      TSH 3RD GENERATION      METABOLIC PANEL, BASIC      VITAMIN B12   2. Chronic systolic congestive heart failure (HCC)  I50.22 428.22 NT-PRO BNP     428.0 VITAMIN B12   3. Cardiomyopathy, unspecified type (HCC)  I42.9 425.4 VITAMIN B12   4. Paroxysmal atrial fibrillation (HCC)  I48.0 427.31    5. Essential hypertension, benign  I10 401.1    6. Stage 3 chronic kidney disease, unspecified whether stage 3a or 3b CKD  N18.30 585.3 CBC WITH AUTOMATED DIFF      METABOLIC PANEL, BASIC   7.  Impaired glucose tolerance  R73.02 790.22        Continue current medications pending cardiology follow-up  Lab today further disposition pending lab results as indicated  Will forward lab results to cardiology consultant and time for follow-up appointment on 10/14/2020

## 2020-10-12 NOTE — PATIENT INSTRUCTIONS
Continue current medications pending cardiology follow-up Lab today further disposition pending lab results as indicated Will forward lab results to cardiology consultant and time for follow-up appointment on 10/14/2020

## 2020-10-12 NOTE — PROGRESS NOTES
Ann Garcia is a 80 y.o. female (: 1930) presenting to address:    Chief Complaint   Patient presents with    Fatigue     feeling very tired       Vitals:    10/12/20 1252   BP: 128/68   Pulse: 60   Resp: 13   Temp: 97.7 °F (36.5 °C)   TempSrc: Temporal   SpO2: 96%   Weight: 98 lb 9.6 oz (44.7 kg)   Height: 4' 10\" (1.473 m)   PainSc:   0 - No pain       Hearing/Vision:   No exam data present    Learning Assessment:     Learning Assessment 6/10/2020   PRIMARY LEARNER Patient   HIGHEST LEVEL OF EDUCATION - PRIMARY LEARNER  DID NOT GRADUATE HIGH SCHOOL   BARRIERS PRIMARY LEARNER NONE   CO-LEARNER CAREGIVER No   PRIMARY LANGUAGE ENGLISH    NEED No   LEARNER PREFERENCE PRIMARY VIDEOS   ANSWERED BY patient   RELATIONSHIP SELF     Depression Screening:     3 most recent PHQ Screens 10/12/2020   [facility] 36 Not Done Patient Decline   Little interest or pleasure in doing things Not at all   Feeling down, depressed, irritable, or hopeless Not at all   Total Score PHQ 2 0     Fall Risk Assessment:     Fall Risk Assessment, last 12 mths 10/12/2020   Able to walk? Yes   Fall in past 12 months? No   Fall with injury? -   Number of falls in past 12 months -   Fall Risk Score -     Abuse Screening:     Abuse Screening Questionnaire 6/10/2020   Do you ever feel afraid of your partner? N   Are you in a relationship with someone who physically or mentally threatens you? N   Is it safe for you to go home? Y     Coordination of Care Questionaire:   1. Have you been to the ER, urgent care clinic since your last visit? Hospitalized since your last visit? NO    2. Have you seen or consulted any other health care providers outside of the [address] [place] [address] since your last visit? Include any pap smears or colon screening. YES, cardiology    Advanced Directive:   1. Do you have an Advanced Directive? NO    2. Would you like information on Advanced Directives?  NO

## 2020-10-13 ENCOUNTER — TELEPHONE (OUTPATIENT)
Dept: FAMILY MEDICINE CLINIC | Age: 85
End: 2020-10-13

## 2020-10-13 LAB
BASOPHILS # BLD AUTO: 0 X10E3/UL (ref 0–0.2)
BASOPHILS NFR BLD AUTO: 1 %
BUN SERPL-MCNC: 29 MG/DL (ref 8–27)
BUN/CREAT SERPL: 26 (ref 12–28)
CALCIUM SERPL-MCNC: 9.7 MG/DL (ref 8.7–10.3)
CHLORIDE SERPL-SCNC: 101 MMOL/L (ref 96–106)
CO2 SERPL-SCNC: 22 MMOL/L (ref 20–29)
CREAT SERPL-MCNC: 1.1 MG/DL (ref 0.57–1)
EOSINOPHIL # BLD AUTO: 0.1 X10E3/UL (ref 0–0.4)
EOSINOPHIL NFR BLD AUTO: 2 %
ERYTHROCYTE [DISTWIDTH] IN BLOOD BY AUTOMATED COUNT: 13.8 % (ref 11.7–15.4)
GLUCOSE SERPL-MCNC: 109 MG/DL (ref 65–99)
HCT VFR BLD AUTO: 33.9 % (ref 34–46.6)
HGB BLD-MCNC: 10.9 G/DL (ref 11.1–15.9)
IMM GRANULOCYTES # BLD AUTO: 0 X10E3/UL (ref 0–0.1)
IMM GRANULOCYTES NFR BLD AUTO: 0 %
INTERPRETATION: NORMAL
LYMPHOCYTES # BLD AUTO: 0.5 X10E3/UL (ref 0.7–3.1)
LYMPHOCYTES NFR BLD AUTO: 11 %
MCH RBC QN AUTO: 33 PG (ref 26.6–33)
MCHC RBC AUTO-ENTMCNC: 32.2 G/DL (ref 31.5–35.7)
MCV RBC AUTO: 103 FL (ref 79–97)
MONOCYTES # BLD AUTO: 0.4 X10E3/UL (ref 0.1–0.9)
MONOCYTES NFR BLD AUTO: 11 %
NEUTROPHILS # BLD AUTO: 3 X10E3/UL (ref 1.4–7)
NEUTROPHILS NFR BLD AUTO: 75 %
NT-PROBNP SERPL-MCNC: 9445 PG/ML (ref 0–738)
PLATELET # BLD AUTO: 161 X10E3/UL (ref 150–450)
POTASSIUM SERPL-SCNC: 3.8 MMOL/L (ref 3.5–5.2)
RBC # BLD AUTO: 3.3 X10E6/UL (ref 3.77–5.28)
SODIUM SERPL-SCNC: 141 MMOL/L (ref 134–144)
TSH SERPL DL<=0.005 MIU/L-ACNC: 4.14 UIU/ML (ref 0.45–4.5)
VIT B12 SERPL-MCNC: 1087 PG/ML (ref 232–1245)
WBC # BLD AUTO: 4 X10E3/UL (ref 3.4–10.8)

## 2020-10-13 NOTE — TELEPHONE ENCOUNTER
Please fax today's lab results to        At [facility] cardiology

## 2020-10-15 ENCOUNTER — PATIENT OUTREACH (OUTPATIENT)
Dept: CASE MANAGEMENT | Age: 85
End: 2020-10-15

## 2020-10-15 NOTE — PROGRESS NOTES
Patient attended appointments with here PCP, Dr Tiffanie Smith on 10/12/20, Nurse Navigator reviewed EMR and will follow up on next scheduled outreach.

## 2020-10-19 ENCOUNTER — TELEPHONE (OUTPATIENT)
Dept: FAMILY MEDICINE CLINIC | Age: 85
End: 2020-10-19

## 2020-10-19 NOTE — TELEPHONE ENCOUNTER
The results were forwarded to her cardiology consultant and time for her appointment on 10/14/2020. The results did not show anything that would explain the dramatic worsening in her fatigue. Her kidney function was stable, her blood count was stable and the blood test that measures congestive heart failure showed improvement compared with previous levels.

## 2020-10-21 ENCOUNTER — PATIENT OUTREACH (OUTPATIENT)
Dept: CASE MANAGEMENT | Age: 85
End: 2020-10-21

## 2020-10-21 ENCOUNTER — TELEPHONE (OUTPATIENT)
Dept: FAMILY MEDICINE CLINIC | Age: 85
End: 2020-10-21

## 2020-10-21 NOTE — PROGRESS NOTES
Complex Case Management      Date/Time:  10/21/2020 10:47 AM    Method of communication with patient:phone    [address] ([facility]) contacted the patient by telephone to perform Ambulatory Care Coordination. Verified name and  (PHI) with patient as identifiers. Provided introduction to self, and explanation of the Ambulatory Care Manager's role. Reviewed most recent clinic visit w/ patient who verbalized understanding. Patient given an opportunity to ask questions. Top Challenges reviewed with the patient   1. Assumed care from [facility], RN who was following as a transition of care. 2. Pt w/ hx of CHF. Recent hosp admission for resp failure. 3. Pt states doing well, denies issues/questions, at this time. 4. Of note, pt w/ post [address] admission f/u w/ PCP on 9/10/20.  5. Upcoming appts sched w/ Pulm. 6. Pt attended recent PCP appt, labs were obtained and noted office was trying to reach the patient regarding results. Notified patient and her daughter and gave number, they stated they will call. The patient agrees to contact the PCP office or the [facility] for questions related to their healthcare. Provided contact information for future reference. Disease Specific:   N/A    Home Health Active: No    DME Active: No    Barriers to care? none    Advance Care Planning:   Does patient have an Advance Directive:  not on file; education provided     Medication(s):   Medication reconciliation was not performed with patient, who declined, but verbalizes understanding of administration of home medications. There were no barriers to obtaining medications identified at this time. Referral to Pharm D needed: no     Current Outpatient Medications   Medication Sig    bumetanide (BUMEX) 1 mg tablet Take  by mouth daily.  rOPINIRole (REQUIP) 0.5 mg tablet Take 3 tablets daily at bedtime    sacubitriL-valsartan (Entresto) 24-26 mg tablet Take 1 Tab by mouth two (2) times a day.     esomeprazole (NEXIUM) 40 mg capsule take 1 capsule by mouth once daily    carvediloL (COREG) 12.5 mg tablet take 1 tablet by mouth twice a day with food    aspirin delayed-release 81 mg tablet Take  by mouth daily.  warfarin (COUMADIN) 6 mg tablet Take 6 mg by mouth.  warfarin (COUMADIN) 2 mg tablet 2 mg.  atorvastatin (LIPITOR) 20 mg tablet take 1 tablet by mouth once daily    anastrozole (ARIMIDEX) 1 mg tablet Take 1 mg by mouth daily.  isosorbide mononitrate ER (IMDUR) 30 mg tablet Take 30 mg by mouth daily.  multivitamins-minerals-lutein (CENTRUM SILVER) Tab Take  by mouth.  calcium carbonate (CALTREX) 600 mg (1,500 mg) tablet Take 600 mg by mouth two (2) times a day. No current facility-administered medications for this visit. [facility] follow up appointment(s):   No future appointments. Non-[facility] follow up appointment(s):   Pulm appt w/ Dr. Leah Arango. On 10/5/20. Goals Addressed                 This Visit's Progress     Attends follow up appointments on schedule   On track     10/1/20 Patient will attend all scheduled appointments through 12/31/20       Knowledge and adherence of prescribed medication (ie. action, side effects, missed dose, etc.).    On track     10/1/20 Pt will take all medications prescribed to be evaluated on each outreach through  12/31/20       Prepare patients and caregivers for end of life decisions (ie. need for hospice, pain management, symptom relief, advance directives etc.)   On track     10/1/20 Pt will complete an ACP and have it scanned into their EMR by 12/31/20

## 2020-10-21 NOTE — TELEPHONE ENCOUNTER
Please advise that her vitamin B12 level is excellent and it would not be of any help for her to take extra vitamin B12 or to get vitamin B12 shots. The best way to control swelling in her legs would be to elevate her legs higher than her heart is much as possible. We do not want to give her increased doses of fluid medicine since that can cause dehydration and kidney damage. It would be helpful to stay away from salt as well.

## 2020-10-21 NOTE — TELEPHONE ENCOUNTER
Informed pt of results; pt would like to know if Vit B12 injections would be helpful and what should she do about the fluid in her legs?

## 2020-10-30 ENCOUNTER — VIRTUAL VISIT (OUTPATIENT)
Dept: FAMILY MEDICINE CLINIC | Age: 85
End: 2020-10-30
Payer: MEDICARE

## 2020-10-30 DIAGNOSIS — G25.81 RESTLESS LEGS SYNDROME: Primary | ICD-10-CM

## 2020-10-30 PROCEDURE — 99441 PR PHYS/QHP TELEPHONE EVALUATION 5-10 MIN: CPT | Performed by: FAMILY MEDICINE

## 2020-10-30 RX ORDER — ROPINIROLE 1 MG/1
3 TABLET, FILM COATED ORAL
Qty: 90 TAB | Refills: 5 | Status: SHIPPED | OUTPATIENT
Start: 2020-10-30 | End: 2020-12-31 | Stop reason: SDUPTHER

## 2020-10-30 NOTE — PROGRESS NOTES
Bryant Streeter is a 80 y.o. female (: 1930) presenting to address:    Chief Complaint   Patient presents with    Restless Leg Syndrome     c/o legs feeling like ants are playing ball; pain 10/10; last 2-3 nights, unable to sleep; wants to discuss lab results and B12 medication       There were no vitals filed for this visit. Hearing/Vision:   No exam data present    Learning Assessment:     Learning Assessment 6/10/2020   PRIMARY LEARNER Patient   HIGHEST LEVEL OF EDUCATION - PRIMARY LEARNER  DID NOT GRADUATE HIGH SCHOOL   BARRIERS PRIMARY LEARNER NONE   CO-LEARNER CAREGIVER No   PRIMARY LANGUAGE ENGLISH    NEED No   LEARNER PREFERENCE PRIMARY VIDEOS   ANSWERED BY patient   RELATIONSHIP SELF     Depression Screening:     3 most recent PHQ Screens 10/12/2020   [facility] 36 Not Done Patient Decline   Little interest or pleasure in doing things Not at all   Feeling down, depressed, irritable, or hopeless Not at all   Total Score PHQ 2 0     Fall Risk Assessment:     Fall Risk Assessment, last 12 mths 10/12/2020   Able to walk? Yes   Fall in past 12 months? No   Fall with injury? -   Number of falls in past 12 months -   Fall Risk Score -     Abuse Screening:     Abuse Screening Questionnaire 6/10/2020   Do you ever feel afraid of your partner? N   Are you in a relationship with someone who physically or mentally threatens you? N   Is it safe for you to go home? Y     Coordination of Care Questionaire:   1. Have you been to the ER, urgent care clinic since your last visit? Hospitalized since your last visit? NO    2. Have you seen or consulted any other health care providers outside of the [address] [place] [address] since your last visit? Include any pap smears or colon screening. NO    Advanced Directive:   1. Do you have an Advanced Directive? NO    2. Would you like information on Advanced Directives?  NO

## 2020-10-30 NOTE — PROGRESS NOTES
Domingo Singh is a 80 y.o. female who was seen by synchronous (real-time) telephone encounter    Mr#: [id number]    Consent:  She and/or her healthcare decision maker is aware that this patient-initiated telephone encounter is a billable service, with coverage as determined by her insurance carrier. She is aware that she may receive a bill and has provided verbal consent to proceed: YES    I was in the office while conducting this encounter. 712  HPI/Subjective:     She reports that she continues to experience interrupted sleep because of restless leg symptoms on Requip 0.5 mg, 3 tablets daily for a total dosage of 1.5 mg. Once again her daughter inquires as to whether or not she should get B12 shots and is reminded that we have discussed this several times before and that we have checked a B12 level which is at the upper limits of normal so that there is no indication for B12 injections. Patient Active Problem List   Diagnosis Code    History of heart valve replacement Z95.2    Essential hypertension, benign I10    Other hyperlipidemia E78.49    Atrial fibrillation ([address] I48.91    Osteoarthritis of knee M17.10    Osteoporosis M81.0    History of total knee replacement Z96.659    Acute congestive heart failure (HCC) I50.9    Cardiomyopathy ([facility] I42.9    Prediabetes R73.03    CKD (chronic kidney disease) stage 3, GFR 30-59 ml/min N18.30    Gastroesophageal reflux disease K21.9    UGIB (upper gastrointestinal bleed) K92.2    Insomnia G47.00    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 , Disp: , Rfl:     rOPINIRole (REQUIP) 0.5 mg tablet, Take 3 tablets daily at bedtime, Disp: 90 Tab, Rfl: 2    sacubitriL-valsartan (Entresto) 24-26 mg tablet, Take 1 Tab by mouth two (2) times a day., Disp: , Rfl:     esomeprazole (NEXIUM) 40 mg capsule, take 1 capsule by mouth once daily, Disp: 90 Cap, Rfl: 1    carvediloL (COREG) 12.5 mg tablet, take 1 tablet by mouth twice a day with food, Disp: 60 Tab, Rfl: 5    aspirin delayed-release 81 mg tablet, Take  by mouth daily. , Disp: , Rfl:     warfarin (COUMADIN) 6 mg tablet, Take 6 mg by mouth., Disp: , Rfl:     warfarin (COUMADIN) 2 mg tablet, 2 mg., Disp: , Rfl: 0    atorvastatin (LIPITOR) 20 mg tablet, take 1 tablet by mouth once daily, Disp: 90 Tab, Rfl: 3    anastrozole (ARIMIDEX) 1 mg tablet, Take 1 mg by mouth daily. , Disp: , Rfl:     isosorbide mononitrate ER (IMDUR) 30 mg tablet, Take 30 mg by mouth daily. , Disp: , Rfl:     multivitamins-minerals-lutein (CENTRUM SILVER) Tab, Take  by mouth.  , Disp: , Rfl:     calcium carbonate (CALTREX) 600 mg (1,500 mg) tablet, Take 600 mg by mouth two (2) times a day.  , Disp: , Rfl:      No Known Allergies      Review of Systems   Constitutional: Positive for malaise/fatigue. Negative for fever. Respiratory: Negative for shortness of breath. Cardiovascular: Negative for chest pain, palpitations and orthopnea. Gastrointestinal: Negative for abdominal pain. Psychiatric/Behavioral: The patient has insomnia ( Insomnia secondary to restless legs symptoms).           PHYSICAL EXAMINATION:    Constitutional: [x] Appears well-developed and well-nourished [x] No apparent distress        Mental status: [x] Alert and awake  [x] Oriented t [x] Able to follow commands      Eyes:   EOM    [x]  Normal        HENT: [x] Normocephalic,      Pulmonary/Chest: [x] Respiratory effort normal   [x] No visualized signs of difficulty breathing or respiratory distress           Musculoskeletal:   [x] No obvious deformities noted with regard to areas viewed           Neurological:        [x] No apparent neurologic deficits noted in areas viewed                 Skin:        [x] No apparent dermatologic abnormalities noted in areas viewed               Psychiatric:       [x] Normal Affect Other pertinent observable physical exam findings:-None noted    Assessment & Plan:   Diagnoses and all orders for this visit:    1. Restless legs syndrome  -     rOPINIRole (REQUIP) 1 mg tablet; Take 3 Tabs by mouth nightly. Increase ropinirole to 3 mg nightly. Use up left over 0.5 mg tablets by taking 6 tablets at bedtime then begin a new prescription for 1 mg tablets, 3 tablets at bedtime  Follow-up for new symptoms, worsening symptoms or failure to improve      [facility] the patient is at home and the provider is at [facility] and [facility], assisted with the telehealth visit. The duration of the telemedicine encounter was 5 minutes. Pursuant to the emergency declaration under the [facility] waiver authority and the [employer] and [employer] General Act, this Virtual  Visit was conducted, with patient's consent, to reduce the patient's risk of exposure to COVID-19 and provide continuity of care for an established patient. Services were provided through a video synchronous discussion virtually to substitute for in-person clinic visit. Viridiana Valente MD    e present.

## 2020-10-30 NOTE — PATIENT INSTRUCTIONS
Increase ropinirole to 3 mg nightly. Use up left over 0.5 mg tablets by taking 6 tablets at bedtime then begin a new prescription for 1 mg tablets, 3 tablets at bedtime Follow-up for new symptoms, worsening symptoms or failure to improve

## 2020-11-02 ENCOUNTER — PATIENT OUTREACH (OUTPATIENT)
Dept: CASE MANAGEMENT | Age: 85
End: 2020-11-02

## 2020-11-09 ENCOUNTER — PATIENT OUTREACH (OUTPATIENT)
Dept: CASE MANAGEMENT | Age: 85
End: 2020-11-09

## 2020-11-09 NOTE — PROGRESS NOTES
Complex Case Management      Date/Time:  2020 10:47 AM    Method of communication with patient:phone    [address] [place] ([facility]) contacted the patient by telephone to perform Ambulatory Care Coordination. Verified name and  (PHI) with patient as identifiers. Provided introduction to self, and explanation of the Ambulatory Care Manager's role. Reviewed most recent clinic visit w/ patient who verbalized understanding. Patient given an opportunity to ask questions. Top Challenges reviewed with the patient   1. Assumed care from [facility], RN who was following as a transition of care. 2. Pt w/ hx of CHF. Recent hosp admission for resp failure. 3. Pt states doing well, denies issues/questions, at this time. 4. Of note, pt w/ post [address] admission f/u w/ PCP on 9/10/20.  5. Pt attended recent PCP appt. Increase in requip for RLS. The patient agrees to contact the PCP office or the [address] [place] for questions related to their healthcare. Provided contact information for future reference. Disease Specific:   N/A    Home Health Active: No    DME Active: No    Barriers to care? none    Advance Care Planning:   Does patient have an Advance Directive:  not on file; education provided     Medication(s):   Medication reconciliation was not performed with patient, who again declined, but verbalizes understanding of administration of home medications. There were no barriers to obtaining medications identified at this time. Referral to Pharm D needed: no     Current Outpatient Medications   Medication Sig    rOPINIRole (REQUIP) 1 mg tablet Take 3 Tabs by mouth nightly.  bumetanide (BUMEX) 1 mg tablet Take  by mouth daily.  sacubitriL-valsartan (Entresto) 24-26 mg tablet Take 1 Tab by mouth two (2) times a day.     esomeprazole (NEXIUM) 40 mg capsule take 1 capsule by mouth once daily    carvediloL (COREG) 12.5 mg tablet take 1 tablet by mouth twice a day with food    aspirin delayed-release 81 mg tablet Take  by mouth daily.  warfarin (COUMADIN) 6 mg tablet Take 6 mg by mouth.  warfarin (COUMADIN) 2 mg tablet 2 mg.  atorvastatin (LIPITOR) 20 mg tablet take 1 tablet by mouth once daily    anastrozole (ARIMIDEX) 1 mg tablet Take 1 mg by mouth daily.  isosorbide mononitrate ER (IMDUR) 30 mg tablet Take 30 mg by mouth daily.  multivitamins-minerals-lutein (CENTRUM SILVER) Tab Take  by mouth.  calcium carbonate (CALTREX) 600 mg (1,500 mg) tablet Take 600 mg by mouth two (2) times a day. No current facility-administered medications for this visit. [facility] follow up appointment(s):   No future appointments. Non-[facility] follow up appointment(s):       Goals Addressed                 This Visit's Progress     Attends follow up appointments on schedule   On track     10/1/20 Patient will attend all scheduled appointments through 12/31/20       Knowledge and adherence of prescribed medication (ie. action, side effects, missed dose, etc.).    On track     10/1/20 Pt will take all medications prescribed to be evaluated on each outreach through  12/31/20       Prepare patients and caregivers for end of life decisions (ie. need for hospice, pain management, symptom relief, advance directives etc.)   On track     10/1/20 Pt will complete an ACP and have it scanned into their EMR by 12/31/20

## 2020-11-16 ENCOUNTER — PATIENT OUTREACH (OUTPATIENT)
Dept: CASE MANAGEMENT | Age: 85
End: 2020-11-16

## 2020-11-23 RX ORDER — BUMETANIDE 2 MG/1
TABLET ORAL
Qty: 90 TAB | OUTPATIENT
Start: 2020-11-23

## 2020-11-28 DIAGNOSIS — G47.00 INSOMNIA, UNSPECIFIED TYPE: ICD-10-CM

## 2020-11-28 NOTE — TELEPHONE ENCOUNTER
Previously removed from medication list, reported as not a current medication, please check with the patient to see if she is in fact still taking trazodone

## 2020-11-30 ENCOUNTER — OFFICE VISIT (OUTPATIENT)
Dept: FAMILY MEDICINE CLINIC | Age: 85
End: 2020-11-30
Payer: MEDICARE

## 2020-11-30 ENCOUNTER — APPOINTMENT (OUTPATIENT)
Dept: FAMILY MEDICINE CLINIC | Age: 85
End: 2020-11-30

## 2020-11-30 VITALS
HEIGHT: 58 IN | SYSTOLIC BLOOD PRESSURE: 150 MMHG | HEART RATE: 62 BPM | BODY MASS INDEX: 23.05 KG/M2 | RESPIRATION RATE: 16 BRPM | DIASTOLIC BLOOD PRESSURE: 80 MMHG | WEIGHT: 109.8 LBS | TEMPERATURE: 97 F | OXYGEN SATURATION: 93 %

## 2020-11-30 DIAGNOSIS — N18.30 STAGE 3 CHRONIC KIDNEY DISEASE, UNSPECIFIED WHETHER STAGE 3A OR 3B CKD (HCC): ICD-10-CM

## 2020-11-30 DIAGNOSIS — I50.22 CHRONIC SYSTOLIC CONGESTIVE HEART FAILURE (HCC): Primary | ICD-10-CM

## 2020-11-30 DIAGNOSIS — E87.1 HYPONATREMIA: ICD-10-CM

## 2020-11-30 PROCEDURE — G8536 NO DOC ELDER MAL SCRN: HCPCS | Performed by: FAMILY MEDICINE

## 2020-11-30 PROCEDURE — G8420 CALC BMI NORM PARAMETERS: HCPCS | Performed by: FAMILY MEDICINE

## 2020-11-30 PROCEDURE — 99214 OFFICE O/P EST MOD 30 MIN: CPT | Performed by: FAMILY MEDICINE

## 2020-11-30 PROCEDURE — G8427 DOCREV CUR MEDS BY ELIG CLIN: HCPCS | Performed by: FAMILY MEDICINE

## 2020-11-30 PROCEDURE — G8510 SCR DEP NEG, NO PLAN REQD: HCPCS | Performed by: FAMILY MEDICINE

## 2020-11-30 PROCEDURE — 1101F PT FALLS ASSESS-DOCD LE1/YR: CPT | Performed by: FAMILY MEDICINE

## 2020-11-30 PROCEDURE — 1090F PRES/ABSN URINE INCON ASSESS: CPT | Performed by: FAMILY MEDICINE

## 2020-11-30 RX ORDER — TRAZODONE HYDROCHLORIDE 50 MG/1
TABLET ORAL
Qty: 90 TAB | Refills: 1 | Status: SHIPPED | OUTPATIENT
Start: 2020-11-30 | End: 2020-11-30

## 2020-11-30 NOTE — PROGRESS NOTES
HISTORY OF PRESENT ILLNESS  Arlinda Apgar is a 80 y.o. female. A she and her daughter report concern about lower extremity swelling as well as some swelling in her arms. She states that she has difficulty \"getting herself together\" in the morning. 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 Chart review reveals recent lab studies from 2020 with significant hyponatremia, sodium level of 129                      Mr#: [id number]      Patient Active Problem List   Diagnosis Code    History of heart valve replacement Z95.2    Essential hypertension, benign I10    Other hyperlipidemia E78.49    Atrial fibrillation ([facility] [address] I48.91    Osteoarthritis of knee M17.10    Osteoporosis M81.0    History of total knee replacement Z96.659    Acute congestive heart failure (HCC) I50.9    Cardiomyopathy ([facility] I42.9    Prediabetes R73.03    CKD (chronic kidney disease) stage 3, GFR 30-59 ml/min N18.30    Gastroesophageal reflux disease K21.9    UGIB (upper gastrointestinal bleed) K92.2    Insomnia G47.00    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 , Disp: , Rfl:     sacubitriL-valsartan (Entresto) 24-26 mg tablet, Take 1 Tab by mouth two (2) times a day., Disp: , Rfl:     esomeprazole (NEXIUM) 40 mg capsule, take 1 capsule by mouth once daily, Disp: 90 Cap, Rfl: 1    carvediloL (COREG) 12.5 mg tablet, take 1 tablet by mouth twice a day with food, Disp: 60 Tab, Rfl: 5    aspirin delayed-release 81 mg tablet, Take  by mouth daily. , Disp: , Rfl:     warfarin (COUMADIN) 6 mg tablet, Take 6 mg by mouth., Disp: , Rfl:     warfarin (COUMADIN) 2 mg tablet, 2 mg., Disp: , Rfl: 0    atorvastatin (LIPITOR) 20 mg tablet, take 1 tablet by mouth once daily, Disp: 90 Tab, Rfl: 3    anastrozole (ARIMIDEX) 1 mg tablet, Take 1 mg by mouth daily. , Disp: , Rfl:     isosorbide mononitrate ER (IMDUR) 30 mg tablet, Take 30 mg by mouth daily. , Disp: , Rfl:     multivitamins-minerals-lutein (CENTRUM SILVER) Tab, Take  by mouth.  , Disp: , Rfl:     calcium carbonate (CALTREX) 600 mg (1,500 mg) tablet, Take 600 mg by mouth two (2) times a day.  , Disp: , Rfl:      No Known Allergies    Review of Systems   Constitutional: Positive for malaise/fatigue. Negative for fever and weight loss. Reports a good appetite and that she has added cheesecake to her diet in an attempt to gain weight   Respiratory: Positive for shortness of breath ( Short of breath with exertion but at baseline). Cardiovascular: Positive for leg swelling. Negative for chest pain, palpitations, orthopnea and PND. Left arm edema    Reports that she has been given a fluid restriction in the past by her cardiologist   Gastrointestinal: Negative for abdominal pain, constipation, diarrhea, nausea and vomiting. Genitourinary: Negative for dysuria and frequency. Neurological: Negative for dizziness and headaches. Psychiatric/Behavioral: Positive for hallucinations ( The patient reports that at times when she is eating, the food and the spoon seem to \"disappear\" before getting to her mouth, her daughter reports that she has hurt her mother describes seeing things that are not there. ). Negative for depression.        Visit Vitals  BP (!) 150/80 (BP 1 Location: Left arm, BP Patient Position: Sitting)   Pulse 62   Temp 97 °F (36.1 °C) (Temporal)   Resp 16   Ht 4' 10\" (1.473 m)   Wt 109 lb 12.8 oz (49.8 kg)   SpO2 93%   BMI 22.95 kg/m²       Physical Exam  Vitals signs and nursing note reviewed. Constitutional:       General: She is not in acute distress. Appearance: Normal appearance. She is well-developed. She is not ill-appearing. Comments: 11 pound weight gain since 10/12/2020   HENT:      Head: Normocephalic. Eyes:      Conjunctiva/sclera: Conjunctivae normal.   Neck:      Musculoskeletal: Neck supple. Cardiovascular:      Rate and Rhythm: Normal rate. Rhythm irregular. Comments: Continues to report that leg edema improves/resolves with elevation of the legs      Heart sounds consistent with mechanical valve  Pulmonary:      Effort: Pulmonary effort is normal.      Breath sounds: Normal breath sounds. Musculoskeletal:         General: Swelling ( Left arm, same site as previous mastectomy, question lymphedema) present. Right lower leg: Edema ( Pitting pretibial and pedal edema) present. Left lower leg: Edema ( Pitting pretibial and pedal edema) present. Skin:     General: Skin is warm and dry. Neurological:      Mental Status: She is alert and oriented to person, place, and time. Psychiatric:         Mood and Affect: Mood normal.         Behavior: Behavior normal.         ASSESSMENT and PLAN    ICD-10-CM ICD-9-CM    1. Chronic systolic congestive heart failure (HCC)  I50.22 428.22      428.0    2. Stage 3 chronic kidney disease, unspecified whether stage 3a or 3b CKD  T68.04 742.9 METABOLIC PANEL, BASIC   3.  Hyponatremia  N39.2 370.5 METABOLIC PANEL, BASIC   Significant weight gain without evidence of increasing symptoms of congestive heart failure and the patient reports having increased the well-hydrated content of her diet significantly  Mental status changes, possibly associated with hyponatremia however the patient is 80years old with a long history of chronic health problems with multiple possibilities for other etiologies  I am reluctant to consider increasing her diuretic and potentially exacerbating the hyponatremia and renal insufficiency    BMP today, further disposition pending results, results will be forwarded to cardiology consultant  Elevated legs higher than the heart is much as possible  Maintain fluid restriction  Keep cardiology appointment scheduled later this week

## 2020-11-30 NOTE — PATIENT INSTRUCTIONS
BMP today, further disposition pending results, results will be forwarded to cardiology consultant Elevated legs higher than the heart is much as possible Maintain fluid restriction Keep cardiology appointment scheduled later this week

## 2020-11-30 NOTE — PROGRESS NOTES
Ariela Mejia is a 80 y.o. female (: 1930) presenting to address:    Chief Complaint   Patient presents with    Ankle swelling     swelling in arms       Vitals:    20 1408   BP: (!) 150/80   Pulse: 62   Resp: 16   Temp: 97 °F (36.1 °C)   TempSrc: Temporal   SpO2: 93%   Weight: 109 lb 12.8 oz (49.8 kg)   Height: 4' 10\" (1.473 m)   PainSc:   0 - No pain       Hearing/Vision:   No exam data present    Learning Assessment:     Learning Assessment 6/10/2020   PRIMARY LEARNER Patient   HIGHEST LEVEL OF EDUCATION - PRIMARY LEARNER  DID NOT GRADUATE HIGH SCHOOL   BARRIERS PRIMARY LEARNER NONE   CO-LEARNER CAREGIVER No   PRIMARY LANGUAGE ENGLISH    NEED No   LEARNER PREFERENCE PRIMARY VIDEOS   ANSWERED BY patient   RELATIONSHIP SELF     Depression Screening:     3 most recent PHQ Screens 2020   [facility] Not Done Patient Decline   Little interest or pleasure in doing things Not at all   Feeling down, depressed, irritable, or hopeless Not at all   Total Score PHQ 2 0     Fall Risk Assessment:     Fall Risk Assessment, last 12 mths 2020   Able to walk? Yes   Fall in past 12 months? No   Fall with injury? -   Number of falls in past 12 months -   Fall Risk Score -     Abuse Screening:     Abuse Screening Questionnaire 6/10/2020   Do you ever feel afraid of your partner? N   Are you in a relationship with someone who physically or mentally threatens you? N   Is it safe for you to go home? Y     Coordination of Care Questionaire:   1. Have you been to the ER, urgent care clinic since your last visit? Hospitalized since your last visit? NO    2. Have you seen or consulted any other health care providers outside of the [address] [place] [address] since your last visit? Include any pap smears or colon screening. YES, cardiology    Advanced Directive:   1. Do you have an Advanced Directive? NO    2. Would you like information on Advanced Directives?  NO

## 2020-12-01 LAB
BUN SERPL-MCNC: 31 MG/DL (ref 8–27)
BUN/CREAT SERPL: 29 (ref 12–28)
CALCIUM SERPL-MCNC: 9.1 MG/DL (ref 8.7–10.3)
CHLORIDE SERPL-SCNC: 90 MMOL/L (ref 96–106)
CO2 SERPL-SCNC: 28 MMOL/L (ref 20–29)
CREAT SERPL-MCNC: 1.06 MG/DL (ref 0.57–1)
GLUCOSE SERPL-MCNC: 133 MG/DL (ref 65–99)
INTERPRETATION: NORMAL
POTASSIUM SERPL-SCNC: 4.5 MMOL/L (ref 3.5–5.2)
SODIUM SERPL-SCNC: 129 MMOL/L (ref 134–144)

## 2020-12-01 NOTE — PROGRESS NOTES
Please fax these results to the patient's cardiologist and advised the patient/her daughter that lab results continue to show a low sodium level but it has not worsened since it was previously checked on 11/20/2020

## 2020-12-01 NOTE — PROGRESS NOTES
Left message for pt to rtn call to discuss results and get name of cardiologist. Carissa Hurst w/pt's daughter and was given the name of cardiologist; faxed lab results to [facility] [phone] phone; [phone] fax.

## 2020-12-08 ENCOUNTER — PATIENT OUTREACH (OUTPATIENT)
Dept: CASE MANAGEMENT | Age: 85
End: 2020-12-08

## 2020-12-18 ENCOUNTER — TELEPHONE (OUTPATIENT)
Dept: FAMILY MEDICINE CLINIC | Age: 85
End: 2020-12-18

## 2020-12-18 NOTE — TELEPHONE ENCOUNTER
Spoke w/pt's daughter, Juan J Calloway and she states pt is hallucinating, seeing things that are not there, accusing people of doing things to her that they didn't; after consulting w/PCP, he advises pt to go ER for further evaluation. Consulted w/pt's daughter to bring pt to ER.

## 2020-12-18 NOTE — TELEPHONE ENCOUNTER
Pt daughter called to get some advice. Pt is having hallucinations and daughter wanted to know if she could receive suggestions on how to handle it or what can be done for the pt. Please advise.

## 2020-12-21 ENCOUNTER — PATIENT OUTREACH (OUTPATIENT)
Dept: CASE MANAGEMENT | Age: 85
End: 2020-12-21

## 2020-12-21 NOTE — PROGRESS NOTES
Patient was admitted to [facility] on 2020 and discharged on 2020 for CHF and then she was admitted to [facility] 2020 and discharged on 2020 for hallucinations. Outreach made within 2 business days of discharge: Yes    Top Discharge Challenges to be reviewed by the provider   Additional needs identified to be addressed with provider no  none  Discussed COVID-19 related testing which was not done at this time. Test results were not done. Patient informed of results, if available? no   Method of communication with provider : none       Advance Care Planning:   Does patient have an Advance Directive:  health care decision makers updated    Inpatient Readmission Risk score: 40%  Was this a readmission? yes   Patient stated reason for the admission: Admitted  to  for hallucinations, Admitted - for heart failure  Patients top risk factors for readmission: functional cognitive ability, functional physical ability, medical condition and support system  Interventions to address risk factors: Patient to have [place] and follow up appointments with PCP    Care Transition Nurse (CTN) contacted the patient by telephone to perform post hospital discharge assessment. Verified name and  with patient as identifiers. Provided introduction to self, and explanation of the CTN role. CTN reviewed discharge instructions, medical action plan and red flags with patient who verbalized understanding. Patient given an opportunity to ask questions and does not have any further questions or concerns at this time. The patient agrees to contact the PCP office for questions related to their healthcare. Medication reconciliation was performed with patient, who verbalizes understanding of administration of home medications. Advised obtaining a 90-day supply of all daily and as-needed medications.    Referral to Pharm D needed: no     Home Health/Outpatient orders at discharge: home health care  [address]: unsure  Date of initial visit: 12/22/2020    Durable Medical Equipment ordered at discharge: none at present. [address]: n/a  Durable Medical Equipment received: n/a    Covid Risk Education    Patient has following risk factors of: chronic kidney disease and HTN, cardiomyopathy, A-fib, Gerd, memory issues, Advanced age. Education provided regarding infection prevention, and signs and symptoms of COVID-19 and when to seek medical attention with patient who verbalized understanding. Discussed exposure protocols and quarantine From CDC: Are you at higher risk for severe illness?  and given an opportunity for questions and concerns. The patient agrees to contact the COVID-19 hotline [phone] or PCP office for questions related to COVID-19. For more information on steps you can take to protect yourself, see CDC's How to Protect Yourself     Patient/family/caregiver given information for [employer] and agrees to enroll no  Patient's preferred e-mail: declines  Patient's preferred phone number: declines    Discussed follow-up appointments. If no appointment was previously scheduled, appointment scheduling offered: yes  [facility] follow up appointment(s):   Future Appointments   Date Time Provider Sachin Cam   12/31/2020  1:30 PM Brittany Berger MD BSMA [facility]     Non-BS follow up appointment(s): n/a  Plan for follow-up call in 5-7 days based on severity of symptoms and risk factors. CTN provided contact information for future needs. Goals Addressed                 This Visit's Progress     Prevent complications post hospitalization. 1. CTN will monitor X 4 weeks    2. Ensure provider appt is scheduled within 7 days post-discharge 12/21/2020 Patient stated that she did not call to schedule appt. Will place her on list to     3. Confirm patient attended post-discharge provider apt    4.  Complete post-visit call to confirm attendance and update care needs  12/21/2020 Patient stated that she was doing well at time of call. No care needs noted. 5. Review/educate common or potential \"red flags\" of condition worsening 12/21/2020 Discussed signs and symptoms of CHF such as weight gain 3# in one day or 5# in a week, swelling in abdomen or legs, SOB, fatigue, confusion or memory issues to name a few. Patient stated that her daughter keeps a close eye on her. 6. Evaluate adherence to medications and priority barriers to resolve   12/21/2020 Patient stated that she has all medications and she takes them as directed. 7. Instruct on adherence to medications as ordered and assess for therapeutic response and side-effects    12/21/2020 Patient expressed that she knows why she takes medications and is aware of when she needs to call physician. She stated daughter watches her and will call as necessary. 8. Discuss and evaluate ADL performance. Provide recommendations on energy conservation, particularly related to transition home from an inpatient admission. 12/21/2020 Patient stated that she uses a cane for mobilization. Patient stated that she moves about some and then she rests.

## 2020-12-24 ENCOUNTER — PATIENT OUTREACH (OUTPATIENT)
Dept: CASE MANAGEMENT | Age: 85
End: 2020-12-24

## 2020-12-24 NOTE — PROGRESS NOTES
Pt to Transition of Care episode post recent admission. Will close Complex Case Management episode and resume care upon completion of ABELARDO.

## 2020-12-28 ENCOUNTER — PATIENT OUTREACH (OUTPATIENT)
Dept: CASE MANAGEMENT | Age: 85
End: 2020-12-28

## 2020-12-28 NOTE — PROGRESS NOTES
Transitions of Care Coordination  Follow-Up    Date/Time:  2020 4:44 PM     CTN (Care Transitions Nurse) contacted patient for Transitions of Care Coordination  follow up. Verified 2 patient identifiers (name and ). Spoke to patient. Introduced self/role and reason for call. Patient reported:   Patient stated that she is doing well. She stated that she has all meds and is taking as directed. She stated that she is using a cane/walker to get around. Encouraged patient to also do leg lifts and arm circles. Pertinent negatives:  Patient needs assistance. Specialist appointments since last outreach? no  If so, specialist and date: n/a    Medications:   New medications since last outreach: no  Does patient need refills on any medications: no  Medication changes since last outreach (dose adjustments or discontinued meds): no    Home Health company: ?? Date of discharge: unknown    Barriers to care?  functional physical ability, medical condition, support system        Patient reminded that there are physicians on call 24 hours a day / 7 days a week (M-F 5pm to 8am and from Friday 5pm until Monday 8a for the weekend) should the patient have questions or concerns.      Future Appointments   Date Time Provider Sachin aCm   2020  1:30 PM Pb Fagan MD [facility] AMB        Other upcoming appointments:  n/a    Goals      Attends follow up appointments on schedule      10/1/20 Patient will attend all scheduled appointments through 20       Knowledge and adherence of prescribed medication (ie. action, side effects, missed dose, etc.).      10/1/20 Pt will take all medications prescribed to be evaluated on each outreach through  20       Prepare patients and caregivers for end of life decisions (ie. need for hospice, pain management, symptom relief, advance directives etc.)      10/1/20 Pt will complete an ACP and have it scanned into their EMR by 20       Prevent complications post hospitalization. 1. CTN will monitor X 4 weeks    2. Ensure provider appt is scheduled within 7 days post-discharge 12/21/2020 Patient stated that she did not call to schedule appt. Will place her on list to. 12/28/2020 Patient has appt with PCP 12/21/2020    3. Confirm patient attended post-discharge provider apt     4. Complete post-visit call to confirm attendance and update care needs  12/21/2020 Patient stated that she was doing well at time of call. No care needs noted. 12/28/2020 Patient stated that she is doing well. No care needs noted. 5. Review/educate common or potential \"red flags\" of condition worsening 12/21/2020 Discussed signs and symptoms of CHF such as weight gain 3# in one day or 5# in a week, swelling in abdomen or legs, SOB, fatigue, confusion or memory issues to name a few. Patient stated that her daughter keeps a close eye on her. 6. Evaluate adherence to medications and priority barriers to resolve   12/21/2020 Patient stated that she has all medications and she takes them as directed. 12/28/2020 Patient has all meds and is taking as prescribed. 7. Instruct on adherence to medications as ordered and assess for therapeutic response and side-effects    12/21/2020 Patient expressed that she knows why she takes medications and is aware of when she needs to call physician. She stated daughter watches her and will call as necessary. 12/28/2020 Patient has no concerns    8. Discuss and evaluate ADL performance. Provide recommendations on energy conservation, particularly related to transition home from an inpatient admission. 12/21/2020 Patient stated that she uses a cane for mobilization. Patient stated that she moves about some and then she rests. 12/28/2020 Patient stated that she is using a walker or cane to move about. She stated that she is getting up and moving about. Encouraged her to also do leg raises and arm circles.

## 2020-12-29 ENCOUNTER — TELEPHONE (OUTPATIENT)
Dept: FAMILY MEDICINE CLINIC | Age: 85
End: 2020-12-29

## 2020-12-29 DIAGNOSIS — L89.209 PRESSURE INJURY OF SKIN OF HIP, UNSPECIFIED INJURY STAGE, UNSPECIFIED LATERALITY: Primary | ICD-10-CM

## 2020-12-29 NOTE — TELEPHONE ENCOUNTER
Patients daughter called to state that they need to set up home healthcare for sores on her bottom. They do not know what steps to take and would like to see if Lenore Gil can give any guidance.

## 2020-12-30 ENCOUNTER — HOME HEALTH ADMISSION (OUTPATIENT)
Dept: HOME HEALTH SERVICES | Facility: HOME HEALTH | Age: 85
End: 2020-12-30

## 2020-12-30 NOTE — TELEPHONE ENCOUNTER
Referral faxed to [facility]; pt's daughter notified.

## 2020-12-31 ENCOUNTER — VIRTUAL VISIT (OUTPATIENT)
Dept: FAMILY MEDICINE CLINIC | Age: 85
End: 2020-12-31
Payer: MEDICARE

## 2020-12-31 DIAGNOSIS — N18.30 STAGE 3 CHRONIC KIDNEY DISEASE, UNSPECIFIED WHETHER STAGE 3A OR 3B CKD (HCC): ICD-10-CM

## 2020-12-31 DIAGNOSIS — Z99.81 DEPENDENCE ON SUPPLEMENTAL OXYGEN: ICD-10-CM

## 2020-12-31 DIAGNOSIS — I50.22 CHRONIC SYSTOLIC CONGESTIVE HEART FAILURE (HCC): Primary | ICD-10-CM

## 2020-12-31 DIAGNOSIS — I48.0 PAROXYSMAL ATRIAL FIBRILLATION (HCC): ICD-10-CM

## 2020-12-31 DIAGNOSIS — R29.898 WEAKNESS OF BOTH LOWER EXTREMITIES: ICD-10-CM

## 2020-12-31 PROCEDURE — 1101F PT FALLS ASSESS-DOCD LE1/YR: CPT | Performed by: FAMILY MEDICINE

## 2020-12-31 PROCEDURE — 1090F PRES/ABSN URINE INCON ASSESS: CPT | Performed by: FAMILY MEDICINE

## 2020-12-31 PROCEDURE — G8510 SCR DEP NEG, NO PLAN REQD: HCPCS | Performed by: FAMILY MEDICINE

## 2020-12-31 PROCEDURE — G8536 NO DOC ELDER MAL SCRN: HCPCS | Performed by: FAMILY MEDICINE

## 2020-12-31 PROCEDURE — G8420 CALC BMI NORM PARAMETERS: HCPCS | Performed by: FAMILY MEDICINE

## 2020-12-31 PROCEDURE — G8427 DOCREV CUR MEDS BY ELIG CLIN: HCPCS | Performed by: FAMILY MEDICINE

## 2020-12-31 PROCEDURE — 99213 OFFICE O/P EST LOW 20 MIN: CPT | Performed by: FAMILY MEDICINE

## 2020-12-31 RX ORDER — QUETIAPINE FUMARATE 25 MG/1
1 TABLET, FILM COATED ORAL
COMMUNITY
Start: 2020-12-20 | End: 2021-01-08

## 2020-12-31 RX ORDER — ROPINIROLE 0.5 MG/1
3 TABLET, FILM COATED ORAL
COMMUNITY
Start: 2020-12-08

## 2020-12-31 RX ORDER — SACUBITRIL AND VALSARTAN 49; 51 MG/1; MG/1
1 TABLET, FILM COATED ORAL 2 TIMES DAILY
COMMUNITY
Start: 2020-10-15

## 2020-12-31 RX ORDER — WARFARIN 6 MG/1
6-9 TABLET ORAL DAILY
COMMUNITY
Start: 2020-12-20

## 2020-12-31 RX ORDER — BUMETANIDE 2 MG/1
TABLET ORAL
COMMUNITY
Start: 2020-12-03

## 2020-12-31 NOTE — PROGRESS NOTES
Arlinda Apgar is a 80 y.o. female who was seen by synchronous (real-time) audio-video technology using doxy. me on 2020. Mr#: [id number]    Consent:  She and/or her healthcare decision maker is aware that this patient-initiated Telehealth encounter is a billable service, with coverage as determined by her insurance carrier. She is aware that she may receive a bill and has provided verbal consent to proceed: YES    I was in the office while conducting this encounter. 712  HPI/Subjective:     She presents for follow-up after 2 recent hospital admissions first with congestive heart failure then with nighttime hallucinations subsequently felt to represent \"sundowning\" and improved after she was advised to avoid sleeping during the day and attempt to sleep at night. She was also given Seroquel. She is currently on nasal oxygen and reports that she is able to lie supine without being short of breath, and is able to ambulate short distances. She does report lower extremity edema. She has apparently developed a pressure ulcer in the sacral area which was described by home health as stage II. The patient reports that she feels this is healing nicely and there is no drainage. Her daughter is requesting a wheelchair since it is difficult to transport the patient and her oxygen to her outpatient appointments.           Patient Active Problem List   Diagnosis Code    History of heart valve replacement Z95.2    Essential hypertension, benign I10    Other hyperlipidemia E78.49    Atrial fibrillation ([address] I48.91    Osteoarthritis of knee M17.10    Osteoporosis M81.0    History of total knee replacement Z96.659    Acute congestive heart failure (HCC) I50.9    Cardiomyopathy ([address] I42.9    Prediabetes R73.03    CKD (chronic kidney disease) stage 3, GFR 30-59 ml/min N18.30    Gastroesophageal reflux disease K21.9    UGIB (upper gastrointestinal bleed) K92.2    Insomnia G47.00    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 , Disp: , Rfl:     bumetanide (BUMEX) 2 mg tablet, take 1 tablet by mouth once daily as directed, Disp: , Rfl:     rOPINIRole (REQUIP) 0.5 mg tablet, Take 3 Tabs by mouth nightly., Disp: , Rfl:     Entresto 49-51 mg tab tablet, Take 1 Tab by mouth two (2) times a day., Disp: , Rfl:     warfarin (COUMADIN) 6 mg tablet, Take 6-9 mg by mouth daily. Take 1-1.5 Tabs by Mouth Once a Day. Take 1 x 6mg tablet (6mg) by mouth in the evening on Saturday, Sunday, Tuesday, Thursday and take 1.5 x 6mg tablet (9mg) all other days (Monday, Wednesday, Friday). Followed by [facility]. , Disp: , Rfl:     esomeprazole (NEXIUM) 40 mg capsule, take 1 capsule by mouth once daily, Disp: 90 Cap, Rfl: 1    carvediloL (COREG) 12.5 mg tablet, take 1 tablet by mouth twice a day with food, Disp: 60 Tab, Rfl: 5    aspirin delayed-release 81 mg tablet, Take  by mouth daily. , Disp: , Rfl:     atorvastatin (LIPITOR) 20 mg tablet, take 1 tablet by mouth once daily, Disp: 90 Tab, Rfl: 3    anastrozole (ARIMIDEX) 1 mg tablet, Take 1 mg by mouth daily. , Disp: , Rfl:     isosorbide mononitrate ER (IMDUR) 30 mg tablet, Take 30 mg by mouth daily. , Disp: , Rfl:     multivitamins-minerals-lutein (CENTRUM SILVER) Tab, Take 1 Tab by mouth daily. , Disp: , Rfl:     calcium carbonate (CALTREX) 600 mg (1,500 mg) tablet, Take 600 mg by mouth two (2) times a day.  , Disp: , Rfl:       Review of Systems   Constitutional: Negative for fever. Respiratory: Positive for shortness of breath. Negative for cough and wheezing. Cardiovascular: Positive for leg swelling. Negative for chest pain, palpitations, orthopnea and PND. Gastrointestinal: Negative for abdominal pain, diarrhea, nausea and vomiting.    Skin:        Reported sacral pressure lesion, see HPI   Psychiatric/Behavioral: Negative for depression. PHYSICAL EXAMINATION:    Constitutional: [x] Appears well-developed and well-nourished [x] No apparent distress        Mental status: [x] Alert and awake  [x] Oriented t [x] Able to follow commands      Eyes:   EOM    [x]  Normal        HENT: [x] Normocephalic,      Pulmonary/Chest: [x] Respiratory effort normal   [x] No visualized signs of difficulty breathing or respiratory distress           Musculoskeletal:   [x] No obvious deformities noted with regard to areas viewed           Neurological:        [x] No apparent neurologic deficits noted in areas viewed                 Skin:        [x] No apparent dermatologic abnormalities noted in areas viewed               Psychiatric:       [x] Normal Affect      Other pertinent observable physical exam findings:-None noted          Assessment & Plan:   Diagnoses and all orders for this visit:    1. Chronic systolic congestive heart failure (HCC)    2. Paroxysmal atrial fibrillation (HCC)    3. Stage 3 chronic kidney disease, unspecified whether stage 3a or 3b CKD    4. Dependence on supplemental oxygen    5. Weakness of both lower extremities    Continue current medications  Continued home health monitoring of pressure ulcer  Subspecialty follow-up as scheduled  Elevate the legs as much as possible  Lightweight manual wheelchair requested      I advised her to contact the office if her condition worsens, changes, fails to improve as anticipated and with any new problems. She expressed understanding with the diagnosis(es) and plan.           [facility] the patient is at home and the provider is at [facility] and [address] to the emergency declaration under the [address] [place] [address], [address] [place] [address] authority and the [employer] Act, this Virtual  Visit was conducted, with patient's consent, to reduce the patient's risk of exposure to COVID-19 and provide continuity of care for an established patient. Services were provided through a video synchronous discussion virtually to substitute for in-person clinic visit. Nolan Gonzalez MD         PLEASE NOTE:   This document has been produced using voice recognition software. Unrecognized errors in transcription may be present.

## 2020-12-31 NOTE — PATIENT INSTRUCTIONS
Continue current medications Continued home health monitoring of pressure ulcer Subspecialty follow-up as scheduled Elevate the legs as much as possible Lightweight manual wheelchair requested

## 2021-01-01 ENCOUNTER — HOME CARE VISIT (OUTPATIENT)
Dept: HOME HEALTH SERVICES | Facility: HOME HEALTH | Age: 86
End: 2021-01-01

## 2021-01-04 ENCOUNTER — TELEPHONE (OUTPATIENT)
Dept: FAMILY MEDICINE CLINIC | Age: 86
End: 2021-01-04

## 2021-01-04 ENCOUNTER — PATIENT OUTREACH (OUTPATIENT)
Dept: CASE MANAGEMENT | Age: 86
End: 2021-01-04

## 2021-01-04 NOTE — TELEPHONE ENCOUNTER
Received call from [employer] requesting name and number of pt's cardiologist; [facility]; [phone] phone.

## 2021-01-04 NOTE — PROGRESS NOTES
Transitions of Care Coordination  Follow-Up    Date/Time:  2021 12:18 PM     CTN (Care Transitions Nurse) contacted patient for Transitions of Care Coordination  follow up. Verified 2 patient identifiers (name and ). Spoke to patient. Introduced self/role and reason for call. Patient reported:   Patient is doing well. She has all meds and is taking as directed. She is having HH to assist in increasing strength and stamina    Pertinent negatives: Attempting to get stronger. Specialist appointments since last outreach? yes  If so, specialist and date: PCP 2020-virtually    Medications:   New medications since last outreach: no  Does patient need refills on any medications: no  Medication changes since last outreach (dose adjustments or discontinued meds): no    Home Health company: [facility]  Date of discharge: unknown    Barriers to care?  functional physical ability, medical condition, support system        Patient reminded that there are physicians on call 24 hours a day / 7 days a week (M-F 5pm to 8am and from Friday 5pm until Monday 8a for the weekend) should the patient have questions or concerns. No future appointments. Other upcoming appointments:  n/a    Goals      Attends follow up appointments on schedule      10/1/20 Patient will attend all scheduled appointments through 20       Knowledge and adherence of prescribed medication (ie. action, side effects, missed dose, etc.).      10/1/20 Pt will take all medications prescribed to be evaluated on each outreach through  20       Prepare patients and caregivers for end of life decisions (ie. need for hospice, pain management, symptom relief, advance directives etc.)      10/1/20 Pt will complete an ACP and have it scanned into their EMR by 20       Prevent complications post hospitalization. 1. CTN will monitor X 4 weeks    2.  Ensure provider appt is scheduled within 7 days post-discharge 2020 Patient stated that she did not call to schedule appt. Will place her on list to. 12/28/2020 Patient has appt with PCP 12/31/2020    3. Confirm patient attended post-discharge provider apt 1/4/2021 Patient saw PCP virtually 12/31/2020    4. Complete post-visit call to confirm attendance and update care needs  12/21/2020 Patient stated that she was doing well at time of call. No care needs noted. 12/28/2020 Patient stated that she is doing well. No care needs noted. 1/4/2021 No care needs at present time.     5. Review/educate common or potential \"red flags\" of condition worsening 12/21/2020 Discussed signs and symptoms of CHF such as weight gain 3# in one day or 5# in a week, swelling in abdomen or legs, SOB, fatigue, confusion or memory issues to name a few. Patient stated that her daughter keeps a close eye on her.     6. Evaluate adherence to medications and priority barriers to resolve   12/21/2020 Patient stated that she has all medications and she takes them as directed. 12/28/2020 Patient has all meds and is taking as prescribed. 1/4/2021 Patient has all meds and is taking as directed.      7. Instruct on adherence to medications as ordered and assess for therapeutic response and side-effects    12/21/2020 Patient expressed that she knows why she takes medications and is aware of when she needs to call physician. She stated daughter watches her and will call as necessary. 12/28/2020 Patient has no concerns 1/4/2021 No medication concerns at present.     8. Discuss and evaluate ADL performance.  Provide recommendations on energy conservation, particularly related to transition home from an inpatient admission. 12/21/2020 Patient stated that she uses a cane for mobilization. Patient stated that she moves about some and then she rests. 12/28/2020 Patient stated that she is using a walker or cane to move about. She stated that she is getting up and moving about. Encouraged her to also do leg raises and arm  circles. 1/4/2021 Patient is moving about. Having [facility] to work on increasing strength and stamina.

## 2021-01-08 ENCOUNTER — TELEPHONE (OUTPATIENT)
Dept: FAMILY MEDICINE CLINIC | Age: 86
End: 2021-01-08

## 2021-01-08 RX ORDER — QUETIAPINE FUMARATE 25 MG/1
TABLET, FILM COATED ORAL
Qty: 30 TAB | Refills: 5 | Status: CANCELLED | OUTPATIENT
Start: 2021-01-08

## 2021-01-08 NOTE — TELEPHONE ENCOUNTER
Informed pt's daughter, Reynold Zeng has been refilled and requip 1 mg can be taken up to 3 times daily for restless leg sx. Yes

## 2021-01-08 NOTE — TELEPHONE ENCOUNTER
Pt's daughter called wanting to speak to someone asap because her mom had a black stool this morning and they are very concerned about it. She also has some swelling. Dr Susan Dominguez does not have anything available today. Please advise.

## 2021-01-08 NOTE — TELEPHONE ENCOUNTER
Spoke w/pt's daughter and they realized pt had iron infusion which caused her stool to turn black. They are requesting a refill of the following medication:    Seroquel 25 mg (was given in the hospital) with instructions to take half pill and increase to whole tab if tolerated, pt has been taking whole tab;    Pt is also asking for clarification of dosage of requip; was it changed from 0.5 mg up to 3 times daily to 1 mg up to 3 times daily?

## 2021-01-08 NOTE — TELEPHONE ENCOUNTER
Seroquel has already been refilled she can take the 1.0 mg 3 times daily dose of Requip if she needs that much to control her restless leg symptoms

## 2021-01-08 NOTE — TELEPHONE ENCOUNTER
If she is having tarry black looking stools it could represent bleeding internally and she should be taken to the emergency department

## 2021-01-11 ENCOUNTER — PATIENT OUTREACH (OUTPATIENT)
Dept: CASE MANAGEMENT | Age: 86
End: 2021-01-11

## 2021-01-11 NOTE — PROGRESS NOTES
Transitions of Care Coordination  Follow-Up    Date/Time:  2021 4:02 PM     CTN (Care Transitions Nurse) contacted patient for Transitions of Care Coordination  follow up. Verified 2 patient identifiers (name and ). Spoke to patient. Introduced self/role and reason for call. Patient reported:   Patient stated that she is doing ok. She has all meds and is taking as directed. She is having [facility] to increase strength and stamina. Pertinent negatives: Working on increasing her strength    Specialist appointments since last outreach? no  If so, specialist and date: n/a    Medications:   New medications since last outreach: no  Does patient need refills on any medications: no  Medication changes since last outreach (dose adjustments or discontinued meds): no    Home Health company: [facility]  Date of discharge: unknown    Barriers to care?  functional physical ability, medical condition, support system        Patient reminded that there are physicians on call 24 hours a day / 7 days a week (M-F 5pm to 8am and from Friday 5pm until Monday 8a for the weekend) should the patient have questions or concerns. No future appointments. Other upcoming appointments:  n/a    Goals      Attends follow up appointments on schedule      10/1/20 Patient will attend all scheduled appointments through 20       Knowledge and adherence of prescribed medication (ie. action, side effects, missed dose, etc.).      10/1/20 Pt will take all medications prescribed to be evaluated on each outreach through  20       Prepare patients and caregivers for end of life decisions (ie. need for hospice, pain management, symptom relief, advance directives etc.)      10/1/20 Pt will complete an ACP and have it scanned into their EMR by 20       Prevent complications post hospitalization. 1. CTN will monitor X 4 weeks    2.  Ensure provider appt is scheduled within 7 days post-discharge 2020 Patient stated that she did not call to schedule appt. Will place her on list to. 12/28/2020 Patient has appt with PCP 12/31/2020    3. Confirm patient attended post-discharge provider apt 1/4/2021 Patient saw PCP virtually 12/31/2020    4. Complete post-visit call to confirm attendance and update care needs  12/21/2020 Patient stated that she was doing well at time of call. No care needs noted. 12/28/2020 Patient stated that she is doing well. No care needs noted. 1/4/2021 No care needs at present time. 1/11/2021 Patient doing ok at present. No care needs noted. 5. Review/educate common or potential \"red flags\" of condition worsening 12/21/2020 Discussed signs and symptoms of CHF such as weight gain 3# in one day or 5# in a week, swelling in abdomen or legs, SOB, fatigue, confusion or memory issues to name a few. Patient stated that her daughter keeps a close eye on her. 6. Evaluate adherence to medications and priority barriers to resolve   12/21/2020 Patient stated that she has all medications and she takes them as directed. 12/28/2020 Patient has all meds and is taking as prescribed. 1/4/2021 Patient has all meds and is taking as directed. 1/11/2021 Patient has all meds and is taking per physician orders. 7. Instruct on adherence to medications as ordered and assess for therapeutic response and side-effects    12/21/2020 Patient expressed that she knows why she takes medications and is aware of when she needs to call physician. She stated daughter watches her and will call as necessary. 12/28/2020 Patient has no concerns 1/4/2021 No medication concerns at present. 1/11/2021 No concerns about medications at present. 8. Discuss and evaluate ADL performance. Provide recommendations on energy conservation, particularly related to transition home from an inpatient admission. 12/21/2020 Patient stated that she uses a cane for mobilization. Patient stated that she moves about some and then she rests. 12/28/2020 Patient stated that she is using a walker or cane to move about. She stated that she is getting up and moving about. Encouraged her to also do leg raises and arm circles. 1/4/2021 Patient is moving about. Having [facility] to work on increasing strength and stamina. 1/11/2021 Patient continues to have [address] [facility] to increase strength and stamina.

## 2021-01-20 ENCOUNTER — PATIENT OUTREACH (OUTPATIENT)
Dept: CASE MANAGEMENT | Age: 86
End: 2021-01-20

## 2021-01-20 ENCOUNTER — TELEPHONE (OUTPATIENT)
Dept: FAMILY MEDICINE CLINIC | Age: 86
End: 2021-01-20

## 2021-01-20 NOTE — TELEPHONE ENCOUNTER
[facility] called wanting to let Dr Cowan Parents know that there was a delay in care to the patient due to staffing.  Just liyah Hou

## 2021-01-20 NOTE — PROGRESS NOTES
Patient has graduated from the Transitions of Care Coordination  program on 1/20/2021. Patient's symptoms are stable at this time. Patient/family has the ability to self-manage. Care management goals have been completed at this time. No further care transitions nurse follow up scheduled. Goals Addressed                 This Visit's Progress     Prevent complications post hospitalization. 1. CTN will monitor X 4 weeks    2. Ensure provider appt is scheduled within 7 days post-discharge 12/21/2020 Patient stated that she did not call to schedule appt. Will place her on list to. 12/28/2020 Patient has appt with PCP 12/31/2020    3. Confirm patient attended post-discharge provider apt 1/4/2021 Patient saw PCP virtually 12/31/2020    4. Complete post-visit call to confirm attendance and update care needs  12/21/2020 Patient stated that she was doing well at time of call. No care needs noted. 12/28/2020 Patient stated that she is doing well. No care needs noted. 1/4/2021 No care needs at present time. 1/11/2021 Patient doing ok at present. No care needs noted. 1/20/2021 No complaints at present at an appt. 5. Review/educate common or potential \"red flags\" of condition worsening 12/21/2020 Discussed signs and symptoms of CHF such as weight gain 3# in one day or 5# in a week, swelling in abdomen or legs, SOB, fatigue, confusion or memory issues to name a few. Patient stated that her daughter keeps a close eye on her. 6. Evaluate adherence to medications and priority barriers to resolve   12/21/2020 Patient stated that she has all medications and she takes them as directed. 12/28/2020 Patient has all meds and is taking as prescribed. 1/4/2021 Patient has all meds and is taking as directed. 1/11/2021 Patient has all meds and is taking per physician orders. 1/20/2021 Patient has all meds and is taking as directed.      7. Instruct on adherence to medications as ordered and assess for therapeutic response and side-effects    12/21/2020 Patient expressed that she knows why she takes medications and is aware of when she needs to call physician. She stated daughter watches her and will call as necessary. 12/28/2020 Patient has no concerns 1/4/2021 No medication concerns at present. 1/11/2021 No concerns about medications at present. 1/20/2021 No medication concerns today. 8. Discuss and evaluate ADL performance. Provide recommendations on energy conservation, particularly related to transition home from an inpatient admission. 12/21/2020 Patient stated that she uses a cane for mobilization. Patient stated that she moves about some and then she rests. 12/28/2020 Patient stated that she is using a walker or cane to move about. She stated that she is getting up and moving about. Encouraged her to also do leg raises and arm circles. 1/4/2021 Patient is moving about. Having [facility] to work on increasing strength and stamina. 1/11/2021 Patient continues to have [address] [facility] to increase strength and stamina. 1/20/2021 Patient has been discharged from [facility] and still having swelling in legs per daughter. Patient has care transitions nurse contact information for any further questions, concerns, or needs. Patient's upcoming visits:  No future appointments.

## 2021-01-29 ENCOUNTER — PATIENT OUTREACH (OUTPATIENT)
Dept: CASE MANAGEMENT | Age: 86
End: 2021-01-29

## 2022-09-26 NOTE — PROGRESS NOTES
! L NS given no complaints voiced   Chief Complaint   Patient presents with    Irregular Heart Beat    Coagulation disorder    Medication Refill     Pain scale 0/10      1. Have you been to the ER, urgent care clinic since your last visit? Hospitalized since your last visit? No    2. Have you seen or consulted any other health care providers outside of the [address] [place] [address] since your last visit? Include any pap smears or colon screening.  No

## 2024-07-29 NOTE — PROGRESS NOTES
"Chief Complaint   Patient presents with    Shortness of Breath       Pt to ED complaining of SOB and bilateral lower extremity edema. Hx of CHF. Pt says she is compliant with medications      73 year female with a history of CHF, COPD, diabetes, hypertension, on home oxygen presents the ER with gradually worsening shortness of breath since earlier today, history of this multiple times in the past.  Patient states "I have too much fluid".  Denies chest pain.  No nausea vomiting diarrhea.  Not ill appearing, no fever.  No sinus congestion.     Updated HPI: Above history confirm patient presents with significant volume overload.  Endorses worsening shortness of breath for the last couple of days.  Multiple admissions in the past with similar complaints.  " Mikael Nuñez is a 80 y.o. female who presents today for Anticoagulation monitoring. Indication: Atrial Fibrillation  INR Goal: 2.0-3.0. Current dose:  Coumadin 6 mg Tue-Thur and 7 mg Fri-Mon.  Missed Coumadin Doses:  None  Medication Changes:  no  Dietary Changes:  no    Symptoms: taking coumadin appropriately without any bleeding. Latest INRs:  Lab Results   Component Value Date/Time    INR 1.7 (H) 09/30/2014 09:47 AM    INR 3.1 (H) 01/03/2014 12:00 AM    INR POC 2.5 11/28/2018 11:09 AM    INR POC 2.4 10/25/2018 11:36 AM    INR POC 2.5 09/25/2018 10:52 AM    Prothrombin time 17.6 (H) 09/30/2014 09:47 AM    Prothrombin time 32.8 (H) 01/03/2014 12:00 AM        New Coumadin dose:.current treatment plan is effective, no change in therapy. Next check to be scheduled for  1 month.